# Patient Record
Sex: MALE | Race: WHITE | Employment: UNEMPLOYED | URBAN - METROPOLITAN AREA
[De-identification: names, ages, dates, MRNs, and addresses within clinical notes are randomized per-mention and may not be internally consistent; named-entity substitution may affect disease eponyms.]

---

## 2022-01-28 ENCOUNTER — APPOINTMENT (OUTPATIENT)
Dept: CT IMAGING | Age: 77
DRG: 177 | End: 2022-01-28
Payer: MEDICARE

## 2022-01-28 ENCOUNTER — APPOINTMENT (OUTPATIENT)
Dept: NUCLEAR MEDICINE | Age: 77
DRG: 177 | End: 2022-01-28
Payer: MEDICARE

## 2022-01-28 ENCOUNTER — APPOINTMENT (OUTPATIENT)
Dept: GENERAL RADIOLOGY | Age: 77
DRG: 177 | End: 2022-01-28
Payer: MEDICARE

## 2022-01-28 ENCOUNTER — HOSPITAL ENCOUNTER (INPATIENT)
Age: 77
LOS: 21 days | Discharge: NURSING, REHAB OR LONG-TERM CARE | DRG: 177 | End: 2022-02-18
Attending: EMERGENCY MEDICINE | Admitting: FAMILY MEDICINE
Payer: MEDICARE

## 2022-01-28 DIAGNOSIS — R09.02 HYPOXIA: ICD-10-CM

## 2022-01-28 DIAGNOSIS — R41.82 ALTERED MENTAL STATUS, UNSPECIFIED ALTERED MENTAL STATUS TYPE: ICD-10-CM

## 2022-01-28 DIAGNOSIS — R79.89 ELEVATED SERUM CREATININE: ICD-10-CM

## 2022-01-28 DIAGNOSIS — E87.0 HYPERNATREMIA: ICD-10-CM

## 2022-01-28 DIAGNOSIS — R74.8 ELEVATED LIPASE: ICD-10-CM

## 2022-01-28 DIAGNOSIS — U07.1 COVID: Primary | ICD-10-CM

## 2022-01-28 PROBLEM — N17.9 ACUTE KIDNEY INJURY (HCC): Status: ACTIVE | Noted: 2022-01-28

## 2022-01-28 LAB
ALBUMIN SERPL-MCNC: 3.3 GM/DL (ref 3.4–5)
ALP BLD-CCNC: 72 IU/L (ref 40–129)
ALT SERPL-CCNC: 20 U/L (ref 10–40)
ANION GAP SERPL CALCULATED.3IONS-SCNC: 18 MMOL/L (ref 4–16)
AST SERPL-CCNC: 32 IU/L (ref 15–37)
BASE EXCESS MIXED: 1 (ref 0–1.2)
BASOPHILS ABSOLUTE: 0 K/CU MM
BASOPHILS RELATIVE PERCENT: 0 % (ref 0–1)
BILIRUB SERPL-MCNC: 0.7 MG/DL (ref 0–1)
BUN BLDV-MCNC: 97 MG/DL (ref 6–23)
CALCIUM SERPL-MCNC: 9.2 MG/DL (ref 8.3–10.6)
CHLORIDE BLD-SCNC: 112 MMOL/L (ref 99–110)
CO2: 21 MMOL/L (ref 21–32)
COMMENT: ABNORMAL
CREAT SERPL-MCNC: 3.1 MG/DL (ref 0.9–1.3)
D DIMER: 515 NG/ML(DDU)
DIFFERENTIAL TYPE: ABNORMAL
EKG ATRIAL RATE: 97 BPM
EKG DIAGNOSIS: NORMAL
EKG P AXIS: 20 DEGREES
EKG P-R INTERVAL: 130 MS
EKG Q-T INTERVAL: 372 MS
EKG QRS DURATION: 84 MS
EKG QTC CALCULATION (BAZETT): 472 MS
EKG R AXIS: -10 DEGREES
EKG T AXIS: 23 DEGREES
EKG VENTRICULAR RATE: 97 BPM
EOSINOPHILS ABSOLUTE: 0 K/CU MM
EOSINOPHILS RELATIVE PERCENT: 0 % (ref 0–3)
FERRITIN: 2498 NG/ML (ref 30–400)
GFR AFRICAN AMERICAN: 24 ML/MIN/1.73M2
GFR NON-AFRICAN AMERICAN: 20 ML/MIN/1.73M2
GLUCOSE BLD-MCNC: 199 MG/DL (ref 70–99)
GLUCOSE BLD-MCNC: 213 MG/DL (ref 70–99)
HCO3 VENOUS: 26 MMOL/L (ref 19–25)
HCT VFR BLD CALC: 44.1 % (ref 42–52)
HEMOGLOBIN: 14.2 GM/DL (ref 13.5–18)
HIGH SENSITIVE C-REACTIVE PROTEIN: 104.6 MG/L
IMMATURE NEUTROPHIL %: 0.9 % (ref 0–0.43)
LACTATE: 1.8 MMOL/L (ref 0.4–2)
LIPASE: 72 IU/L (ref 13–60)
LYMPHOCYTES ABSOLUTE: 0.6 K/CU MM
LYMPHOCYTES RELATIVE PERCENT: 11.7 % (ref 24–44)
MCH RBC QN AUTO: 30.1 PG (ref 27–31)
MCHC RBC AUTO-ENTMCNC: 32.2 % (ref 32–36)
MCV RBC AUTO: 93.4 FL (ref 78–100)
MONOCYTES ABSOLUTE: 0.2 K/CU MM
MONOCYTES RELATIVE PERCENT: 4.4 % (ref 0–4)
NUCLEATED RBC %: 0 %
O2 SAT, VEN: 51 % (ref 50–70)
PCO2, VEN: 42 MMHG (ref 38–52)
PDW BLD-RTO: 14.6 % (ref 11.7–14.9)
PH VENOUS: 7.4 (ref 7.32–7.42)
PLATELET # BLD: 157 K/CU MM (ref 140–440)
PMV BLD AUTO: 10.1 FL (ref 7.5–11.1)
PO2, VEN: 29 MMHG (ref 28–48)
POTASSIUM SERPL-SCNC: 3.5 MMOL/L (ref 3.5–5.1)
PRO-BNP: 614.7 PG/ML
PROCALCITONIN: 0.41
RBC # BLD: 4.72 M/CU MM (ref 4.6–6.2)
SARS-COV-2, NAAT: DETECTED
SEGMENTED NEUTROPHILS ABSOLUTE COUNT: 4.5 K/CU MM
SEGMENTED NEUTROPHILS RELATIVE PERCENT: 83 % (ref 36–66)
SODIUM BLD-SCNC: 151 MMOL/L (ref 135–145)
SOURCE: ABNORMAL
TOTAL CK: 157 IU/L (ref 38–174)
TOTAL IMMATURE NEUTOROPHIL: 0.05 K/CU MM
TOTAL NUCLEATED RBC: 0 K/CU MM
TOTAL PROTEIN: 6.9 GM/DL (ref 6.4–8.2)
TROPONIN T: <0.01 NG/ML
WBC # BLD: 5.4 K/CU MM (ref 4–10.5)

## 2022-01-28 PROCEDURE — 78580 LUNG PERFUSION IMAGING: CPT

## 2022-01-28 PROCEDURE — 87635 SARS-COV-2 COVID-19 AMP PRB: CPT

## 2022-01-28 PROCEDURE — 85379 FIBRIN DEGRADATION QUANT: CPT

## 2022-01-28 PROCEDURE — 85025 COMPLETE CBC W/AUTO DIFF WBC: CPT

## 2022-01-28 PROCEDURE — 3430000000 HC RX DIAGNOSTIC RADIOPHARMACEUTICAL: Performed by: FAMILY MEDICINE

## 2022-01-28 PROCEDURE — 71045 X-RAY EXAM CHEST 1 VIEW: CPT

## 2022-01-28 PROCEDURE — 83605 ASSAY OF LACTIC ACID: CPT

## 2022-01-28 PROCEDURE — A9540 TC99M MAA: HCPCS | Performed by: FAMILY MEDICINE

## 2022-01-28 PROCEDURE — 96374 THER/PROPH/DIAG INJ IV PUSH: CPT

## 2022-01-28 PROCEDURE — 82805 BLOOD GASES W/O2 SATURATION: CPT

## 2022-01-28 PROCEDURE — 82550 ASSAY OF CK (CPK): CPT

## 2022-01-28 PROCEDURE — 99283 EMERGENCY DEPT VISIT LOW MDM: CPT

## 2022-01-28 PROCEDURE — 82962 GLUCOSE BLOOD TEST: CPT

## 2022-01-28 PROCEDURE — 6360000002 HC RX W HCPCS: Performed by: PHYSICIAN ASSISTANT

## 2022-01-28 PROCEDURE — 82140 ASSAY OF AMMONIA: CPT

## 2022-01-28 PROCEDURE — 87040 BLOOD CULTURE FOR BACTERIA: CPT

## 2022-01-28 PROCEDURE — 70450 CT HEAD/BRAIN W/O DYE: CPT

## 2022-01-28 PROCEDURE — 96360 HYDRATION IV INFUSION INIT: CPT

## 2022-01-28 PROCEDURE — 86141 C-REACTIVE PROTEIN HS: CPT

## 2022-01-28 PROCEDURE — 93005 ELECTROCARDIOGRAM TRACING: CPT | Performed by: PHYSICIAN ASSISTANT

## 2022-01-28 PROCEDURE — 2580000003 HC RX 258: Performed by: PHYSICIAN ASSISTANT

## 2022-01-28 PROCEDURE — 80053 COMPREHEN METABOLIC PANEL: CPT

## 2022-01-28 PROCEDURE — 84145 PROCALCITONIN (PCT): CPT

## 2022-01-28 PROCEDURE — 93010 ELECTROCARDIOGRAM REPORT: CPT | Performed by: INTERNAL MEDICINE

## 2022-01-28 PROCEDURE — 1200000000 HC SEMI PRIVATE

## 2022-01-28 PROCEDURE — 83690 ASSAY OF LIPASE: CPT

## 2022-01-28 PROCEDURE — 83880 ASSAY OF NATRIURETIC PEPTIDE: CPT

## 2022-01-28 PROCEDURE — 82728 ASSAY OF FERRITIN: CPT

## 2022-01-28 PROCEDURE — 84484 ASSAY OF TROPONIN QUANT: CPT

## 2022-01-28 PROCEDURE — 2580000003 HC RX 258: Performed by: INTERNAL MEDICINE

## 2022-01-28 RX ORDER — CARBAMAZEPINE 100 MG/1
100 TABLET, EXTENDED RELEASE ORAL 2 TIMES DAILY
COMMUNITY
Start: 2022-01-12

## 2022-01-28 RX ORDER — POLYETHYLENE GLYCOL 3350 17 G/17G
17 POWDER, FOR SOLUTION ORAL DAILY PRN
Status: DISCONTINUED | OUTPATIENT
Start: 2022-01-28 | End: 2022-02-19 | Stop reason: HOSPADM

## 2022-01-28 RX ORDER — LEVOFLOXACIN 5 MG/ML
250 INJECTION, SOLUTION INTRAVENOUS
Status: DISCONTINUED | OUTPATIENT
Start: 2022-01-30 | End: 2022-01-28

## 2022-01-28 RX ORDER — ONDANSETRON 2 MG/ML
4 INJECTION INTRAMUSCULAR; INTRAVENOUS EVERY 6 HOURS PRN
Status: DISCONTINUED | OUTPATIENT
Start: 2022-01-28 | End: 2022-02-19 | Stop reason: HOSPADM

## 2022-01-28 RX ORDER — FAMOTIDINE 20 MG/1
10 TABLET, FILM COATED ORAL
Status: DISCONTINUED | OUTPATIENT
Start: 2022-01-29 | End: 2022-02-19 | Stop reason: HOSPADM

## 2022-01-28 RX ORDER — ONDANSETRON 4 MG/1
4 TABLET, ORALLY DISINTEGRATING ORAL EVERY 8 HOURS PRN
Status: DISCONTINUED | OUTPATIENT
Start: 2022-01-28 | End: 2022-02-19 | Stop reason: HOSPADM

## 2022-01-28 RX ORDER — LEVOFLOXACIN 5 MG/ML
500 INJECTION, SOLUTION INTRAVENOUS ONCE
Status: DISCONTINUED | OUTPATIENT
Start: 2022-01-28 | End: 2022-01-28

## 2022-01-28 RX ORDER — ACETAMINOPHEN 650 MG/1
650 SUPPOSITORY RECTAL EVERY 6 HOURS PRN
Status: DISCONTINUED | OUTPATIENT
Start: 2022-01-28 | End: 2022-02-19 | Stop reason: HOSPADM

## 2022-01-28 RX ORDER — SODIUM CHLORIDE 0.9 % (FLUSH) 0.9 %
5-40 SYRINGE (ML) INJECTION EVERY 12 HOURS SCHEDULED
Status: DISCONTINUED | OUTPATIENT
Start: 2022-01-28 | End: 2022-02-19 | Stop reason: HOSPADM

## 2022-01-28 RX ORDER — DEXAMETHASONE SODIUM PHOSPHATE 10 MG/ML
6 INJECTION, SOLUTION INTRAMUSCULAR; INTRAVENOUS ONCE
Status: COMPLETED | OUTPATIENT
Start: 2022-01-28 | End: 2022-01-28

## 2022-01-28 RX ORDER — SODIUM CHLORIDE 450 MG/100ML
INJECTION, SOLUTION INTRAVENOUS ONCE
Status: COMPLETED | OUTPATIENT
Start: 2022-01-28 | End: 2022-01-28

## 2022-01-28 RX ORDER — VITAMIN B COMPLEX
2000 TABLET ORAL DAILY
Status: DISCONTINUED | OUTPATIENT
Start: 2022-01-28 | End: 2022-02-19 | Stop reason: HOSPADM

## 2022-01-28 RX ORDER — ACETAMINOPHEN 325 MG/1
650 TABLET ORAL EVERY 6 HOURS PRN
Status: DISCONTINUED | OUTPATIENT
Start: 2022-01-28 | End: 2022-02-19 | Stop reason: HOSPADM

## 2022-01-28 RX ORDER — DEXAMETHASONE SODIUM PHOSPHATE 10 MG/ML
6 INJECTION, SOLUTION INTRAMUSCULAR; INTRAVENOUS EVERY 24 HOURS
Status: DISCONTINUED | OUTPATIENT
Start: 2022-01-29 | End: 2022-02-01

## 2022-01-28 RX ORDER — SODIUM CHLORIDE 450 MG/100ML
INJECTION, SOLUTION INTRAVENOUS CONTINUOUS
Status: DISCONTINUED | OUTPATIENT
Start: 2022-01-28 | End: 2022-01-30

## 2022-01-28 RX ORDER — HEPARIN SODIUM 5000 [USP'U]/ML
5000 INJECTION, SOLUTION INTRAVENOUS; SUBCUTANEOUS EVERY 8 HOURS SCHEDULED
Status: DISCONTINUED | OUTPATIENT
Start: 2022-01-28 | End: 2022-02-01

## 2022-01-28 RX ORDER — LEVOFLOXACIN 5 MG/ML
750 INJECTION, SOLUTION INTRAVENOUS
Status: DISCONTINUED | OUTPATIENT
Start: 2022-01-28 | End: 2022-02-01

## 2022-01-28 RX ORDER — ASPIRIN 81 MG/1
81 TABLET, CHEWABLE ORAL DAILY
Status: DISCONTINUED | OUTPATIENT
Start: 2022-01-29 | End: 2022-02-19 | Stop reason: HOSPADM

## 2022-01-28 RX ORDER — SODIUM CHLORIDE 0.9 % (FLUSH) 0.9 %
5-40 SYRINGE (ML) INJECTION PRN
Status: DISCONTINUED | OUTPATIENT
Start: 2022-01-28 | End: 2022-02-19 | Stop reason: HOSPADM

## 2022-01-28 RX ADMIN — DEXAMETHASONE SODIUM PHOSPHATE 6 MG: 10 INJECTION, SOLUTION INTRAMUSCULAR; INTRAVENOUS at 16:34

## 2022-01-28 RX ADMIN — SODIUM CHLORIDE: 4.5 INJECTION, SOLUTION INTRAVENOUS at 16:35

## 2022-01-28 RX ADMIN — Medication 3.1 MILLICURIE: at 21:20

## 2022-01-28 RX ADMIN — SODIUM CHLORIDE: 4.5 INJECTION, SOLUTION INTRAVENOUS at 20:00

## 2022-01-28 ASSESSMENT — PAIN SCALES - GENERAL: PAINLEVEL_OUTOF10: 0

## 2022-01-28 NOTE — ED NOTES
Medication History  [facility]    Patient Name: Roxane Velez III 1945     Medication history has been completed by: Xiomy Pruitt CPhT    Source(s) of information: insurance claims     Primary Care Physician: Cynthia Díaz MD     Pharmacy: [facility]    Allergies as of 01/28/2022    (No Known Allergies)        Prior to Admission medications    Medication Sig Start Date End Date Taking? Authorizing Provider   carBAMazepine (TEGRETOL XR) 100 MG extended release tablet Take 100 mg by mouth 2 times daily 1/12/22   Historical Provider, MD   glipiZIDE (GLUCOTROL) 2.5 MG ER tablet Take 2.5 mg by mouth daily    Historical Provider, MD   atorvastatin (LIPITOR) 80 MG tablet Take 80 mg by mouth daily    Historical Provider, MD   lisinopril (PRINIVIL;ZESTRIL) 10 MG tablet Take 10 mg by mouth daily    Historical Provider, MD   gabapentin (NEURONTIN) 400 MG capsule Take 800 mg by mouth nightly    Historical Provider, MD   metFORMIN (GLUCOPHAGE) 500 MG tablet Take 1,000 mg by mouth daily (with breakfast). Historical Provider, MD   metFORMIN (GLUCOPHAGE) 500 MG tablet Take 500 mg by mouth Daily with supper. Historical Provider, MD   oxyCODONE (OXYCONTIN) 20 MG extended release tablet Take 20 mg by mouth every 4 hours as needed. Historical Provider, MD     Medications added or changed (ex. new medication, dosage change, interval change, formulation change):  Carbamazepine ER (added)    Medications removed from list (include reason, ex. noncompliance, medication cost, therapy complete etc.):   Atorvastatin flagged for review, no history found of patient taking this medication    Comments:  Unable to assess patient. Reached out to wife via phone no answer. Medication list per insurance claims.     To my knowledge the above medication history is accurate as of 1/28/2022 3:15 PM.   Xiomy Pruitt CPhT   1/28/2022 3:15 PM

## 2022-01-28 NOTE — ED PROVIDER NOTES
EKG  Normal sinus rhythm with rate of 97 bpm.  Nonspecific ST and T wave changes. No ST elevation. No previous to compare.      Demian Chase MD  01/28/22 1839

## 2022-01-28 NOTE — ED PROVIDER NOTES
EMERGENCY DEPARTMENT ENCOUNTER        PCP: Ciro Orozco MD    CHIEF COMPLAINT    Chief Complaint   Patient presents with    Fatigue     Patient staffed with supervising physician Dr. Díaz Elias is a 64 y.o. male who presents by EMS for confusion. EMS was called by [facility] for well check. Patient and wife had been sick for the past week. Additional history unable to be obtained due to the fact that the patient has decreased level of consciousness. REVIEW OF SYSTEMS    Unable to obtain due to the fact that the patient has decreased level of consciousness  See HPI and nursing notes for additional information. PAST MEDICAL & SURGICAL HISTORY    Past Medical History:   Diagnosis Date    DM (diabetes mellitus) ([facility] [address]     Dysphagia     Dysphonia     Hyperlipidemia     Hypertension     Kidney failure     Osteoarthritis      Past Surgical History:   Procedure Laterality Date    CERVICAL SPINE SURGERY      3 years ago    ENDOSCOPY, COLON, DIAGNOSTIC  2/19/2016    mild inflammation at the squamocolumnar junction- biopsied, no hiatal hernia, diffuse gastric erythema- biopsies to rule out chronic gastritis or intestinal metaplasia       CURRENT MEDICATIONS    Current Outpatient Rx   Medication Sig Dispense Refill    carBAMazepine (TEGRETOL XR) 100 MG extended release tablet Take 100 mg by mouth 2 times daily      glipiZIDE (GLUCOTROL) 2.5 MG ER tablet Take 2.5 mg by mouth daily      atorvastatin (LIPITOR) 80 MG tablet Take 80 mg by mouth daily      lisinopril (PRINIVIL;ZESTRIL) 10 MG tablet Take 10 mg by mouth daily      gabapentin (NEURONTIN) 400 MG capsule Take 800 mg by mouth nightly      metFORMIN (GLUCOPHAGE) 500 MG tablet Take 1,000 mg by mouth daily (with breakfast).  metFORMIN (GLUCOPHAGE) 500 MG tablet Take 500 mg by mouth Daily with supper.  oxyCODONE (OXYCONTIN) 20 MG extended release tablet Take 20 mg by mouth every 4 hours as needed.           ALLERGIES No Known Allergies    SOCIAL & FAMILY HISTORY    Social History     Socioeconomic History    Marital status:      Spouse name: Not on file    Number of children: Not on file    Years of education: Not on file    Highest education level: Not on file   Occupational History    Not on file   Tobacco Use    Smoking status: Former Smoker     Years: 50.00     Quit date: 2010     Years since quittin.0    Smokeless tobacco: Not on file   Substance and Sexual Activity    Alcohol use: No    Drug use: No    Sexual activity: Not on file   Other Topics Concern    Not on file   Social History Narrative    Not on file     Social Determinants of Health     Financial Resource Strain:     Difficulty of Paying Living Expenses: Not on file   Food Insecurity:     Worried About [address] in the Last Year: Not on file    [address] in the Last Year: Not on file   Transportation Needs:     Lack of Transportation (Medical): Not on file    Lack of Transportation (Non-Medical):  Not on file   Physical Activity:     Days of Exercise per Week: Not on file    Minutes of Exercise per Session: Not on file   Stress:     Feeling of Stress : Not on file   Social Connections:     Frequency of Communication with Friends and Family: Not on file    Frequency of Social Gatherings with Friends and Family: Not on file    Attends Temple Services: Not on file    Active Member of [address] [place] [address] or Organizations: Not on file    Attends Club or Organization Meetings: Not on file    Marital Status: Not on file   Intimate Partner Violence:     Fear of Current or Ex-Partner: Not on file    Emotionally Abused: Not on file    Physically Abused: Not on file    Sexually Abused: Not on file   Housing Stability:     Unable to Pay for Housing in the Last Year: Not on file    Number of [place] in the Last Year: Not on file    Unstable Housing in the Last Year: Not on file     Family History   Problem Relation Age of Onset    Cancer Sister 61        colon cancer    Cancer Father 68        stomach cancer spread to liver       PHYSICAL EXAM    VITAL SIGNS: BP (!) 143/89   Pulse 101   Temp 98.1 °F (36.7 °C) (Oral)   Resp 17   Ht 6' (1.829 m)   Wt 200 lb (90.7 kg)   SpO2 95%   BMI 27.12 kg/m²    Constitutional: Ill-appearing elderly female  Eyes: Pupils equally round and reactive to light, sclera nonicteric  HENT:  Atraumatic, dry mucus membranes  Neck/Lymphatics: supple, no JVD, no swollen nodes  Respiratory: Decreased lung sounds bilaterally  Cardiovascular: Normal rate and rhythm  GI:  Soft, nontender, normal bowel sounds  Musculoskeletal:  No edema, no acute deformities  Integument:  Warm and dry skin, no petechiae   Neurologic:    Appears drowsy, confused. Normal gross motor coordination & motor strength bilateral upper and lower extremities  Sensation intact. EKG      EKG Interpretation  Please see ED physician's note for EKG interpretation      RADIOLOGY/PROCEDURES    CT HEAD WO CONTRAST   Final Result   1. No acute intracranial abnormality. 2. Diffuse parenchymal volume loss with mild-to-moderate chronic white matter   microvascular ischemic changes. 3. Chronic lacunar infarct in the right thalamus. XR CHEST PORTABLE   Final Result   Ill-defined left-greater-than-right bibasilar airspace disease most   consistent with pneumonia. ED COURSE & MEDICAL DECISION MAKING      Patient presents as above. Patient unable to provide history. Patient had received 500 cc IV fluids by EMS. Patient is hypoxic at 88% on room air, does not normally wear oxygen and placed on 2 L and improved in the mid 90s. I did discuss patient case with wife who states that he has had cough and diarrhea for the past week and that he has seemed more confused. Wife states they are not vaccinated for Covid. Rapid Covid is positive. Venous blood gas is unremarkable. IV Decadron is ordered.  D-dimer, procalcitonin, CRP and ferritin added as patient will require admission for Covid hypoxia. Lipase slightly elevated 72. . CMP shows sodium 151, BUN of 97, creatinine of 3.1, slight anion gap of 18, normal bicarb. CBC within normal limits. Lactic acid is 1.8. Troponin negative. Chest x-ray shows ill-defined left greater than right bibasilar airspace disease most consistent with pneumonia. CT head shows no acute intracranial abnormality. I suspect patient's pneumonia is viral in nature with normal lactic acid and white count. Procalcitonin is 0.409. Consult to nephrologist Dr. Kishan Ann who recommends half-normal saline at 100 mL an hour for hypernatremia and acute kidney injury. Consult hospitalist who accepts admission. CRITICAL CARE NOTE:  There was a high probability of clinically significant life-threatening deterioration of the patient's condition requiring my urgent intervention due to confusion, hypoxia, Covid, hypernatremia. IV Decadron, oxygen, IV fluids, nephrology consult was performed to address this. Total critical care time is at least  35 minutes. This includes vital sign monitoring, pulse oximetry monitoring, telemetry monitoring, clinical response to the IV medications, reviewing the nursing notes, consultation time, dictation/documentation time, and interpretation of the lab work. This time excludes time spent performing procedures and separately billable procedures and family discussion time. Clinical  IMPRESSION    1. COVID    2. Hypoxia    3. Altered mental status, unspecified altered mental status type    4. Hypernatremia    5. Elevated serum creatinine    6. Elevated lipase          PLAN:   Admission to the hospital      Comment: Please note this report has been produced using speech recognition software and may contain errors related to that system including errors in grammar, punctuation, and spelling, as well as words and phrases that may be inappropriate.  If there are any questions or concerns please feel free to contact the dictating provider for clarification.       Wilver Jorge PA-C  01/28/22 6115

## 2022-01-28 NOTE — ED PROVIDER NOTES
I independently examined and evaluated Eric Finders III. In brief, presents with confusion. Per report patient and his wife have been ill for the last week. Unable to obtain further history as pt is confused and answers to questions are incongruent. Vitals:    01/28/22 1536   BP: (!) 165/91   Pulse: 101   Resp: 16   Temp: 97.8 °F (36.6 °C)   SpO2: 94%     Focused exam revealed elderly appearing male laying in the bed. Opens eyes and interacts with me but does not answer questions appropriately. Heart slightly tachycardic but regular rhythm. Lungs with coarse breath sounds bilaterally. Patient is requiring supplemental oxygen to maintain oxygen saturations in the 90s. Patient continues to be confused. When I tell him that he is positive for Covid he tells me that is not possible. EKG:  Nothing acute. Please see Dr. Miroslava Vallecillo note for detailed interpretation. ED course/MDM:  I personally saw the patient and performed a substantive portion of the visit including all aspects of the medical decision making. Patient noted be hypoxic upon arrival to the emergency room, states given supplemental oxygen. Rapid Covid test is positive. He was also given a small fluid bolus by EMS. Labs show hypernatremia as well as elevated creatinine concerning for an MARCO. Physician assistant spoke with nephrology who recommended starting half-normal saline at 100 mL an hour for the above abnormalities. Patient was given IV Decadron for hypoxia in the setting of COVID-19. Will admit patient to the hospital for further evaluation and treatment. I suspect his altered mental status is secondary to his known COVID-19. I did don appropriate PPE (including face mask, protective eye ware/safety glasses, gloves), as recommended by the health facility/national standard best practice, during my bedside interactions with the patient.       All diagnostic, treatment, and disposition decisions were made by myself in conjunction with the advanced practice provider. For all further details of the patient's emergency department visit, please see the advanced practice provider's documentation. Comment: Please note this report has been produced using speech recognition software and may contain errors related to that system including errors in grammar, punctuation, and spelling, as well as words and phrases that may be inappropriate. If there are any questions or concerns please feel free to contact the dictating provider for clarification.         Isaiah Simmons MD  02/03/22 4599

## 2022-01-28 NOTE — H&P
History and Physical      Name:  Bebo Goel III /Age/Sex: 1945  (64 y.o. male)   MRN & CSN:  [id number] & [id number] Admission Date/Time: 2022  1:32 PM   Location:  [place] [address] PCP: Vilma Cleveland MD       Hospital Day: 1    Assessment and Plan:     Acute kidney injury (Cr 3.1, BUN 97)  COVID 19 PNA   Hypernatremia Na 151  D dimer elevated in setting of MARCO  T2 DM  DVT prophylaxis      Bebo Goel III is a 64 y.o.  male  who presents with Acute kidney injury ([facility] [address]    CXR  Consistent with PNA. Edema can not be ruled out. Patient is Tachycardic, afebrile without leukocytosis on room air. COVID +, procal .409, .6, Ferritin 2498, D dimer 515, Lactate 1.8, .7    Patient is not a candidate for Baricitinib or Decadron on room air. Given elevated d dimer, tachycardia and hypercoagulable state in setting of COVID rule out VTE  Monitor respiratory status, Volume status & urinary output. Start empiric antibiotics while inpatient. Procal .409 likely pulmonary vs urinary source. Levaquin for adequate coverage. Nephrology consulted, recommending 1/2NS at 100ml/hr  [address] lisinopril, metformin in setting of MARCO  On tegretal for neuropathy. Resume home medications when appropriate. UA pending. VQ & B/L LE Doppler pending to rule out VTE. Diet No diet orders on file   DVT Prophylaxis [] Lovenox, [x]  Heparin, [] SCDs, [] Ambulation   GI Prophylaxis [] PPI,  [x] H2 Blocker,  [] Carafate,  [] Diet/Tube Feeds   Code Status Full Code   Disposition Patient requires continued admission due to Acute renal failure   MDM [] Low, [] Moderate,[x]  High  Patient's risk as above due to Acute renal failure     History of Present Illness:     Chief Complaint: Acute kidney injury ([facility] [address]  Gerry Arizmendi is a 64 y.o.  male  Unvaccinated with hx of T2DM, HTN, Dyslipidemia who presents by EMS after [facility] called for well check.   Patient was confused and generally weak when they arrived. Patient reports both him and his wife have had progressive symptoms for 2-3 weeks. Mainly feeling weak with poor appetite, cough & diarrhea. Patient is resting comfortably in bed in no acute distress at this time on room air. ED Course: COVID +, MARCO, Na 151. Nephrology recommending 1/2NS 100ml/hr. Ten point ROS reviewed negative, unless as noted above    Objective:   No intake or output data in the 24 hours ending 01/28/22 1940   Vitals:   Vitals:    01/28/22 1608   BP: (!) 143/89   Pulse: 101   Resp: 17   Temp: 98.1 °F (36.7 °C)   SpO2: 95%     Physical Exam:   GEN Awake male, sitting upright in bed in no apparent distress. Appears given age. EYES Pupils are equally round. No scleral erythema, discharge, or conjunctivitis. HENT Mucous membranes are moist. Oral pharynx without exudates, no evidence of thrush. NECK Supple, no apparent thyromegaly or masses. RESP  Bibasilar cracknels. no wheezes or rhonchi. Symmetric chest movement while on room air. CARDIO/VASC S1/S2 auscultated. Regular rate without appreciable murmurs, rubs, or gallops. No JVD or carotid bruits. Peripheral pulses equal bilaterally and palpable. No peripheral edema. GI Abdomen is soft without significant tenderness, masses, or guarding. Bowel sounds are normoactive. Rectal exam deferred.  No costovertebral angle tenderness. Normal appearing external genitalia. Newton catheter is not present. HEME/LYMPH No palpable cervical lymphadenopathy and no hepatosplenomegaly. No petechiae or ecchymoses. MSK No gross joint deformities. SKIN Normal coloration, warm, dry. NEURO Cranial nerves appear grossly intact, normal speech, no lateralizing weakness. PSYCH Awake, alert, oriented x 2. Affect appropriate.     Past Medical History:      Past Medical History:   Diagnosis Date    DM (diabetes mellitus) ([address]     Dysphagia     Dysphonia     Hyperlipidemia     Hypertension     Kidney failure     Osteoarthritis      PSHX:  has a past surgical history that includes Cervical spine surgery and Endoscopy, colon, diagnostic (2016). Allergies: No Known Allergies    FAM HX: family history includes Cancer (age of onset: 61) in his sister; Cancer (age of onset: 68) in his father. Soc HX:   Social History     Socioeconomic History    Marital status:      Spouse name: Not on file    Number of children: Not on file    Years of education: Not on file    Highest education level: Not on file   Occupational History    Not on file   Tobacco Use    Smoking status: Former Smoker     Years: 50.00     Quit date: 2010     Years since quittin.0    Smokeless tobacco: Not on file   Substance and Sexual Activity    Alcohol use: No    Drug use: No    Sexual activity: Not on file   Other Topics Concern    Not on file   Social History Narrative    Not on file     Social Determinants of Health     Financial Resource Strain:     Difficulty of Paying Living Expenses: Not on file   Food Insecurity:     Worried About [address] in the Last Year: Not on file    [address] in the Last Year: Not on file   Transportation Needs:     Lack of Transportation (Medical): Not on file    Lack of Transportation (Non-Medical):  Not on file   Physical Activity:     Days of Exercise per Week: Not on file    Minutes of Exercise per Session: Not on file   Stress:     Feeling of Stress : Not on file   Social Connections:     Frequency of Communication with Friends and Family: Not on file    Frequency of Social Gatherings with Friends and Family: Not on file    Attends Faith Services: Not on file    Active Member of Clubs or Organizations: Not on file    Attends Club or Organization Meetings: Not on file    Marital Status: Not on file   Intimate Partner Violence:     Fear of Current or Ex-Partner: Not on file    Emotionally Abused: Not on file    Physically Abused: Not on file   [place] Sexually Abused: Not on file   Housing Stability:     Unable to Pay for Housing in the Last Year: Not on file    Number of [place] in the Last Year: Not on file    Unstable Housing in the Last Year: Not on file       Medications:   Medications:    Infusions:   PRN Meds:       Electronically signed by Jerrod Aguayo DO on 1/28/2022 at 7:40 PM

## 2022-01-28 NOTE — ED TRIAGE NOTES
Pt arrived by EMS with complaints of generalized weakness. Per EMS, 's office was sent out for a wellness check. Pt was complaining of weakness all over and squad was called to bring pt in for evaluation.

## 2022-01-29 ENCOUNTER — APPOINTMENT (OUTPATIENT)
Dept: GENERAL RADIOLOGY | Age: 77
DRG: 177 | End: 2022-01-29
Payer: MEDICARE

## 2022-01-29 ENCOUNTER — APPOINTMENT (OUTPATIENT)
Dept: ULTRASOUND IMAGING | Age: 77
DRG: 177 | End: 2022-01-29
Payer: MEDICARE

## 2022-01-29 LAB
ALBUMIN SERPL-MCNC: 3.2 GM/DL (ref 3.4–5)
ANION GAP SERPL CALCULATED.3IONS-SCNC: 18 MMOL/L (ref 4–16)
BASOPHILS ABSOLUTE: 0 K/CU MM
BASOPHILS RELATIVE PERCENT: 0 % (ref 0–1)
BUN BLDV-MCNC: 96 MG/DL (ref 6–23)
CALCIUM SERPL-MCNC: 8.7 MG/DL (ref 8.3–10.6)
CHLORIDE BLD-SCNC: 114 MMOL/L (ref 99–110)
CO2: 20 MMOL/L (ref 21–32)
CREAT SERPL-MCNC: 2.5 MG/DL (ref 0.9–1.3)
D DIMER: 465 NG/ML(DDU)
DIFFERENTIAL TYPE: ABNORMAL
EOSINOPHILS ABSOLUTE: 0 K/CU MM
EOSINOPHILS RELATIVE PERCENT: 0 % (ref 0–3)
ESTIMATED AVERAGE GLUCOSE: 126 MG/DL
GFR AFRICAN AMERICAN: 30 ML/MIN/1.73M2
GFR NON-AFRICAN AMERICAN: 25 ML/MIN/1.73M2
GLUCOSE BLD-MCNC: 160 MG/DL (ref 70–99)
GLUCOSE BLD-MCNC: 185 MG/DL (ref 70–99)
GLUCOSE BLD-MCNC: 189 MG/DL (ref 70–99)
GLUCOSE BLD-MCNC: 190 MG/DL (ref 70–99)
GLUCOSE BLD-MCNC: 200 MG/DL (ref 70–99)
HBA1C MFR BLD: 6 % (ref 4.2–6.3)
HCT VFR BLD CALC: 39.5 % (ref 42–52)
HEMOGLOBIN: 13 GM/DL (ref 13.5–18)
HIGH SENSITIVE C-REACTIVE PROTEIN: 81.2 MG/L
IMMATURE NEUTROPHIL %: 1 % (ref 0–0.43)
LACTATE DEHYDROGENASE: 512 IU/L (ref 120–246)
LIPASE: 55 IU/L (ref 13–60)
LYMPHOCYTES ABSOLUTE: 0.5 K/CU MM
LYMPHOCYTES RELATIVE PERCENT: 10.3 % (ref 24–44)
MCH RBC QN AUTO: 31 PG (ref 27–31)
MCHC RBC AUTO-ENTMCNC: 32.9 % (ref 32–36)
MCV RBC AUTO: 94 FL (ref 78–100)
MONOCYTES ABSOLUTE: 0.2 K/CU MM
MONOCYTES RELATIVE PERCENT: 4.7 % (ref 0–4)
NUCLEATED RBC %: 0 %
PDW BLD-RTO: 14.3 % (ref 11.7–14.9)
PHOSPHORUS: 2.6 MG/DL (ref 2.5–4.9)
PLATELET # BLD: 156 K/CU MM (ref 140–440)
PMV BLD AUTO: 10.3 FL (ref 7.5–11.1)
POTASSIUM SERPL-SCNC: 3.6 MMOL/L (ref 3.5–5.1)
PROCALCITONIN: 0.32
PROCALCITONIN: 0.32
RBC # BLD: 4.2 M/CU MM (ref 4.6–6.2)
SEGMENTED NEUTROPHILS ABSOLUTE COUNT: 4.3 K/CU MM
SEGMENTED NEUTROPHILS RELATIVE PERCENT: 84 % (ref 36–66)
SODIUM BLD-SCNC: 152 MMOL/L (ref 135–145)
TOTAL IMMATURE NEUTOROPHIL: 0.05 K/CU MM
TOTAL NUCLEATED RBC: 0 K/CU MM
VITAMIN D 25-HYDROXY: 13.95 NG/ML
WBC # BLD: 5.2 K/CU MM (ref 4–10.5)

## 2022-01-29 PROCEDURE — 6360000002 HC RX W HCPCS: Performed by: FAMILY MEDICINE

## 2022-01-29 PROCEDURE — 2700000000 HC OXYGEN THERAPY PER DAY

## 2022-01-29 PROCEDURE — 6370000000 HC RX 637 (ALT 250 FOR IP): Performed by: INTERNAL MEDICINE

## 2022-01-29 PROCEDURE — 86141 C-REACTIVE PROTEIN HS: CPT

## 2022-01-29 PROCEDURE — 6360000002 HC RX W HCPCS

## 2022-01-29 PROCEDURE — 83036 HEMOGLOBIN GLYCOSYLATED A1C: CPT

## 2022-01-29 PROCEDURE — 85379 FIBRIN DEGRADATION QUANT: CPT

## 2022-01-29 PROCEDURE — 85025 COMPLETE CBC W/AUTO DIFF WBC: CPT

## 2022-01-29 PROCEDURE — 80053 COMPREHEN METABOLIC PANEL: CPT

## 2022-01-29 PROCEDURE — 2580000003 HC RX 258: Performed by: FAMILY MEDICINE

## 2022-01-29 PROCEDURE — 2580000003 HC RX 258: Performed by: INTERNAL MEDICINE

## 2022-01-29 PROCEDURE — 71045 X-RAY EXAM CHEST 1 VIEW: CPT

## 2022-01-29 PROCEDURE — 82962 GLUCOSE BLOOD TEST: CPT

## 2022-01-29 PROCEDURE — 6360000002 HC RX W HCPCS: Performed by: INTERNAL MEDICINE

## 2022-01-29 PROCEDURE — 84145 PROCALCITONIN (PCT): CPT

## 2022-01-29 PROCEDURE — 82306 VITAMIN D 25 HYDROXY: CPT

## 2022-01-29 PROCEDURE — 83615 LACTATE (LD) (LDH) ENZYME: CPT

## 2022-01-29 PROCEDURE — 1200000000 HC SEMI PRIVATE

## 2022-01-29 PROCEDURE — 83690 ASSAY OF LIPASE: CPT

## 2022-01-29 PROCEDURE — 36415 COLL VENOUS BLD VENIPUNCTURE: CPT

## 2022-01-29 PROCEDURE — 93970 EXTREMITY STUDY: CPT

## 2022-01-29 PROCEDURE — 6370000000 HC RX 637 (ALT 250 FOR IP): Performed by: FAMILY MEDICINE

## 2022-01-29 PROCEDURE — 94761 N-INVAS EAR/PLS OXIMETRY MLT: CPT

## 2022-01-29 PROCEDURE — 80069 RENAL FUNCTION PANEL: CPT

## 2022-01-29 RX ORDER — INSULIN GLARGINE 100 [IU]/ML
10 INJECTION, SOLUTION SUBCUTANEOUS NIGHTLY
Status: DISCONTINUED | OUTPATIENT
Start: 2022-01-29 | End: 2022-01-31

## 2022-01-29 RX ORDER — INSULIN GLARGINE 100 [IU]/ML
15 INJECTION, SOLUTION SUBCUTANEOUS NIGHTLY
Status: DISCONTINUED | OUTPATIENT
Start: 2022-01-29 | End: 2022-01-29

## 2022-01-29 RX ADMIN — SODIUM CHLORIDE: 4.5 INJECTION, SOLUTION INTRAVENOUS at 20:39

## 2022-01-29 RX ADMIN — ASPIRIN 81 MG CHEWABLE TABLET 81 MG: 81 TABLET CHEWABLE at 12:30

## 2022-01-29 RX ADMIN — DEXAMETHASONE SODIUM PHOSPHATE 6 MG: 10 INJECTION INTRAMUSCULAR; INTRAVENOUS at 13:59

## 2022-01-29 RX ADMIN — HEPARIN SODIUM 5000 UNITS: 5000 INJECTION, SOLUTION INTRAVENOUS; SUBCUTANEOUS at 21:05

## 2022-01-29 RX ADMIN — BARICITINIB 1 MG: 2 TABLET, FILM COATED ORAL at 13:59

## 2022-01-29 RX ADMIN — SODIUM CHLORIDE, PRESERVATIVE FREE 10 ML: 5 INJECTION INTRAVENOUS at 20:43

## 2022-01-29 RX ADMIN — Medication 2000 UNITS: at 12:30

## 2022-01-29 RX ADMIN — LEVOFLOXACIN 750 MG: 5 INJECTION, SOLUTION INTRAVENOUS at 01:50

## 2022-01-29 RX ADMIN — INSULIN GLARGINE 10 UNITS: 100 INJECTION, SOLUTION SUBCUTANEOUS at 20:59

## 2022-01-29 RX ADMIN — HEPARIN SODIUM 5000 UNITS: 5000 INJECTION, SOLUTION INTRAVENOUS; SUBCUTANEOUS at 06:25

## 2022-01-29 RX ADMIN — SODIUM CHLORIDE, PRESERVATIVE FREE 10 ML: 5 INJECTION INTRAVENOUS at 12:39

## 2022-01-29 RX ADMIN — FAMOTIDINE 10 MG: 20 TABLET, FILM COATED ORAL at 12:30

## 2022-01-29 RX ADMIN — HEPARIN SODIUM 5000 UNITS: 5000 INJECTION, SOLUTION INTRAVENOUS; SUBCUTANEOUS at 13:58

## 2022-01-29 RX ADMIN — HEPARIN SODIUM 5000 UNITS: 5000 INJECTION, SOLUTION INTRAVENOUS; SUBCUTANEOUS at 01:48

## 2022-01-29 RX ADMIN — CEFTRIAXONE SODIUM 1000 MG: 1 INJECTION, POWDER, FOR SOLUTION INTRAMUSCULAR; INTRAVENOUS at 12:36

## 2022-01-29 ASSESSMENT — PAIN SCALES - GENERAL
PAINLEVEL_OUTOF10: 0

## 2022-01-29 NOTE — CONSULTS
Subjective:   CHIEF COMPLAINT / HPI:  64year old male admitted with increasing sob. He has off and on wheeze associated with sob. He has mild cough. No fever or chest pain or hemoptysis. he tested positive for covid      Past Medical History:  Past Medical History:   Diagnosis Date    DM [facility]diabetes mellitus) ([facility] [address]     Dysphagia     Dysphonia     Hyperlipidemia     Hypertension     Kidney failure     Osteoarthritis        Past Surgical History:        Procedure Laterality Date    CERVICAL SPINE SURGERY      3 years ago    ENDOSCOPY, COLON, DIAGNOSTIC  2/19/2016    mild inflammation at the squamocolumnar junction- biopsied, no hiatal hernia, diffuse gastric erythema- biopsies to rule out chronic gastritis or intestinal metaplasia       Current Medications:    Current Facility-Administered Medications: cefTRIAXone (ROCEPHIN) 1000 mg IVPB in 50 mL D5W minibag, 1,000 mg, IntraVENous, Daily  baricitinib (OLUMIANT) tablet 1 mg, 1 mg, Oral, Daily  sodium chloride flush 0.9 % injection 5-40 mL, 5-40 mL, IntraVENous, 2 times per day  sodium chloride flush 0.9 % injection 5-40 mL, 5-40 mL, IntraVENous, PRN  ondansetron (ZOFRAN-ODT) disintegrating tablet 4 mg, 4 mg, Oral, Q8H PRN **OR** ondansetron (ZOFRAN) injection 4 mg, 4 mg, IntraVENous, Q6H PRN  polyethylene glycol (GLYCOLAX) packet 17 g, 17 g, Oral, Daily PRN  acetaminophen (TYLENOL) tablet 650 mg, 650 mg, Oral, Q6H PRN **OR** acetaminophen (TYLENOL) suppository 650 mg, 650 mg, Rectal, Q6H PRN  dexamethasone (PF) (DECADRON) injection 6 mg, 6 mg, IntraVENous, Q24H  Vitamin D (CHOLECALCIFEROL) tablet 2,000 Units, 2,000 Units, Oral, Daily  0.45 % sodium chloride infusion, , IntraVENous, Continuous  heparin (porcine) injection 5,000 Units, 5,000 Units, SubCUTAneous, 3 times per day  insulin lispro (HUMALOG) injection vial 0-6 Units, 0-6 Units, SubCUTAneous, TID WC  insulin lispro (HUMALOG) injection vial 0-3 Units, 0-3 Units, SubCUTAneous, Nightly  aspirin chewable tablet 81 mg, 81 mg, Oral, Daily  famotidine (PEPCID) tablet 10 mg, 10 mg, Oral, Q48H  levoFLOXacin (LEVAQUIN) 750 MG/150ML infusion 750 mg, 750 mg, IntraVENous, Q48H    No Known Allergies    Social History:    Social History     Socioeconomic History    Marital status:      Spouse name: Not on file    Number of children: Not on file    Years of education: Not on file    Highest education level: Not on file   Occupational History    Not on file   Tobacco Use    Smoking status: Former Smoker     Years: 50.00     Quit date: 2010     Years since quittin.0    Smokeless tobacco: Not on file   Substance and Sexual Activity    Alcohol use: No    Drug use: No    Sexual activity: Not on file   Other Topics Concern    Not on file   Social History Narrative    Not on file     Social Determinants of Health     Financial Resource Strain:     Difficulty of Paying Living Expenses: Not on file   Food Insecurity:     Worried About [address] in the Last Year: Not on file    [address] in the Last Year: Not on file   Transportation Needs:     Lack of Transportation (Medical): Not on file    Lack of Transportation (Non-Medical):  Not on file   Physical Activity:     Days of Exercise per Week: Not on file    Minutes of Exercise per Session: Not on file   Stress:     Feeling of Stress : Not on file   Social Connections:     Frequency of Communication with Friends and Family: Not on file    Frequency of Social Gatherings with Friends and Family: Not on file    Attends Yarsanism Services: Not on file    Active Member of Clubs or Organizations: Not on file    Attends Club or Organization Meetings: Not on file    Marital Status: Not on file   Intimate Partner Violence:     Fear of Current or Ex-Partner: Not on file    Emotionally Abused: Not on file    Physically Abused: Not on file    Sexually Abused: Not on file   Housing Stability:     Unable to Pay for Housing in the Last Year: Not on file    Number of Places Lived in the Last Year: Not on file    Unstable Housing in the Last Year: Not on file       Family History:   Family History   Problem Relation Age of Onset    Cancer Sister 61        colon cancer    Cancer Father 68        stomach cancer spread to liver       Immunization:    There is no immunization history on file for this patient. REVIEW OF SYSTEMS:    CONSTITUTIONAL:  negative for fevers, chills, diaphoresis, activity change, appetite change, fatigue, night sweats and unexpected weight change. EYES:  negative for blurred vision, eye discharge, visual disturbance and icterus  HEENT:  negative for hearing loss, tinnitus, ear drainage, sinus pressure, nasal congestion, epistaxis and snoring  RESPIRATORY:  See HPI  CARDIOVASCULAR:  negative for chest pain, palpitations, exertional chest pressure/discomfort, edema, syncope  GASTROINTESTINAL:  negative for nausea, vomiting, diarrhea, constipation, blood in stool and abdominal pain  GENITOURINARY:  negative for frequency, dysuria and hematuria  HEMATOLOGIC/LYMPHATIC:  negative for easy bruising, bleeding and lymphadenopathy  ALLERGIC/IMMUNOLOGIC:  negative for recurrent infections, angioedema, anaphylaxis and drug reactions  ENDOCRINE:  negative for weight changes and diabetic symptoms including polyuria, polydipsia and polyphagia    MUSCULOSKELETAL:  negative for  pain, joint swelling, decreased range of motion and muscle weakness  NEUROLOGICAL:  negative for headaches, slurred speech, unilateral weakness  PSYCHIATRIC/BEHAVIORAL: negative for hallucinations, behavioral problems, confusion and agitation.      Objective:   PHYSICAL EXAM:      VITALS:    Vitals:    01/28/22 2001 01/28/22 2325 01/29/22 0615 01/29/22 1258   BP: (!) 146/91 133/80 113/73    Pulse: 88 96     Resp:  18 18    Temp:  98 °F (36.7 °C)     TempSrc:  Oral Oral    SpO2: 95% 91%  94%   Weight:       Height:             CONSTITUTIONAL:  awake, alert, cooperative, no apparent distress, and appears stated age  NECK:  Supple, symmetrical, trachea midline, no adenopathy, thyroid symmetric, not enlarged and no tenderness  CHEST: Chest expansion equal and symmetrical, no intercostal retraction. LUNGS:  no increased work of breathing, has expiratory wheezes both lungs, no crackles. CARDIOVASCULAR: S1 and S2, no edema and no JVD  ABDOMEN:  normal bowel sounds, non-distended and no masses palpated, and no tenderness to palpation. No hepatospleenomegaly  LYMPHADENOPATHY:  no axillary or supraclavicular adenopathy. No cervical adnenopathy  PSYCHIATRIC: Oriented to person place and time. No obvious depression or anxiety. MUSCULOSKELETAL: No obvious misalignment or effusion of the joints. No clubbing, cyanosis of the digits. RIGHT AND LEFT LOWER EXTREMITIES: No edema, no inflammation, no tenderness. SKIN:  normal skin color, texture, turgor and no redness, warmth, or swelling. No palpable nodules    DATA:       EXAMINATION:   ONE XRAY VIEW OF THE CHEST       1/29/2022 6:54 am       COMPARISON:   1/28/2022       HISTORY:   ORDERING SYSTEM PROVIDED HISTORY: SOB   TECHNOLOGIST PROVIDED HISTORY:   Reason for exam:->SOB   Reason for Exam: SOB       FINDINGS:   Fusion plate in the lower cervical spine is partially visualized and   unremarkable.       The cardiac silhouette is normal.  There is moderate thoracic aortic   calcification.  There are ground-glass opacities in the lung bases, greater   on the left.  There is no significant interval change.  No pneumothorax is   identified.           Impression   Mild bibasilar airspace disease, greater on the left, most likely pneumonia.                               Assessment:     1. Ac hypoxemic resp failure  2. covid pneumonia  3. Bact pneumonia  4. Ac bronchospasm          Plan:     1. Adequate o2 adm  2. Vap/bipap if needed  3. Bd rx as needed  4. Cont decadron  5. On baricitinab  6. On levaquin  7.  Thanks will follow

## 2022-01-29 NOTE — PROGRESS NOTES
Pharmacy Consult for Baricitinib Initiation per Dr. Prosper Lopez per [facility] THE [facility] P&T Committee  **All criteria need to be met to receive   Baricitinib for COVID-19 patients**   Age    >5years old     Yes   Laboratory Results    Confirmed positive COVID-19     PLUS    ANY elevation in biomarkers   (CRP, D-dimer, LDH, ferritin)       Yes    CRP: 104.6 (1/28/22)  CRP: 81.2 (1/29/22)   Concomitant Therapy    Receiving systemic steroids for treatment of COVID 19     Yes   Oxygen Status        Requiring supplemental oxygen   (>1 L NC, HFNC, Heated Vapotherm, biPAP, mechanical ventilation)        Yes  2L/min NC   Special Considerations    Pregnancy  Severe Hepatic Impairment  Chronic/Recurrent Infections   Hemoglobin <8         None noted     Contraindications    1. Invasive active mycobacterial or fungal infection  2. Lymphocyte count <200  3. Neutrophil count, ANC <500   4. Significant immunosuppression    -    None     LIVER FUNCTION LAB EVALUATION  Recent Labs     01/28/22  1426   AST 32   ALT 20   ALKPHOS 72   BILITOT 0.7     HEMOGLOBIN AND HEMATOCRIT  Recent Labs     01/28/22  1426 01/28/22  1426 01/29/22  0539   HGB 14.2   < > 13.0*   HCT 44.1  --  39.5*    < > = values in this interval not displayed. [address]  Recent Labs     01/28/22  1426 01/29/22  0539   SEGSABS 4.5 4.3     Lymphocytes: 500    RENAL LAB EVALUATION  Estimated Creatinine Clearance: 27 mL/min (A) (based on SCr of 2.5 mg/dL (H)).   Recent Labs     01/28/22  1426 01/29/22  0539   BUN 97* 96*   CREATININE 3.1* 2.5*   eGFR = 25        Dosing Recommendations:    Baricitinib 1 mg PO daily x 14 days (or until hospital discharge)  Renal dose required for eGFR = 25    Thank you for the consult,  Willie Salinas, [address] [facility], PharmD

## 2022-01-29 NOTE — CONSULTS
Nephrology Service Consultation    Patient:  Lakesha Flower III  MRN: [id number]  Consulting physician:  Divine Rodriguez MD  Reason for Consult: Acute renal failure    History Obtained From:  patient, electronic medical record  PCP: Darcy Thompson MD    HISTORY OF PRESENT ILLNESS:   The patient is a 64 y.o. male who presents with presents after called in for well check found to be weak and fatigued not doing well at home. Patient apparently has been weak as well as his wife for the last 2 weeks not feeling well with weakness and diarrhea. States has not been vaccinated for Covid has history of type 2 diabetes hypertension above setting. Presents and found to have COVID-19 positive again and acute renal failure with hypernatremia. Admitted for evaluation work-up and renal asked evaluate above setting. Work-up including VQ scan and lower extremity Doppler shows no evidence of PE or blood clot. And CT of the head was negative for any acute pathology. Recent creatinine prior to this admission was about 1.6 it appears.   Was on lisinopril as well as Metformin prior to this admission which could play a factor for dehydration    Past Medical History:        Diagnosis Date    DM (diabetes mellitus) ([facility] [address]     Dysphagia     Dysphonia     Hyperlipidemia     Hypertension     Kidney failure     Osteoarthritis        Past Surgical History:        Procedure Laterality Date    CERVICAL SPINE SURGERY      3 years ago    ENDOSCOPY, COLON, DIAGNOSTIC  2/19/2016    mild inflammation at the squamocolumnar junction- biopsied, no hiatal hernia, diffuse gastric erythema- biopsies to rule out chronic gastritis or intestinal metaplasia       Medications:   Scheduled Meds:   sodium chloride flush  5-40 mL IntraVENous 2 times per day    dexamethasone  6 mg IntraVENous Q24H    Vitamin D  2,000 Units Oral Daily    heparin (porcine)  5,000 Units SubCUTAneous 3 times per day    insulin lispro  0-6 Units hours.    Invalid input(s): LDLCALCU  ABGs: No results found for: PHART, PO2ART, OXO1BGC  INR: No results for input(s): INR in the last 72 hours.   Renal Labs  Albumin:    Lab Results   Component Value Date    LABALBU 3.2 01/29/2022    LABALBU 100 05/30/2012     Calcium:    Lab Results   Component Value Date    CALCIUM 8.7 01/29/2022     Phosphorus:    Lab Results   Component Value Date    PHOS 2.6 01/29/2022     U/A:    Lab Results   Component Value Date    NITRU NEGATIVE 03/14/2017    COLORU YELLOW 03/14/2017    WBCUA 1 03/14/2017    RBCUA 1 03/14/2017    MUCUS RARE 03/14/2017    BACTERIA NEGATIVE 03/14/2017    CLARITYU CLEAR 03/14/2017    SPECGRAV 1.014 03/14/2017    UROBILINOGEN NORMAL 03/14/2017    BILIRUBINUR NEGATIVE 03/14/2017    BLOODU SMALL 03/14/2017    KETUA NEGATIVE 03/14/2017     ABG:  No results found for: PHART, FDN1VEN, PO2ART, ZTT7FSN, BEART, THGBART, OFM4QBN, Z1NLWBHS  HgBA1c:  No results found for: LABA1C  Microalbumen/Creatinine ratio:  No components found for: RUCREAT  TSH:  No results found for: TSH  IRON:  No results found for: IRON  Iron Saturation:  No components found for: PERCENTFE  TIBC:  No results found for: TIBC  FERRITIN:    Lab Results   Component Value Date    FERRITIN 2,498 01/28/2022     RPR:  No results found for: RPR  JEREMIE:  No results found for: ANATITER, JEREMIE  24 Hour Urine for Creatinine Clearance:  No components found for: CREAT4, UHRS10, UTV10  -----------------------------------------------------------------      Assessment and Recommendations     Patient Active Problem List   Diagnosis Code    [facility] N17.9     Impression plan  #1 COVID-19 pneumonia  #2 hypernatremia  #3 acute renal failure from ATN on CKD 3 creatinine 1.6  #4 type 2 diabetes  #5 hypertension  #6 weakness with some mild confusion    Plan  #1 treat above setting for COVID-19 replete vitamin D as well  #2 with elevated sodium in the setting of dehydration monitor is hydrate patient  #3 creatinine is already improved with hydration maintain gentle IV fluids and monitor closely work-up for other etiologies  #4 monitor glucose with sliding scale insulin  #5 blood pressure low stable monitor hold blood pressure meds  #6 hold Metformin and lisinopril at this time  #7 monitor affect and above setting  Electronically signed by Ar Hearn MD on 1/29/2022 at 8:22 AM

## 2022-01-29 NOTE — CONSULTS
Endocrinology   Consult Note      Dear Doctor Mariann Bonilla     Thank You for the Consult     Pt. Was Admitted for altered mental state with generalized weakness positive for Covid pneumonia:    Reason for Consult: Care of blood glucose      History Obtained From:  Patient/ EMR       HISTORY OF PRESENT ILLNESS:                The patient is a 64 y.o. male with significant past medical history of diabetes mellitus, renal insufficiency hypertension, hyperlipidemia, osteoarthritis significantly to swallow was brought in by EMS complaining of altered mental state generalized weakness and some diarrhea. Patient was positive for Covid infection and x-ray is consistent with possible pneumonia I was  consulted for better control of blood glucose. ROS:   Pt's ROS done in detail. Abnormal ROS are noted in Medical and Surgical History Section below: Other Medical History:        Diagnosis Date    DM (diabetes mellitus) ([address]     Dysphagia     Dysphonia     Hyperlipidemia     Hypertension     Kidney failure     Osteoarthritis      Surgical History:        Procedure Laterality Date    CERVICAL SPINE SURGERY      3 years ago    ENDOSCOPY, COLON, DIAGNOSTIC  2/19/2016    mild inflammation at the squamocolumnar junction- biopsied, no hiatal hernia, diffuse gastric erythema- biopsies to rule out chronic gastritis or intestinal metaplasia       Allergies:  Patient has no known allergies.     Family History:       Problem Relation Age of Onset    Cancer Sister 61        colon cancer    Cancer Father 68        stomach cancer spread to liver     REVIEW OF SYSTEMS:  Review of System Done as noted above     PHYSICAL EXAM:      Vitals:    /73   Pulse 96   Temp 98 °F (36.7 °C) (Oral)   Resp 18   Ht 6' (1.829 m)   Wt 200 lb (90.7 kg)   SpO2 94%   BMI 27.12 kg/m²     CONSTITUTIONAL:  awake, alert, cooperative, appears stated age   EYES:  vision intact Fundoscopic Exam not performed   ENT:Normal  NECK:  Supple, No JVD.   Thyroid Exam:Normal   LUNGS:  Has Vesicular Breath Sounds, rales and wheezing at bases  CARDIOVASCULAR:  Normal apical impulse, regular rate and rhythm, normal S1 and S2, no S3 or S4, and has no  murmur   ABDOMEN:  No scars, normal bowel sounds, soft, non-distended, non-tender, no masses palpated, no hepatolienomegaly  Musculoskeletal: Normal  Extremities: Normal, peripheral pulses normal, , has no edema   NEUROLOGIC:  Awake, alert, oriented to name, place and time. Cranial nerves II-XII are grossly intact. Motor is  intact. Sensory is intact. ,  and gait is normal.    DATA:    CBC:   Recent Labs     01/28/22  1426 01/29/22  0539   WBC 5.4 5.2   HGB 14.2 13.0*    156    CMP:  Recent Labs     01/28/22  1426 01/29/22  0539   * 152*   K 3.5 3.6   * 114*   CO2 21 20*   BUN 97* 96*   CREATININE 3.1* 2.5*   CALCIUM 9.2 8.7   PROT 6.9  --    LABALBU 3.3* 3.2*   BILITOT 0.7  --    ALKPHOS 72  --    AST 32  --    ALT 20  --      Lipids: No results found for: CHOL, HDL, TRIG  Glucose:   Recent Labs     01/29/22  0809 01/29/22  1245 01/29/22  1730   POCGLU 160* 185* 190*     Hemoglobin A1C:   Lab Results   Component Value Date    LABA1C 6.0 01/29/2022     Free T4: No results found for: T4FREE  Free T3: No results found for: FT3  TSH High Sensitivity: No results found for: TSHHS    XR CHEST PORTABLE   Final Result   Mild bibasilar airspace disease, greater on the left, most likely pneumonia. VL DUP LOWER EXTREMITY VENOUS BILATERAL   Final Result   Left anterior tibial vein is not visualized and patient trying to get out of   bed during the exam.      No definite deep venous thrombosis is demonstrated. NM LUNG SCAN PERFUSION ONLY   Final Result   Low Probability for Pulmonary Embolus. CT HEAD WO CONTRAST   Final Result   1. No acute intracranial abnormality. 2. Diffuse parenchymal volume loss with mild-to-moderate chronic white matter   microvascular ischemic changes.    3. Chronic lacunar infarct in the right thalamus. XR CHEST PORTABLE   Final Result   Ill-defined left-greater-than-right bibasilar airspace disease most   consistent with pneumonia. Scheduled Medicines   Medications:    cefTRIAXone (ROCEPHIN) IV  1,000 mg IntraVENous Daily    baricitinib  1 mg Oral Daily    insulin lispro  0-3 Units SubCUTAneous 2 times per day    insulin glargine  10 Units SubCUTAneous Nightly    sodium chloride flush  5-40 mL IntraVENous 2 times per day    dexamethasone  6 mg IntraVENous Q24H    Vitamin D  2,000 Units Oral Daily    heparin (porcine)  5,000 Units SubCUTAneous 3 times per day    insulin lispro  0-6 Units SubCUTAneous TID WC    aspirin  81 mg Oral Daily    famotidine  10 mg Oral Q48H    levofloxacin  750 mg IntraVENous Q48H      Infusions:    sodium chloride 75 mL/hr at 01/29/22 1234         IMPRESSION    Patient Active Problem List   Diagnosis    Acute kidney injury ([facility] [address]        Diabetes mellitus type II mild       Bilateral pneumonia possibly Covid related    RECOMMENDATIONS:      1. Reviewed POC blood glucose . Labs and X ray results   2. Reviewed Home and Current Medicines   3. Will Start On Correction bolus Humalog/ Lantus Insulin regime   4. Monitor Blood glucose frequently   5. Modify  the dose of Insulin  as needed        Will follow with you  Again thank you for sharing pt's care with me.      Truly yours,       Bertha Anna MD

## 2022-01-30 LAB
ALBUMIN SERPL-MCNC: 3.9 GM/DL (ref 3.4–5)
ALBUMIN SERPL-MCNC: 3.9 GM/DL (ref 3.4–5)
ALP BLD-CCNC: 84 IU/L (ref 40–129)
ALT SERPL-CCNC: 48 U/L (ref 10–40)
ANION GAP SERPL CALCULATED.3IONS-SCNC: 20 MMOL/L (ref 4–16)
APTT: 37.3 SECONDS (ref 25.1–37.1)
AST SERPL-CCNC: 74 IU/L (ref 15–37)
BANDED NEUTROPHILS ABSOLUTE COUNT: 0.23 K/CU MM
BANDED NEUTROPHILS RELATIVE PERCENT: 4 % (ref 5–11)
BILIRUB SERPL-MCNC: 0.7 MG/DL (ref 0–1)
BILIRUBIN DIRECT: 0.2 MG/DL (ref 0–0.3)
BILIRUBIN, INDIRECT: 0.5 MG/DL (ref 0–0.7)
BUN BLDV-MCNC: 79 MG/DL (ref 6–23)
CALCIUM SERPL-MCNC: 9.3 MG/DL (ref 8.3–10.6)
CHLORIDE BLD-SCNC: 115 MMOL/L (ref 99–110)
CO2: 20 MMOL/L (ref 21–32)
CREAT SERPL-MCNC: 2.3 MG/DL (ref 0.9–1.3)
DIFFERENTIAL TYPE: ABNORMAL
FIBRINOGEN LEVEL: 632 MG/DL (ref 196.9–442.1)
GFR AFRICAN AMERICAN: 33 ML/MIN/1.73M2
GFR NON-AFRICAN AMERICAN: 28 ML/MIN/1.73M2
GLUCOSE BLD-MCNC: 132 MG/DL (ref 70–99)
GLUCOSE BLD-MCNC: 153 MG/DL (ref 70–99)
GLUCOSE BLD-MCNC: 170 MG/DL (ref 70–99)
GLUCOSE BLD-MCNC: 187 MG/DL (ref 70–99)
GLUCOSE BLD-MCNC: 204 MG/DL (ref 70–99)
GLUCOSE BLD-MCNC: 213 MG/DL (ref 70–99)
HCT VFR BLD CALC: 46.2 % (ref 42–52)
HEMOGLOBIN: 14.6 GM/DL (ref 13.5–18)
INR BLD: 3.38 INDEX
LACTATE: 3.4 MMOL/L (ref 0.4–2)
LYMPHOCYTES ABSOLUTE: 1.3 K/CU MM
LYMPHOCYTES RELATIVE PERCENT: 23 % (ref 24–44)
MCH RBC QN AUTO: 30 PG (ref 27–31)
MCHC RBC AUTO-ENTMCNC: 31.6 % (ref 32–36)
MCV RBC AUTO: 95.1 FL (ref 78–100)
METAMYELOCYTES ABSOLUTE COUNT: 0.06 K/CU MM
METAMYELOCYTES PERCENT: 1 %
MONOCYTES ABSOLUTE: 0.3 K/CU MM
MONOCYTES RELATIVE PERCENT: 5 % (ref 0–4)
PDW BLD-RTO: 14.4 % (ref 11.7–14.9)
PHOSPHORUS: 3.1 MG/DL (ref 2.5–4.9)
PLATELET # BLD: 168 K/CU MM (ref 140–440)
PLT MORPHOLOGY: ADEQUATE
PMV BLD AUTO: 10.2 FL (ref 7.5–11.1)
POTASSIUM SERPL-SCNC: 3.6 MMOL/L (ref 3.5–5.1)
PROTHROMBIN TIME: 44.1 SECONDS (ref 11.7–14.5)
RBC # BLD: 4.86 M/CU MM (ref 4.6–6.2)
SEGMENTED NEUTROPHILS ABSOLUTE COUNT: 3.9 K/CU MM
SEGMENTED NEUTROPHILS RELATIVE PERCENT: 67 % (ref 36–66)
SODIUM BLD-SCNC: 155 MMOL/L (ref 135–145)
TOTAL PROTEIN: 7.3 GM/DL (ref 6.4–8.2)
TROPONIN T: <0.01 NG/ML
WBC # BLD: 5.8 K/CU MM (ref 4–10.5)
WBC # BLD: ABNORMAL 10*3/UL

## 2022-01-30 PROCEDURE — 36415 COLL VENOUS BLD VENIPUNCTURE: CPT

## 2022-01-30 PROCEDURE — 6370000000 HC RX 637 (ALT 250 FOR IP): Performed by: PHYSICIAN ASSISTANT

## 2022-01-30 PROCEDURE — 80048 BASIC METABOLIC PNL TOTAL CA: CPT

## 2022-01-30 PROCEDURE — 6360000002 HC RX W HCPCS: Performed by: FAMILY MEDICINE

## 2022-01-30 PROCEDURE — 80053 COMPREHEN METABOLIC PANEL: CPT

## 2022-01-30 PROCEDURE — 94761 N-INVAS EAR/PLS OXIMETRY MLT: CPT

## 2022-01-30 PROCEDURE — 6370000000 HC RX 637 (ALT 250 FOR IP): Performed by: INTERNAL MEDICINE

## 2022-01-30 PROCEDURE — 85730 THROMBOPLASTIN TIME PARTIAL: CPT

## 2022-01-30 PROCEDURE — 82248 BILIRUBIN DIRECT: CPT

## 2022-01-30 PROCEDURE — 82962 GLUCOSE BLOOD TEST: CPT

## 2022-01-30 PROCEDURE — 2700000000 HC OXYGEN THERAPY PER DAY

## 2022-01-30 PROCEDURE — 6360000002 HC RX W HCPCS

## 2022-01-30 PROCEDURE — 84100 ASSAY OF PHOSPHORUS: CPT

## 2022-01-30 PROCEDURE — 6360000002 HC RX W HCPCS: Performed by: NURSE PRACTITIONER

## 2022-01-30 PROCEDURE — 1200000000 HC SEMI PRIVATE

## 2022-01-30 PROCEDURE — 83605 ASSAY OF LACTIC ACID: CPT

## 2022-01-30 PROCEDURE — 2580000003 HC RX 258: Performed by: INTERNAL MEDICINE

## 2022-01-30 PROCEDURE — 76937 US GUIDE VASCULAR ACCESS: CPT

## 2022-01-30 PROCEDURE — 6370000000 HC RX 637 (ALT 250 FOR IP): Performed by: NURSE PRACTITIONER

## 2022-01-30 PROCEDURE — 86141 C-REACTIVE PROTEIN HS: CPT

## 2022-01-30 PROCEDURE — 85027 COMPLETE CBC AUTOMATED: CPT

## 2022-01-30 PROCEDURE — 85007 BL SMEAR W/DIFF WBC COUNT: CPT

## 2022-01-30 PROCEDURE — 85610 PROTHROMBIN TIME: CPT

## 2022-01-30 PROCEDURE — 85384 FIBRINOGEN ACTIVITY: CPT

## 2022-01-30 PROCEDURE — XW0DXM6 INTRODUCTION OF BARICITINIB INTO MOUTH AND PHARYNX, EXTERNAL APPROACH, NEW TECHNOLOGY GROUP 6: ICD-10-PCS | Performed by: FAMILY MEDICINE

## 2022-01-30 PROCEDURE — 84484 ASSAY OF TROPONIN QUANT: CPT

## 2022-01-30 PROCEDURE — 6360000002 HC RX W HCPCS: Performed by: INTERNAL MEDICINE

## 2022-01-30 PROCEDURE — 2580000003 HC RX 258: Performed by: NURSE PRACTITIONER

## 2022-01-30 PROCEDURE — 6370000000 HC RX 637 (ALT 250 FOR IP): Performed by: FAMILY MEDICINE

## 2022-01-30 RX ORDER — DEXTROSE MONOHYDRATE 50 MG/ML
INJECTION, SOLUTION INTRAVENOUS CONTINUOUS
Status: DISCONTINUED | OUTPATIENT
Start: 2022-01-30 | End: 2022-02-04

## 2022-01-30 RX ORDER — LANOLIN ALCOHOL/MO/W.PET/CERES
3 CREAM (GRAM) TOPICAL ONCE
Status: COMPLETED | OUTPATIENT
Start: 2022-01-30 | End: 2022-01-30

## 2022-01-30 RX ORDER — DEXTROSE MONOHYDRATE 50 MG/ML
INJECTION, SOLUTION INTRAVENOUS CONTINUOUS
Status: DISCONTINUED | OUTPATIENT
Start: 2022-01-30 | End: 2022-01-30

## 2022-01-30 RX ADMIN — HEPARIN SODIUM 5000 UNITS: 5000 INJECTION, SOLUTION INTRAVENOUS; SUBCUTANEOUS at 06:59

## 2022-01-30 RX ADMIN — DEXAMETHASONE SODIUM PHOSPHATE 6 MG: 10 INJECTION INTRAMUSCULAR; INTRAVENOUS at 13:59

## 2022-01-30 RX ADMIN — METOPROLOL TARTRATE 25 MG: 25 TABLET, FILM COATED ORAL at 21:35

## 2022-01-30 RX ADMIN — Medication 3 MG: at 04:05

## 2022-01-30 RX ADMIN — LEVOFLOXACIN 750 MG: 5 INJECTION, SOLUTION INTRAVENOUS at 21:43

## 2022-01-30 RX ADMIN — HEPARIN SODIUM 5000 UNITS: 5000 INJECTION, SOLUTION INTRAVENOUS; SUBCUTANEOUS at 13:59

## 2022-01-30 RX ADMIN — BARICITINIB 1 MG: 2 TABLET, FILM COATED ORAL at 10:49

## 2022-01-30 RX ADMIN — INSULIN GLARGINE 10 UNITS: 100 INJECTION, SOLUTION SUBCUTANEOUS at 21:44

## 2022-01-30 RX ADMIN — CEFTRIAXONE SODIUM 1000 MG: 1 INJECTION, POWDER, FOR SOLUTION INTRAMUSCULAR; INTRAVENOUS at 10:49

## 2022-01-30 RX ADMIN — HYDRALAZINE HYDROCHLORIDE 10 MG: 20 INJECTION, SOLUTION INTRAMUSCULAR; INTRAVENOUS at 05:18

## 2022-01-30 RX ADMIN — SODIUM CHLORIDE: 4.5 INJECTION, SOLUTION INTRAVENOUS at 14:00

## 2022-01-30 RX ADMIN — ASPIRIN 81 MG CHEWABLE TABLET 81 MG: 81 TABLET CHEWABLE at 10:49

## 2022-01-30 RX ADMIN — HEPARIN SODIUM 5000 UNITS: 5000 INJECTION, SOLUTION INTRAVENOUS; SUBCUTANEOUS at 21:35

## 2022-01-30 RX ADMIN — DEXTROSE MONOHYDRATE: 50 INJECTION, SOLUTION INTRAVENOUS at 21:39

## 2022-01-30 ASSESSMENT — PAIN SCALES - GENERAL: PAINLEVEL_OUTOF10: 0

## 2022-01-30 NOTE — PROGRESS NOTES
Daily Progress Note     Pt. Awake, alert and feeling ok  HR stable, ST at 100 this am, BP stable  No CP, SOB stable    Covid PNA    On 6 L NC today    On ABx    Tx. Per pulm and primary    Tachycardia    HTN and tachycardia last night- given hydralazine and BP is better    HR is slightly elevated today    Will add lopressor today    Echo to be done tomorrow    On IVF for hypernatremia  Will follow    PAST MEDICAL HISTORY:  Diabetes, dysphagia, dysphonia, hyperlipidemia,  hypertension, kidney failure, and osteoarthritis.     PAST SURGICAL HISTORY:  Cervical spine surgery, endoscopy, and  colonoscopy in the past.     ALLERGIES:  No known drug allergies.     FAMILY HISTORY:  Reviewed and noncontributory.     SOCIAL HISTORY:  Former smoker, quit in 2010. He does not drink any  alcohol.     HOME MEDICATIONS:  Tegretol, glipizide, Lipitor 80 mg every night at  bedtime, lisinopril 10 mg daily, Neurontin, metformin, and OxyContin.   Objective:   /61   Pulse 100   Temp 97.8 °F (36.6 °C) (Oral)   Resp 20   Ht 6' (1.829 m)   Wt 157 lb 3 oz (71.3 kg)   SpO2 94%   BMI 21.32 kg/m²   No intake or output data in the 24 hours ending 01/30/22 1033    Medications:   Scheduled Meds:   insulin lispro  0-12 Units SubCUTAneous TID WC    insulin lispro  0-12 Units SubCUTAneous 2 times per day    metoprolol tartrate  25 mg Oral BID    cefTRIAXone (ROCEPHIN) IV  1,000 mg IntraVENous Daily    baricitinib  1 mg Oral Daily    insulin glargine  10 Units SubCUTAneous Nightly    sodium chloride flush  5-40 mL IntraVENous 2 times per day    dexamethasone  6 mg IntraVENous Q24H    Vitamin D  2,000 Units Oral Daily    heparin (porcine)  5,000 Units SubCUTAneous 3 times per day    aspirin  81 mg Oral Daily    famotidine  10 mg Oral Q48H    levofloxacin  750 mg IntraVENous Q48H      Infusions:   sodium chloride 75 mL/hr at 01/29/22 2039      PRN Meds:  sodium chloride flush, ondansetron **OR** ondansetron, polyethylene glycol, acetaminophen **OR** acetaminophen       Physical Exam:  Vitals:    01/30/22 0845   BP:    Pulse:    Resp:    Temp: 97.8 °F (36.6 °C)   SpO2:         General: AAO, NAD  Chest: Nontender  Cardiac: First and Second Heart Sounds are Normal, No Murmurs or Gallops noted  Lungs:Clear to auscultation and percussion. Abdomen: Soft, NT, ND, +BS  Extremities: No clubbing, no edema  Vascular:  Equal 2+ peripheral pulses. Lab Data:  CBC:   Recent Labs     01/28/22  1426 01/29/22  0539   WBC 5.4 5.2   HGB 14.2 13.0*   HCT 44.1 39.5*   MCV 93.4 94.0    156     BMP:   Recent Labs     01/28/22  1426 01/29/22  0539   * 152*   K 3.5 3.6   * 114*   CO2 21 20*   PHOS  --  2.6   BUN 97* 96*   CREATININE 3.1* 2.5*     LIVER PROFILE:   Recent Labs     01/28/22  1426 01/29/22  0539   AST 32  --    ALT 20  --    LIPASE 72* 55   BILITOT 0.7  --    ALKPHOS 72  --      PT/INR: No results for input(s): PROTIME, INR in the last 72 hours. APTT: No results for input(s): APTT in the last 72 hours. BNP:  No results for input(s): BNP in the last 72 hours.       Assessment:  Patient Active Problem List    Diagnosis Date Noted    Acute kidney injury ([facility] [address] 01/28/2022       Electronically signed by Padmini Davis PA-C on 1/30/2022 at 10:33 AM

## 2022-01-30 NOTE — PLAN OF CARE
Problem: Airway Clearance - Ineffective  Goal: Achieve or maintain patent airway  1/29/2022 2337 by Sarahy Hernandez RN  Outcome: Ongoing  1/29/2022 2030 by Sarahy Hernandez RN  Outcome: Ongoing     Problem: Gas Exchange - Impaired  Goal: Absence of hypoxia  1/29/2022 2337 by Sarahy Hernandez RN  Outcome: Ongoing  1/29/2022 2030 by Sarahy Hernandez RN  Outcome: Ongoing  Goal: Promote optimal lung function  1/29/2022 2337 by Sarahy Hernandez RN  Outcome: Ongoing  1/29/2022 2030 by Sarahy Hernandez RN  Outcome: Ongoing     Problem: Breathing Pattern - Ineffective  Goal: Ability to achieve and maintain a regular respiratory rate  1/29/2022 2337 by Sarahy Hernandez RN  Outcome: Ongoing  1/29/2022 2030 by Sarahy Hernandez RN  Outcome: Ongoing     Problem:  Body Temperature -  Risk of, Imbalanced  Goal: Ability to maintain a body temperature within defined limits  1/29/2022 2337 by Sarahy Hernandez RN  Outcome: Ongoing  1/29/2022 2030 by Sarahy Hernandez RN  Outcome: Ongoing  Goal: Will regain or maintain usual level of consciousness  1/29/2022 2337 by Sarahy Hernandez RN  Outcome: Ongoing  1/29/2022 2030 by Sarahy Hernandez RN  Outcome: Ongoing  Goal: Complications related to the disease process, condition or treatment will be avoided or minimized  1/29/2022 2337 by Sarahy Hernandez RN  Outcome: Ongoing  1/29/2022 2030 by Sarahy Hernandez RN  Outcome: Ongoing     Problem: Isolation Precautions - Risk of Spread of Infection  Goal: Prevent transmission of infection  1/29/2022 2337 by Sarahy Hernandez RN  Outcome: Ongoing  1/29/2022 2030 by Sarahy Hernandez RN  Outcome: Ongoing     Problem: Nutrition Deficits  Goal: Optimize nutritional status  1/29/2022 2337 by Sarahy Hernandez RN  Outcome: Ongoing  1/29/2022 2030 by Sarahy Hernandez RN  Outcome: Ongoing     Problem: Risk for Fluid Volume Deficit  Goal: Maintain normal heart rhythm  1/29/2022 2337 by Sarahy Hernandez RN  Outcome: Ongoing  1/29/2022 2030 by Sarahy Hernandez RN  Outcome: Ongoing  Goal: Maintain absence of muscle cramping  1/29/2022 2337 by Epifanio Hanson RN  Outcome: Ongoing  1/29/2022 2030 by Epifanio Hanson RN  Outcome: Ongoing  Goal: Maintain normal serum potassium, sodium, calcium, phosphorus, and pH  1/29/2022 2337 by Epifanio Hanson RN  Outcome: Ongoing  1/29/2022 2030 by Epifanio Hanson RN  Outcome: Ongoing     Problem: Loneliness or Risk for Loneliness  Goal: Demonstrate positive use of time alone when socialization is not possible  1/29/2022 2337 by Epifanio Hanson RN  Outcome: Ongoing  1/29/2022 2030 by Epifanio Hanson RN  Outcome: Ongoing     Problem: Fatigue  Goal: Verbalize increase energy and improved vitality  1/29/2022 2337 by Epifanio Hanson RN  Outcome: Ongoing  1/29/2022 2030 by Epifanio Hanson RN  Outcome: Ongoing     Problem: Patient Education: Go to Patient Education Activity  Goal: Patient/Family Education  1/29/2022 2337 by Epifanio Hanson RN  Outcome: Ongoing  1/29/2022 2030 by Epifanio Hanson RN  Outcome: Ongoing

## 2022-01-30 NOTE — CONSULTS
[address] [place] [address], [address] [facility]                                  CONSULTATION    PATIENT NAME: Diane Gore                    :        1945  MED REC NO:   [id number]                          ROOM:       3017  ACCOUNT NO:   [de-identified]                           ADMIT DATE: 2022  PROVIDER:     HUMAIRA Mora    CONSULT DATE:  2022    CHIEF COMPLAINT:  Shortness of breath. HISTORY OF PRESENT ILLNESS:  This is a 58-year-old male who is  unvaccinated with multiple risk factors for coronary artery disease, who  presented via squad to the hospital yesterday after  was called  for a well check. Apparently, he was very confused and very weak. Him  and his wife had progressive symptoms for the last two to three weeks  with poor appetite, cough, diarrhea. He tested positive for COVID. He  has multiple risk factors for heart failure and coronary disease  including diabetes, hyperlipidemia, and hypertension. At this time, on  his blood work his sodium is high. He appears to be a little bit  dehydrated. We are giving him IV fluid. He is starting to feel little  bit better per patient. PAST MEDICAL HISTORY:  Diabetes, dysphagia, dysphonia, hyperlipidemia,  hypertension, kidney failure, and osteoarthritis. PAST SURGICAL HISTORY:  Cervical spine surgery, endoscopy, and  colonoscopy in the past.    ALLERGIES:  No known drug allergies. FAMILY HISTORY:  Reviewed and noncontributory. SOCIAL HISTORY:  Former smoker, quit in . He does not drink any  alcohol. HOME MEDICATIONS:  Tegretol, glipizide, Lipitor 80 mg every night at  bedtime, lisinopril 10 mg daily, Neurontin, metformin, and OxyContin. REVIEW OF SYSTEMS:  A 10-point review of systems is reviewed and is  negative unless noted in the HPI. PHYSICAL EXAMINATION:  GENERAL:  Awake, alert male lying in bed, in no acute distress.   HEAD: Atraumatic, normocephalic. EYES:  No scleral erythema, discharge, or conjunctivitis noted. NECK:  Trachea is midline. No apparent masses. CARDIAC:  Regular rate and rhythm. No murmurs, rubs or gallops  auscultated. LUNGS:  Clear to auscultation bilaterally. Good rise and fall of chest.  ABDOMEN:  Soft, nontender. MUSCULOSKELETAL:  No obvious joint deformities. SKIN:  No erythema or ecchymosis noted. NEUROLOGIC:  Alert and oriented x4. PSYCH:  Normal mood and affect. IMAGING STUDIES:  CT head showed no acute intracranial abnormality. VQ  scan in the ER was low probability for PE. Ultrasound of the lower  extremities did not show any DVTs. Chest x-ray was done today that  showed mild bibasilar airspace disease, greater on left, most likely  pneumonia. EKG was done that showed normal sinus rhythm with  nonspecific ST abnormalities, not significantly changed from the prior. LABORATORY DATA:  Sodium is high at 152, creatinine is high at 2.5;  otherwise, electrolytes overall within normal limits. Albumin is low at  3.2. Lipase is stable at 55. CRP is elevated at 81.2. CBC is within  normal limits. BNP was elevated at 614. Troponin was negative. IMPRESSION:  A 77-year-old male with shortness of breath, fatigue, and  weakness, likely related to his COVID-19 infection. He appears  dehydrated at this time. We will give him IV fluids as his sodium is  low and his creatinine is high. We will check an echocardiogram on him. I do not know that he has ever had one before. He does have many risk  factors for heart failure and for coronary artery disease. We will  follow along with you at this time. Further treatment dictated by  hospital course. HUMAIRA Brandt    D: 01/29/2022 13:53:53       T: 01/29/2022 13:56:47     [email or web address]  Job#: [id number]     Doc#: [id number]    CC:   Herlinda Monroe MD

## 2022-01-30 NOTE — PROGRESS NOTES
Nephrology Progress Note  1/30/2022 12:28 PM  Subjective: Interval History: Carlos Ashford III is a 64 y.o. male with weakness but arousable tired in bed        Data:   Scheduled Meds:   insulin lispro  0-12 Units SubCUTAneous TID WC    insulin lispro  0-12 Units SubCUTAneous 2 times per day    metoprolol tartrate  25 mg Oral BID    cefTRIAXone (ROCEPHIN) IV  1,000 mg IntraVENous Daily    baricitinib  1 mg Oral Daily    insulin glargine  10 Units SubCUTAneous Nightly    sodium chloride flush  5-40 mL IntraVENous 2 times per day    dexamethasone  6 mg IntraVENous Q24H    Vitamin D  2,000 Units Oral Daily    heparin (porcine)  5,000 Units SubCUTAneous 3 times per day    aspirin  81 mg Oral Daily    famotidine  10 mg Oral Q48H    levofloxacin  750 mg IntraVENous Q48H     Continuous Infusions:   sodium chloride 75 mL/hr at 01/29/22 2039         CBC   Recent Labs     01/28/22  1426 01/29/22  0539   WBC 5.4 5.2   HGB 14.2 13.0*   HCT 44.1 39.5*    156      BMP   Recent Labs     01/28/22  1426 01/29/22  0539   * 152*   K 3.5 3.6   * 114*   CO2 21 20*   PHOS  --  2.6   BUN 97* 96*   CREATININE 3.1* 2.5*     Hepatic:   Recent Labs     01/28/22  1426   AST 32   ALT 20   BILITOT 0.7   ALKPHOS 72     Troponin: No results for input(s): TROPONINI in the last 72 hours. BNP: No results for input(s): BNP in the last 72 hours. Lipids: No results for input(s): CHOL, HDL in the last 72 hours. Invalid input(s): LDLCALCU  ABGs: No results found for: PHART, PO2ART, JTN1ANP  INR: No results for input(s): INR in the last 72 hours.   Renal Labs  Albumin:    Lab Results   Component Value Date    LABALBU 3.2 01/29/2022    LABALBU 100 05/30/2012     Calcium:    Lab Results   Component Value Date    CALCIUM 8.7 01/29/2022     Phosphorus:    Lab Results   Component Value Date    PHOS 2.6 01/29/2022     U/A:    Lab Results   Component Value Date    NITRU NEGATIVE 03/14/2017    COLORU YELLOW 03/14/2017 WBCUA 1 03/14/2017    RBCUA 1 03/14/2017    MUCUS RARE 03/14/2017    BACTERIA NEGATIVE 03/14/2017    CLARITYU CLEAR 03/14/2017    SPECGRAV 1.014 03/14/2017    UROBILINOGEN NORMAL 03/14/2017    BILIRUBINUR NEGATIVE 03/14/2017    BLOODU SMALL 03/14/2017    KETUA NEGATIVE 03/14/2017     ABG:  No results found for: PHART, MBJ4JCA, PO2ART, ZPC7IHF, BEART, THGBART, DDO6PHI, J5GPDNZJ  HgBA1c:    Lab Results   Component Value Date    LABA1C 6.0 01/29/2022     Microalbumen/Creatinine ratio:  No components found for: RUCREAT  TSH:  No results found for: TSH  IRON:  No results found for: IRON  Iron Saturation:  No components found for: PERCENTFE  TIBC:  No results found for: TIBC  FERRITIN:    Lab Results   Component Value Date    FERRITIN 2,498 01/28/2022     RPR:  No results found for: RPR  JEREMIE:  No results found for: ANATITER, JEREMIE  24 Hour Urine for Creatinine Clearance:  No components found for: CREAT4, UHRS10, UTV10      Objective:   I/O: No intake/output data recorded. No intake/output data recorded. No intake/output data recorded. Vitals: BP (!) 142/86   Pulse 104   Temp 97.8 °F (36.6 °C) (Oral)   Resp 17   Ht 6' (1.829 m)   Wt 157 lb 3 oz (71.3 kg)   SpO2 90%   BMI 21.32 kg/m²  {  General appearance: awake weak  HEENT: Head: Normal, normocephalic, atraumatic.   Neck: supple, symmetrical, trachea midline  Lungs: diminished breath sounds bilaterally  Heart: S1, S2 normal  Abdomen: abnormal findings:  soft nt  Extremities: edema trace  Neurologic: Mental status: alertness: Awake        Assessment and Plan:      IMP:  #1 COVID-19 pneumonia  #2 hypernatremia  #3 acute renal failure from ATN on CKD 3 creatinine 1.6  #4 type 2 diabetes  #5 hypertension  #6 weakness with some mild confusion    Plan     #1 treat above setting for Covid  #2 sodium slightly increased follow-up repeat labs  #3 creatinine holding at 2.5 slightly better will monitor for now  #4 monitor glucose  #5 blood pressure overall stable  #6 monitor affect still not improved yet  We will follow           Eloy Mitchell MD, MD

## 2022-01-30 NOTE — PROGRESS NOTES
pulmonary      SUBJECTIVE: no change in clinical status. He still feel sob     OBJECTIVE    VITALS:  BP (!) 181/104 Comment: NP [address] paged. Pulse 95   Temp 97.6 °F (36.4 °C) (Oral)   Resp 20   Ht 6' (1.829 m)   Wt 200 lb (90.7 kg)   SpO2 96%   BMI 27.12 kg/m²   HEAD AND FACE EXAM:  No throat injection, no active exudate,no thrush  NECK EXAM;No JVD, no masses, symmetrical  CHEST EXAM; Expansion equal and symmetrical, no masses  LUNG EXAM; Good breath sounds bilaterally. There are expiratory wheezes both lungs, there are crackles at both lung bases  CARDIOVASCULAR EXAM: Positive S1 and S2, no S3 or S4, no clicks ,no murmurs  RIGHT AND LEFT LOWER EXTRIMITY EXAM: No edema, no swelling, no inflamation  CNS EXAM: Alert and oriented X3          LABS   Lab Results   Component Value Date    WBC 5.2 01/29/2022    HGB 13.0 (L) 01/29/2022    HCT 39.5 (L) 01/29/2022    MCV 94.0 01/29/2022     01/29/2022     Lab Results   Component Value Date    CREATININE 2.5 (H) 01/29/2022    BUN 96 (H) 01/29/2022     (H) 01/29/2022    K 3.6 01/29/2022     (H) 01/29/2022    CO2 20 (L) 01/29/2022     Lab Results   Component Value Date    INR 1.14 03/14/2017    PROTIME 13.0 (H) 03/14/2017          Lab Results   Component Value Date    PHOS 2.6 01/29/2022      No results for input(s): PH, PO2ART, DVS0YFM, HCO3, BEART, O2SAT in the last 72 hours. Wt Readings from Last 3 Encounters:   01/28/22 200 lb (90.7 kg)   03/14/17 200 lb (90.7 kg)   02/19/16 200 lb 6.4 oz (90.9 kg)               ASSESMENT  Ac resp failure  covid pneumonia  Bact superadded pneumonia        PLAN  1. antibx  2.  Decadron  3. baricitinab  4. o2 adm    1/30/2022  Trace Dawson MD, M.D.

## 2022-01-30 NOTE — PROGRESS NOTES
V2.0  [facility] Hospitalist Progress Note      Name:  Nathalie Hutchison III /Age/Sex: 1945  (64 y.o. male)   MRN & CSN:  [id number] & [id number] Encounter Date/Time: 2022 7:42 PM EST    Location:  [address] [place] [address] PCP: Shai Gruber MD       Hospital Day: 2    Assessment and Plan:   Liza De La Torre is a 64 y.o. male         1. Acute kidney injury (Cr 3.1, BUN 97)  2. COVID 19 PNA   3. Hypernatremia Na 151 -appreciate nephrology consult  4. Metabolic encephalopathy  5. D dimer elevated in setting of MARCO  6. T2 DM  7. DVT prophylaxis        Liza De La Torre is a 64 y.o.  male  who presents with Acute kidney injury ([address]     CXR  Consistent with PNA. Edema can not be ruled out. Patient was tachycardic, afebrile without leukocytosis upon admittions    Admission labs:  COVID +, procal .409, .6, Ferritin 2498, D dimer 515, Lactate 1.8, .7    Baricitinib from  until     Oxygen: Requiring 7 L on     Dexamethasone from  until      Elevated D-dimer: VQ scan on  is low probability    DVT prophylaxis: Heparin 5000 units subcu 3 times a day. On  creatinine clearance is 27. Volume status: Monitor      Procalcitonin: 0.409 upon admission    Antibacterial antibiotics. Levaquin given the day of admission. On day 1 changed to Rocephin. On Tegretol for neuropathy      UA: Is pending    Lower extremity ultrasound: No definite DVT, suboptimal study         Subjective:     Chief Complaint: Fatigue       Liza De La Torre is a 64 y.o. male      When I walked in he stated that \"I am ready to rock 'n' roll\". However Jovan's \"it became evident that he is having difficulty with his speech. Vaccine: Has not had 1      Review of Systems:      No vomiting or bleeding noted.     Objective:   No intake or output data in the 24 hours ending 22     Vitals:   Vitals:    22 1258   BP:    Pulse:    Resp:    Temp: MM    Monocytes Absolute 0.2 K/CU MM    Eosinophils Absolute 0.0 K/CU MM    Basophils Absolute 0.0 K/CU MM    Nucleated RBC % 0.0 %    Total Nucleated RBC 0.0 K/CU MM    Total Immature Neutrophil 0.05 K/CU MM    Immature Neutrophil % 1.0 (H) 0 - 0.43 %   C-Reactive Protein    Collection Time: 01/29/22  5:39 AM   Result Value Ref Range    CRP, High Sensitivity 81.2 mg/L   Procalcitonin    Collection Time: 01/29/22  5:39 AM   Result Value Ref Range    Procalcitonin 0.317    D-Dimer, Quantitative    Collection Time: 01/29/22  5:39 AM   Result Value Ref Range    D-Dimer, Quant 465 (H) <230 NG/mL(DDU)   Vitamin D 25 Hydroxy    Collection Time: 01/29/22  5:39 AM   Result Value Ref Range    Vit D, 25-Hydroxy 13.95 (L) >20 NG/ML   Renal Function Panel    Collection Time: 01/29/22  5:39 AM   Result Value Ref Range    Sodium 152 (H) 135 - 145 MMOL/L    Potassium 3.6 3.5 - 5.1 MMOL/L    Chloride 114 (H) 99 - 110 mMol/L    CO2 20 (L) 21 - 32 MMOL/L    Anion Gap 18 (H) 4 - 16    BUN 96 (H) 6 - 23 MG/DL    CREATININE 2.5 (H) 0.9 - 1.3 MG/DL    Glucose 189 (H) 70 - 99 MG/DL    Calcium 8.7 8.3 - 10.6 MG/DL    GFR Non- 25 (L) >60 mL/min/1.73m2    GFR  30 (L) >60 mL/min/1.73m2    Albumin 3.2 (L) 3.4 - 5.0 GM/DL    Phosphorus 2.6 2.5 - 4.9 MG/DL   Hemoglobin A1c    Collection Time: 01/29/22  5:39 AM   Result Value Ref Range    Hemoglobin A1C 6.0 4.2 - 6.3 %    eAG 126 mg/dL   Lipase    Collection Time: 01/29/22  5:39 AM   Result Value Ref Range    Lipase 55 13 - 60 IU/L   Procalcitonin    Collection Time: 01/29/22  5:39 AM   Result Value Ref Range    Procalcitonin 0.317    Lactate Dehydrogenase    Collection Time: 01/29/22  5:39 AM   Result Value Ref Range     (H) 120 - 246 IU/L   POCT Glucose    Collection Time: 01/29/22  8:09 AM   Result Value Ref Range    POC Glucose 160 (H) 70 - 99 MG/DL   POCT Glucose    Collection Time: 01/29/22 12:45 PM   Result Value Ref Range    POC Glucose 185 (H) 70 - 99 MG/DL   POCT Glucose    Collection Time: 01/29/22  5:30 PM   Result Value Ref Range    POC Glucose 190 (H) 70 - 99 MG/DL        Imaging/Diagnostics Last 24 Hours   CT HEAD WO CONTRAST    Result Date: 1/28/2022  EXAMINATION: CT OF THE HEAD WITHOUT CONTRAST  1/28/2022 3:12 pm TECHNIQUE: CT of the head was performed without the administration of intravenous contrast. Dose modulation, iterative reconstruction, and/or weight based adjustment of the mA/kV was utilized to reduce the radiation dose to as low as reasonably achievable. COMPARISON: 03/14/2017 HISTORY: ORDERING SYSTEM PROVIDED HISTORY: confusion TECHNOLOGIST PROVIDED HISTORY: Has a \"code stroke\" or \"stroke alert\" been called? ->No Reason for exam:->confusion Decision Support Exception - unselect if not a suspected or confirmed emergency medical condition->Emergency Medical Condition (MA) Reason for Exam: confusion Additional signs and symptoms: unknown Relevant Medical/Surgical History: poor historian FINDINGS: BRAIN/VENTRICLES: There is no acute intracranial hemorrhage, mass effect or midline shift. No abnormal extra-axial fluid collection. The gray-white differentiation is maintained without evidence of an acute infarct. There is no evidence of hydrocephalus. Diffuse parenchymal volume loss with mild-to-moderate chronic white matter microvascular ischemic changes. Chronic lacunar infarct in the right thalamus. ORBITS: The visualized portion of the orbits demonstrate no acute abnormality. SINUSES: The visualized paranasal sinuses and mastoid air cells demonstrate no acute abnormality. SOFT TISSUES/SKULL:  No acute abnormality of the visualized skull or soft tissues. 1. No acute intracranial abnormality. 2. Diffuse parenchymal volume loss with mild-to-moderate chronic white matter microvascular ischemic changes. 3. Chronic lacunar infarct in the right thalamus.      NM LUNG SCAN PERFUSION ONLY    Result Date: 1/28/2022  EXAMINATION: NUCLEAR MEDICINE PERFUSION SCAN. 1/28/2022 TECHNIQUE: Ventilation not performed as part of COVID-19 safety precautions. 3.1 millicuries of Tc 09C MAA was administered intravenously prior to planar imaging of the lungs in multiple projections. COMPARISON: Chest radiograph 01/28/2022. HISTORY: ORDERING SYSTEM PROVIDED HISTORY: R/O PE, elevated D dimer TECHNOLOGIST PROVIDED HISTORY: Reason for exam:->R/O PE, elevated D dimer Reason for Exam: elev d-dimer FINDINGS: PERFUSION: Distribution of radiotracer is homogeneous. No segmental defects identified. CHEST RADIOGRAPH: There are localized opacities in the left base. .     Low Probability for Pulmonary Embolus. XR CHEST PORTABLE    Result Date: 1/29/2022  EXAMINATION: ONE XRAY VIEW OF THE CHEST 1/29/2022 6:54 am COMPARISON: 1/28/2022 HISTORY: ORDERING SYSTEM PROVIDED HISTORY: SOB TECHNOLOGIST PROVIDED HISTORY: Reason for exam:->SOB Reason for Exam: SOB FINDINGS: Fusion plate in the lower cervical spine is partially visualized and unremarkable. The cardiac silhouette is normal.  There is moderate thoracic aortic calcification. There are ground-glass opacities in the lung bases, greater on the left. There is no significant interval change. No pneumothorax is identified. Mild bibasilar airspace disease, greater on the left, most likely pneumonia. XR CHEST PORTABLE    Result Date: 1/28/2022  EXAMINATION: ONE XRAY VIEW OF THE CHEST 1/28/2022 2:39 pm COMPARISON: 03/14/2017 HISTORY: ORDERING SYSTEM PROVIDED HISTORY: fatigue TECHNOLOGIST PROVIDED HISTORY: Reason for exam:->fatigue Reason for Exam: fatigue Additional signs and symptoms: NA Relevant Medical/Surgical History: DIABETES, HYPERTENSION FINDINGS: There is ill-defined left-greater-than-right bibasilar airspace disease. There is no pneumothorax or pleural effusion. Heart size is within normal limits. There is a skin fold superimposed on the lateral right lung. Bilateral AC joint arthropathy. Cervical fusion hardware. No acute bone finding. Ill-defined left-greater-than-right bibasilar airspace disease most consistent with pneumonia. VL DUP LOWER EXTREMITY VENOUS BILATERAL    Result Date: 1/29/2022  EXAMINATION: DUPLEX VENOUS ULTRASOUND OF THE BILATERAL LOWER EXTREMITIES1/29/2022 2:26 am TECHNIQUE: Duplex ultrasound using B-mode/gray scaled imaging, Doppler spectral analysis and color flow Doppler was obtained of the deep venous structures of the lower bilateral extremities. COMPARISON: None. HISTORY: ORDERING SYSTEM PROVIDED HISTORY: Rule out DVT, elevated D dimer. TECHNOLOGIST PROVIDED HISTORY: Reason for exam:->Rule out DVT, elevated D dimer. FINDINGS: The left anterior tibial vein is not seen and patient is trying to get out of bed during the exam.  Arterial calcifications in the femoral arteries are also causing some shadowing across the adjacent veins. However no definite deep venous thrombosis is seen within the veins. Left anterior tibial vein is not visualized and patient trying to get out of bed during the exam. No definite deep venous thrombosis is demonstrated.        Electronically signed by Sofia Michelle MD on 1/29/2022 at 7:42 PM

## 2022-01-30 NOTE — PROGRESS NOTES
Progress Note( Dr. Gabby Padgett)  1/30/2022  Subjective:   Admit Date: 1/28/2022  PCP: Darcy Thompson MD    Admitted For : altered mental state with generalized weakness positive for Covid pneumonia    Consulted For: Better contr ol of blood glucose    Interval History: Feels somewhat better  Has hypernatremia    Denies any chest pains,   Denies SOB . Denies nausea or vomiting. No new bowel or bladder symptoms. No intake or output data in the 24 hours ending 01/30/22 1538    DATA    CBC: Recent Labs     01/28/22  1426 01/29/22  0539 01/30/22  1416   WBC 5.4 5.2 5.8   HGB 14.2 13.0* 14.6    156 168    CMP:  Recent Labs     01/28/22  1426 01/29/22  0539 01/30/22  1416   * 152* 155*   K 3.5 3.6 3.6   * 114* 115*   CO2 21 20* 20*   BUN 97* 96* 79*   CREATININE 3.1* 2.5* 2.3*   CALCIUM 9.2 8.7 9.3   PROT 6.9  --  7.3   LABALBU 3.3* 3.2* 3.9  3.9   BILITOT 0.7  --  0.7   ALKPHOS 72  --  84   AST 32  --  74*   ALT 20  --  48*     Lipids: No results found for: CHOL, HDL, TRIG  Glucose:  Recent Labs     01/30/22  0335 01/30/22  0842 01/30/22  1354   POCGLU 153* 132* 170*     [id number]:  Lab Results   Component Value Date    LABA1C 6.0 01/29/2022     High Sensitivity TSH: No results found for: TSHHS  Free T3: No results found for: FT3  Free T4:No results found for: T4FREE    XR CHEST PORTABLE   Final Result   Mild bibasilar airspace disease, greater on the left, most likely pneumonia. VL DUP LOWER EXTREMITY VENOUS BILATERAL   Final Result   Left anterior tibial vein is not visualized and patient trying to get out of   bed during the exam.      No definite deep venous thrombosis is demonstrated. NM LUNG SCAN PERFUSION ONLY   Final Result   Low Probability for Pulmonary Embolus. CT HEAD WO CONTRAST   Final Result   1. No acute intracranial abnormality. 2. Diffuse parenchymal volume loss with mild-to-moderate chronic white matter   microvascular ischemic changes.    3. Chronic lacunar infarct in the right thalamus. XR CHEST PORTABLE   Final Result   Ill-defined left-greater-than-right bibasilar airspace disease most   consistent with pneumonia. Scheduled Medicines   Medications:    insulin lispro  0-12 Units SubCUTAneous TID WC    insulin lispro  0-12 Units SubCUTAneous 2 times per day    metoprolol tartrate  25 mg Oral BID    cefTRIAXone (ROCEPHIN) IV  1,000 mg IntraVENous Daily    baricitinib  1 mg Oral Daily    insulin glargine  10 Units SubCUTAneous Nightly    sodium chloride flush  5-40 mL IntraVENous 2 times per day    dexamethasone  6 mg IntraVENous Q24H    Vitamin D  2,000 Units Oral Daily    heparin (porcine)  5,000 Units SubCUTAneous 3 times per day    aspirin  81 mg Oral Daily    famotidine  10 mg Oral Q48H    levofloxacin  750 mg IntraVENous Q48H      Infusions:    sodium chloride 75 mL/hr at 01/30/22 1400         Objective:   Vitals: /87   Pulse 98   Temp 97.8 °F (36.6 °C) (Oral)   Resp 27   Ht 6' (1.829 m)   Wt 157 lb 3 oz (71.3 kg)   SpO2 90%   BMI 21.32 kg/m²   General appearance: alert and cooperative with exam  Neck: no JVD or bruit  Thyroid : Normal lobes   Lungs: Has Vesicular Breath sounds   Heart:  regular rate and rhythm  Abdomen: soft, non-tender; bowel sounds normal; no masses,  no organomegaly  Musculoskeletal: Normal  Extremities: extremities normal, , no edema  Neurologic:  Awake, alert, oriented to name, place and time. Cranial nerves II-XII are grossly intact. Motor is  intact. Sensory is intact. ,  and gait is normal.    Assessment:     Patient Active Problem List:     Acute kidney injury ([facility] [address]     Diabetes mellitus type II mild       Bilateral pneumonia possibly Covid related            Plan:     1. Reviewed POC blood glucose . Labs and X ray results   2. Reviewed Current Medicines   3. On  Correction bolus Humalog/ Basal Lantus Insulin regime   4. Monitor Blood glucose frequently   5.  Modified  the dose of Insulin/ other medicines as needed   6. Will follow     .      Nilton Patino MD, MD

## 2022-01-30 NOTE — PROGRESS NOTES
1:  Called report to [facility] bedside nurse. Pt will be transfer to unit for further management. Marilee/RN    0724: Pt transfered to [facility]. JAYSHREE giraldo.

## 2022-01-30 NOTE — PROGRESS NOTES
Pt Milena Connell pulled up his IV by self. No bleeding noted at site. Placed IV consult for a new line. NP Delfina Jones aware.

## 2022-01-30 NOTE — PROGRESS NOTES
V2.0  [facility] [place] [facility] [place]      Name:  Ji Loaiza III /Age/Sex: 1945  (64 y.o. male)   MRN & CSN:  [id number] & [id number] Encounter Date/Time: 2022 7:42 PM EST    Location:  [id number]-D PCP: Adele Castorena MD       Hospital Day: 3    Assessment and Plan:   Ji Loaiza III is a 64 y.o. male         1. Acute kidney injury (Cr 3.1, BUN 97)  2. COVID 19 PNA   3. Hypernatremia Na 151 -appreciate nephrology consult  4. Metabolic encephalopathy  5. D dimer elevated in setting of MAROC  6. T2 DM  7. DVT prophylaxis        Padmini Logan is a 64 y.o.  male  who presents with Acute kidney injury ([address]     CXR  Consistent with PNA. Edema can not be ruled out. Patient was tachycardic, afebrile without leukocytosis upon admission    Admission labs:  COVID +, procal .409, .6, Ferritin 2498, D dimer 515, Lactate 1.8, .7    Baricitinib from  until     Oxygen: Requiring 7 L on  and on     Dexamethasone from  until      Elevated D-dimer: VQ scan on  is low probability    DVT prophylaxis: Heparin 5000 units subcu 3 times a day. On  creatinine clearance is 27. Volume status: Monitor      Procalcitonin: 0.409 upon admission    Antibacterial antibiotics. Levaquin given the day of admission. On day 1 changed to Rocephin.  [facility] day 2-continue Rocephin    Fluids:  On hospital day 2 he is on normal saline at 75    Electrolytes: Sodium level was 151 on arrival, 152 on hospital day 2, and is 155 on hospital day 3, nephrology managing    Metabolic encephalopathy  Less alert on hospital day 2    Renal failure-creatinine is down to 2.3 on hospital day 2, improved      On Tegretol for neuropathy      UA: Is pending    Lower extremity ultrasound: No definite DVT, suboptimal study         Subjective:     Chief Complaint: Fatigue       Padmini Logan is a 64 y.o. male           His nurse stated that he tried to get out of bed earlier today    When I asked him how old he was, he replied \"89\"    I updated his wife who is upstairs            January 29: When I walked in he stated that \"I am ready to rock 'n' roll\". However Jovan's \"it became evident that he is having difficulty with his speech. Vaccine: Has not had 1      Review of Systems:      No vomiting or bleeding noted. Objective:   No intake or output data in the 24 hours ending 01/30/22 1648     Vitals:   Vitals:    01/30/22 1528   BP:    Pulse:    Resp: 27   Temp:    SpO2: 90%       Physical Exam:     General: He wanted to go home. He was not redirectable. I explained to him that he was on 7 L oxygen.   Eyes: EOMI  ENT: neck supple  Respiratory: Clear to auscultation  Musculoskeletal: No edema  Psych: Anxious    Medications:   Medications:    insulin lispro  0-12 Units SubCUTAneous TID WC    insulin lispro  0-12 Units SubCUTAneous 2 times per day    metoprolol tartrate  25 mg Oral BID    cefTRIAXone (ROCEPHIN) IV  1,000 mg IntraVENous Daily    baricitinib  1 mg Oral Daily    insulin glargine  10 Units SubCUTAneous Nightly    sodium chloride flush  5-40 mL IntraVENous 2 times per day    dexamethasone  6 mg IntraVENous Q24H    Vitamin D  2,000 Units Oral Daily    heparin (porcine)  5,000 Units SubCUTAneous 3 times per day    aspirin  81 mg Oral Daily    famotidine  10 mg Oral Q48H    levofloxacin  750 mg IntraVENous Q48H      Infusions:    sodium chloride 75 mL/hr at 01/30/22 1400     PRN Meds: sodium chloride flush, 5-40 mL, PRN  ondansetron, 4 mg, Q8H PRN   Or  ondansetron, 4 mg, Q6H PRN  polyethylene glycol, 17 g, Daily PRN  acetaminophen, 650 mg, Q6H PRN   Or  acetaminophen, 650 mg, Q6H PRN        Labs      Recent Results (from the past 24 hour(s))   POCT Glucose    Collection Time: 01/29/22  5:30 PM   Result Value Ref Range    POC Glucose 190 (H) 70 - 99 MG/DL   POCT Glucose    Collection Time: 01/29/22  8:42 PM   Result Value Ref Range    POC Glucose 200 (H) 70 - 99 MG/DL   POCT Glucose    Collection Time: 01/30/22  3:35 AM   Result Value Ref Range    POC Glucose 153 (H) 70 - 99 MG/DL   POCT Glucose    Collection Time: 01/30/22  8:42 AM   Result Value Ref Range    POC Glucose 132 (H) 70 - 99 MG/DL   POCT Glucose    Collection Time: 01/30/22  1:54 PM   Result Value Ref Range    POC Glucose 170 (H) 70 - 99 MG/DL   Renal Function Panel    Collection Time: 01/30/22  2:16 PM   Result Value Ref Range    Sodium 155 (H) 135 - 145 MMOL/L    Potassium 3.6 3.5 - 5.1 MMOL/L    Chloride 115 (H) 99 - 110 mMol/L    CO2 20 (L) 21 - 32 MMOL/L    Anion Gap 20 (H) 4 - 16    BUN 79 (H) 6 - 23 MG/DL    CREATININE 2.3 (H) 0.9 - 1.3 MG/DL    Glucose 187 (H) 70 - 99 MG/DL    Calcium 9.3 8.3 - 10.6 MG/DL    GFR Non-African American 28 (L) >60 mL/min/1.73m2    GFR  33 (L) >60 mL/min/1.73m2    Albumin 3.9 3.4 - 5.0 GM/DL    Phosphorus 3.1 2.5 - 4.9 MG/DL   CBC Auto Differential    Collection Time: 01/30/22  2:16 PM   Result Value Ref Range    WBC 5.8 4.0 - 10.5 K/CU MM    RBC 4.86 4.6 - 6.2 M/CU MM    Hemoglobin 14.6 13.5 - 18.0 GM/DL    Hematocrit 46.2 42 - 52 %    MCV 95.1 78 - 100 FL    MCH 30.0 27 - 31 PG    MCHC 31.6 (L) 32.0 - 36.0 %    RDW 14.4 11.7 - 14.9 %    Platelets 682 682 - 114 K/CU MM    MPV 10.2 7.5 - 11.1 FL   Protime-INR    Collection Time: 01/30/22  2:16 PM   Result Value Ref Range    Protime 44.1 (H) 11.7 - 14.5 SECONDS    INR 3.38 INDEX   APTT    Collection Time: 01/30/22  2:16 PM   Result Value Ref Range    aPTT 37.3 (H) 25.1 - 37.1 SECONDS   Fibrinogen    Collection Time: 01/30/22  2:16 PM   Result Value Ref Range    Fibrinogen 632 (H) 196.9 - 442.1 MG/DL   Troponin    Collection Time: 01/30/22  2:16 PM   Result Value Ref Range    Troponin T <0.010 <0.01 NG/ML   Lactic Acid, Plasma    Collection Time: 01/30/22  2:16 PM   Result Value Ref Range    Lactate 3.4 (HH) 0.4 - 2.0 mMOL/L   Hepatic function panel Collection Time: 01/30/22  2:16 PM   Result Value Ref Range    Albumin 3.9 3.4 - 5.0 GM/DL    Total Bilirubin 0.7 0.0 - 1.0 MG/DL    Bilirubin, Direct 0.2 0.0 - 0.3 MG/DL    Bilirubin, Indirect 0.5 0 - 0.7 MG/DL    Alkaline Phosphatase 84 40 - 129 IU/L    AST 74 (H) 15 - 37 IU/L    ALT 48 (H) 10 - 40 U/L    Total Protein 7.3 6.4 - 8.2 GM/DL

## 2022-01-30 NOTE — CONSULTS
Consult completed. Nexiva 20g 1.75 inch catheter inserted via ultrasound in patient's KANU. Brisk blood return noted and catheter flushes with ease. Patient tolerated well. Consult IV/PICC team if patient's needs change.

## 2022-01-30 NOTE — PROGRESS NOTES
Paged NP Darrel Degroot of BP reading of 181/104, MAP-125. Marilee/STEVEN. 6921: New orders for hydralazine IVPB.

## 2022-01-31 LAB
BACTERIA: ABNORMAL /HPF
BILIRUBIN URINE: NEGATIVE MG/DL
BLOOD, URINE: ABNORMAL
CHLORIDE URINE RANDOM: 30 MMOL/L (ref 43–210)
CLARITY: CLEAR
COLOR: YELLOW
CREATININE URINE: 82.4 MG/DL (ref 39–259)
GLUCOSE BLD-MCNC: 112 MG/DL (ref 70–99)
GLUCOSE BLD-MCNC: 128 MG/DL (ref 70–99)
GLUCOSE BLD-MCNC: 165 MG/DL (ref 70–99)
GLUCOSE BLD-MCNC: 216 MG/DL (ref 70–99)
GLUCOSE BLD-MCNC: 237 MG/DL (ref 70–99)
GLUCOSE, URINE: 100 MG/DL
HIGH SENSITIVE C-REACTIVE PROTEIN: 53.3 MG/L
HYALINE CASTS: 0 /LPF
KETONES, URINE: NEGATIVE MG/DL
LEGIONELLA URINARY AG: NEGATIVE
LEUKOCYTE ESTERASE, URINE: NEGATIVE
LV EF: 60 %
LVEF MODALITY: NORMAL
MUCUS: ABNORMAL HPF
NITRITE URINE, QUANTITATIVE: NEGATIVE
PH, URINE: 5.5 (ref 5–8)
POTASSIUM, UR: 43.7 MMOL/L (ref 22–119)
PROT/CREAT RATIO, UR: 0.9
PROTEIN UA: 30 MG/DL
RBC URINE: 22 /HPF (ref 0–3)
SODIUM URINE: 46 MMOL/L (ref 35–167)
SPECIFIC GRAVITY UA: 1.02 (ref 1–1.03)
STREP PNEUMONIAE ANTIGEN: NORMAL
URIC ACID CRYSTALS: ABNORMAL /HPF
URINE TOTAL PROTEIN: 74.3 MG/DL
UROBILINOGEN, URINE: NORMAL MG/DL (ref 0.2–1)
WBC UA: 3 /HPF (ref 0–2)

## 2022-01-31 PROCEDURE — 2580000003 HC RX 258: Performed by: INTERNAL MEDICINE

## 2022-01-31 PROCEDURE — 84300 ASSAY OF URINE SODIUM: CPT

## 2022-01-31 PROCEDURE — 84443 ASSAY THYROID STIM HORMONE: CPT

## 2022-01-31 PROCEDURE — 83735 ASSAY OF MAGNESIUM: CPT

## 2022-01-31 PROCEDURE — 82962 GLUCOSE BLOOD TEST: CPT

## 2022-01-31 PROCEDURE — 6370000000 HC RX 637 (ALT 250 FOR IP): Performed by: INTERNAL MEDICINE

## 2022-01-31 PROCEDURE — 84133 ASSAY OF URINE POTASSIUM: CPT

## 2022-01-31 PROCEDURE — 6360000002 HC RX W HCPCS: Performed by: INTERNAL MEDICINE

## 2022-01-31 PROCEDURE — 2580000003 HC RX 258: Performed by: FAMILY MEDICINE

## 2022-01-31 PROCEDURE — 82570 ASSAY OF URINE CREATININE: CPT

## 2022-01-31 PROCEDURE — 87899 AGENT NOS ASSAY W/OPTIC: CPT

## 2022-01-31 PROCEDURE — 6360000002 HC RX W HCPCS: Performed by: FAMILY MEDICINE

## 2022-01-31 PROCEDURE — 80076 HEPATIC FUNCTION PANEL: CPT

## 2022-01-31 PROCEDURE — 36415 COLL VENOUS BLD VENIPUNCTURE: CPT

## 2022-01-31 PROCEDURE — 6370000000 HC RX 637 (ALT 250 FOR IP): Performed by: NURSE PRACTITIONER

## 2022-01-31 PROCEDURE — 82436 ASSAY OF URINE CHLORIDE: CPT

## 2022-01-31 PROCEDURE — 2700000000 HC OXYGEN THERAPY PER DAY

## 2022-01-31 PROCEDURE — 87449 NOS EACH ORGANISM AG IA: CPT

## 2022-01-31 PROCEDURE — 85610 PROTHROMBIN TIME: CPT

## 2022-01-31 PROCEDURE — 82310 ASSAY OF CALCIUM: CPT

## 2022-01-31 PROCEDURE — 6370000000 HC RX 637 (ALT 250 FOR IP): Performed by: FAMILY MEDICINE

## 2022-01-31 PROCEDURE — 93306 TTE W/DOPPLER COMPLETE: CPT

## 2022-01-31 PROCEDURE — 80048 BASIC METABOLIC PNL TOTAL CA: CPT

## 2022-01-31 PROCEDURE — 87086 URINE CULTURE/COLONY COUNT: CPT

## 2022-01-31 PROCEDURE — 6360000004 HC RX CONTRAST MEDICATION

## 2022-01-31 PROCEDURE — 84156 ASSAY OF PROTEIN URINE: CPT

## 2022-01-31 PROCEDURE — 1200000000 HC SEMI PRIVATE

## 2022-01-31 PROCEDURE — 94761 N-INVAS EAR/PLS OXIMETRY MLT: CPT

## 2022-01-31 PROCEDURE — 81001 URINALYSIS AUTO W/SCOPE: CPT

## 2022-01-31 RX ORDER — METOPROLOL TARTRATE 50 MG/1
50 TABLET, FILM COATED ORAL 2 TIMES DAILY
Status: DISCONTINUED | OUTPATIENT
Start: 2022-01-31 | End: 2022-02-08 | Stop reason: RX

## 2022-01-31 RX ORDER — INSULIN GLARGINE 100 [IU]/ML
15 INJECTION, SOLUTION SUBCUTANEOUS NIGHTLY
Status: DISCONTINUED | OUTPATIENT
Start: 2022-01-31 | End: 2022-02-05

## 2022-01-31 RX ORDER — HYDROXYZINE PAMOATE 25 MG/1
50 CAPSULE ORAL ONCE
Status: COMPLETED | OUTPATIENT
Start: 2022-01-31 | End: 2022-01-31

## 2022-01-31 RX ADMIN — INSULIN GLARGINE 15 UNITS: 100 INJECTION, SOLUTION SUBCUTANEOUS at 22:11

## 2022-01-31 RX ADMIN — FAMOTIDINE 10 MG: 20 TABLET, FILM COATED ORAL at 08:38

## 2022-01-31 RX ADMIN — DEXTROSE MONOHYDRATE: 50 INJECTION, SOLUTION INTRAVENOUS at 20:06

## 2022-01-31 RX ADMIN — HEPARIN SODIUM 5000 UNITS: 5000 INJECTION, SOLUTION INTRAVENOUS; SUBCUTANEOUS at 22:07

## 2022-01-31 RX ADMIN — HEPARIN SODIUM 5000 UNITS: 5000 INJECTION, SOLUTION INTRAVENOUS; SUBCUTANEOUS at 16:35

## 2022-01-31 RX ADMIN — METOPROLOL TARTRATE 50 MG: 50 TABLET, FILM COATED ORAL at 10:21

## 2022-01-31 RX ADMIN — HEPARIN SODIUM 5000 UNITS: 5000 INJECTION, SOLUTION INTRAVENOUS; SUBCUTANEOUS at 05:36

## 2022-01-31 RX ADMIN — CEFTRIAXONE SODIUM 1000 MG: 1 INJECTION, POWDER, FOR SOLUTION INTRAMUSCULAR; INTRAVENOUS at 09:04

## 2022-01-31 RX ADMIN — DEXAMETHASONE SODIUM PHOSPHATE 6 MG: 10 INJECTION INTRAMUSCULAR; INTRAVENOUS at 16:36

## 2022-01-31 RX ADMIN — PERFLUTREN 2.2 MG: 6.52 INJECTION, SUSPENSION INTRAVENOUS at 10:05

## 2022-01-31 RX ADMIN — SODIUM CHLORIDE, PRESERVATIVE FREE 10 ML: 5 INJECTION INTRAVENOUS at 09:30

## 2022-01-31 RX ADMIN — BARICITINIB 1 MG: 2 TABLET, FILM COATED ORAL at 08:38

## 2022-01-31 RX ADMIN — Medication 2000 UNITS: at 08:39

## 2022-01-31 RX ADMIN — METOPROLOL TARTRATE 50 MG: 50 TABLET, FILM COATED ORAL at 22:07

## 2022-01-31 RX ADMIN — HYDROXYZINE PAMOATE 50 MG: 25 CAPSULE ORAL at 23:25

## 2022-01-31 RX ADMIN — ASPIRIN 81 MG CHEWABLE TABLET 81 MG: 81 TABLET CHEWABLE at 08:38

## 2022-01-31 ASSESSMENT — PAIN SCALES - GENERAL
PAINLEVEL_OUTOF10: 0
PAINLEVEL_OUTOF10: 0

## 2022-01-31 NOTE — PROGRESS NOTES
Nephrology Progress Note  1/31/2022 4:27 PM  Subjective: Interval History: [place] Hemphill is a 64 y.o. male doing okay but weak overall and now his wife is ill with Covid as well    Data:   Scheduled Meds:   insulin glargine  15 Units SubCUTAneous Nightly    metoprolol tartrate  50 mg Oral BID    insulin lispro  0-12 Units SubCUTAneous TID WC    insulin lispro  0-12 Units SubCUTAneous 2 times per day    cefTRIAXone (ROCEPHIN) IV  1,000 mg IntraVENous Daily    baricitinib  1 mg Oral Daily    sodium chloride flush  5-40 mL IntraVENous 2 times per day    dexamethasone  6 mg IntraVENous Q24H    Vitamin D  2,000 Units Oral Daily    heparin (porcine)  5,000 Units SubCUTAneous 3 times per day    aspirin  81 mg Oral Daily    famotidine  10 mg Oral Q48H    levofloxacin  750 mg IntraVENous Q48H     Continuous Infusions:   dextrose 75 mL/hr at 01/31/22 0858         CBC   Recent Labs     01/29/22  0539 01/30/22  1416   WBC 5.2 5.8   HGB 13.0* 14.6   HCT 39.5* 46.2    168      BMP   Recent Labs     01/29/22  0539 01/30/22  1416   * 155*   K 3.6 3.6   * 115*   CO2 20* 20*   PHOS 2.6 3.1   BUN 96* 79*   CREATININE 2.5* 2.3*     Hepatic:   Recent Labs     01/30/22  1416   AST 74*   ALT 48*   BILITOT 0.7   ALKPHOS 84     Troponin: No results for input(s): TROPONINI in the last 72 hours. BNP: No results for input(s): BNP in the last 72 hours. Lipids: No results for input(s): CHOL, HDL in the last 72 hours.     Invalid input(s): LDLCALCU  ABGs: No results found for: PHART, PO2ART, FWB8XCE  INR:   Recent Labs     01/30/22  1416   INR 3.38     Renal Labs  Albumin:    Lab Results   Component Value Date    LABALBU 3.9 01/30/2022    LABALBU 3.9 01/30/2022    LABALBU 100 05/30/2012     Calcium:    Lab Results   Component Value Date    CALCIUM 9.3 01/30/2022     Phosphorus:    Lab Results   Component Value Date    PHOS 3.1 01/30/2022     U/A:    Lab Results   Component Value Date    NITRU NEGATIVE 01/31/2022    COLORU YELLOW 01/31/2022    WBCUA 3 01/31/2022    RBCUA 22 01/31/2022    MUCUS RARE 01/31/2022    BACTERIA RARE 01/31/2022    CLARITYU CLEAR 01/31/2022    SPECGRAV 1.025 01/31/2022    UROBILINOGEN NORMAL 01/31/2022    BILIRUBINUR NEGATIVE 01/31/2022    BLOODU LARGE 01/31/2022    KETUA NEGATIVE 01/31/2022     ABG:  No results found for: PHART, EHK0EHK, PO2ART, NGZ4HTF, BEART, THGBART, IEA0LXH, L8TYXDRP  HgBA1c:    Lab Results   Component Value Date    LABA1C 6.0 01/29/2022     Microalbumen/Creatinine ratio:  No components found for: RUCREAT  TSH:  No results found for: TSH  IRON:  No results found for: IRON  Iron Saturation:  No components found for: PERCENTFE  TIBC:  No results found for: TIBC  FERRITIN:    Lab Results   Component Value Date    FERRITIN 2,498 01/28/2022     RPR:  No results found for: RPR  JEREMIE:  No results found for: ANATITER, JEREMIE  24 Hour Urine for Creatinine Clearance:  No components found for: CREAT4, UHRS10, UTV10      Objective:   I/O: No intake/output data recorded. No intake/output data recorded. I/O this shift:  In: 200 [P.O.:200]  Out: 250 [Urine:250]  Vitals: /80   Pulse 95   Temp 98 °F (36.7 °C) (Axillary)   Resp 20   Ht 6' (1.829 m)   Wt 157 lb 3 oz (71.3 kg)   SpO2 93%   BMI 21.32 kg/m²  {  General appearance: awake weak  HEENT: Head: Normal, normocephalic, atraumatic.   Neck: supple, symmetrical, trachea midline  Lungs: diminished breath sounds bilaterally  Heart: S1, S2 normal  Abdomen: abnormal findings:  soft nt  Extremities: edema trace  Neurologic: Mental status: alertness: Awake        Assessment and Plan:      IMP:  #1 COVID-19 pneumonia  #2 hypernatremia  #3 acute renal failure from ATN on CKD 3 creatinine 1.6  #4 type 2 diabetes  #5 hypertension  #6 weakness with some mild confusion    Plan     #1 treat above setting for Covid  #2 start D5W await repeat labs  #3 monitor repeat renal function  #4 monitor glucose No. 5 blood pressure holding stable  #6 monitor for affect and above setting           Zak Hawkins MD, MD

## 2022-01-31 NOTE — PROGRESS NOTES
pulmonary      SUBJECTIVE: breathing is stable     OBJECTIVE    VITALS:  BP (!) 149/90   Pulse 86   Temp 97.5 °F (36.4 °C) (Oral)   Resp 22   Ht 6' (1.829 m)   Wt 157 lb 3 oz (71.3 kg)   SpO2 94%   BMI 21.32 kg/m²   HEAD AND FACE EXAM:  No throat injection, no active exudate,no thrush  NECK EXAM;No JVD, no masses, symmetrical  CHEST EXAM; Expansion equal and symmetrical, no masses  LUNG EXAM; Good breath sounds bilaterally. There are expiratory wheezes both lungs, there are crackles at both lung bases  CARDIOVASCULAR EXAM: Positive S1 and S2, no S3 or S4, no clicks ,no murmurs  RIGHT AND LEFT LOWER EXTRIMITY EXAM: No edema, no swelling, no inflamation            LABS   Lab Results   Component Value Date    WBC 5.8 01/30/2022    HGB 14.6 01/30/2022    HCT 46.2 01/30/2022    MCV 95.1 01/30/2022     01/30/2022     Lab Results   Component Value Date    CREATININE 2.3 (H) 01/30/2022    BUN 79 (H) 01/30/2022     (H) 01/30/2022    K 3.6 01/30/2022     (H) 01/30/2022    CO2 20 (L) 01/30/2022     Lab Results   Component Value Date    INR 3.38 01/30/2022    PROTIME 44.1 (H) 01/30/2022          Lab Results   Component Value Date    PHOS 3.1 01/30/2022    PHOS 2.6 01/29/2022      No results for input(s): PH, PO2ART, FNT6VMG, HCO3, BEART, O2SAT in the last 72 hours. Wt Readings from Last 3 Encounters:   01/30/22 157 lb 3 oz (71.3 kg)   03/14/17 200 lb (90.7 kg)   02/19/16 200 lb 6.4 oz (90.9 kg)               ASSESMENT  Ac resp failure  covid pneumonia  Bact pneumonia        PLAN  1. On decadron plud baricitinab  2. o2 adm  3.  On levaquin    1/31/2022  Marcus Delacruz MD, M.D.

## 2022-01-31 NOTE — PROGRESS NOTES
Check TSH  Check echo   Increase lopressor to 50mg bid for now  ST and junctional tachycardia  covid  No PE noted on V/Q  Na elevated  Acute on chronic renal injury    Echo -- Summary-1/22   Technically difficult examination due to uncooperative patient. Left ventricular systolic function is hyperdynamic. Ejection fraction is visually estimated >60%. No significant valvular disease noted. No evidence of any pericardial effusion. Daily Progress Note    Pt. Awake, alert and feeling ok  HR stable, NSR at 90 this am, BP stable  No CP, SOB stable     Covid PNA    On 6 L NC today    On ABx    Tx. Per pulm and primary     Tachycardia    Idioventricular rhythm noted last night    Will increase dose of lopressor today    Echo to be done today     On IVF for hypernatremia  Will follow     PAST MEDICAL HISTORY:  Diabetes, dysphagia, dysphonia, hyperlipidemia,  hypertension, kidney failure, and osteoarthritis.     PAST SURGICAL HISTORY:  Cervical spine surgery, endoscopy, and  colonoscopy in the past.     ALLERGIES:  No known drug allergies.     FAMILY HISTORY:  Reviewed and noncontributory.     SOCIAL HISTORY:  Former smoker, quit in 2010. Randee Alarcon does not drink any  alcohol.     HOME MEDICATIONS:  Tegretol, glipizide, Lipitor 80 mg every night at  bedtime, lisinopril 10 mg daily, Neurontin, metformin, and OxyContin.       Objective:   BP (!) 145/95   Pulse 95   Temp 98.3 °F (36.8 °C) (Axillary)   Resp 19   Ht 6' (1.829 m)   Wt 157 lb 3 oz (71.3 kg)   SpO2 93%   BMI 21.32 kg/m²   No intake or output data in the 24 hours ending 01/31/22 0842    Medications:   Scheduled Meds:   insulin glargine  15 Units SubCUTAneous Nightly    metoprolol tartrate  50 mg Oral BID    insulin lispro  0-12 Units SubCUTAneous TID     insulin lispro  0-12 Units SubCUTAneous 2 times per day    cefTRIAXone (ROCEPHIN) IV  1,000 mg IntraVENous Daily    baricitinib  1 mg Oral Daily    sodium chloride flush  5-40 mL IntraVENous 2 times per day    dexamethasone  6 mg IntraVENous Q24H    Vitamin D  2,000 Units Oral Daily    heparin (porcine)  5,000 Units SubCUTAneous 3 times per day    aspirin  81 mg Oral Daily    famotidine  10 mg Oral Q48H    levofloxacin  750 mg IntraVENous Q48H      Infusions:   dextrose 75 mL/hr at 01/30/22 2139      PRN Meds:  sodium chloride flush, ondansetron **OR** ondansetron, polyethylene glycol, acetaminophen **OR** acetaminophen       Physical Exam:  Vitals:    01/31/22 0830   BP: (!) 145/95   Pulse: 95   Resp: 19   Temp: 98.3 °F (36.8 °C)   SpO2: 93%        General: AAO, NAD  Chest: Nontender  Cardiac: First and Second Heart Sounds are Normal, No Murmurs or Gallops noted  Lungs:Clear to auscultation and percussion. Abdomen: Soft, NT, ND, +BS  Extremities: No clubbing, no edema  Vascular:  Equal 2+ peripheral pulses. Lab Data:  CBC:   Recent Labs     01/28/22  1426 01/29/22  0539 01/30/22  1416   WBC 5.4 5.2 5.8   HGB 14.2 13.0* 14.6   HCT 44.1 39.5* 46.2   MCV 93.4 94.0 95.1    156 168     BMP:   Recent Labs     01/28/22  1426 01/29/22  0539 01/30/22  1416   * 152* 155*   K 3.5 3.6 3.6   * 114* 115*   CO2 21 20* 20*   PHOS  --  2.6 3.1   BUN 97* 96* 79*   CREATININE 3.1* 2.5* 2.3*     LIVER PROFILE:   Recent Labs     01/28/22  1426 01/29/22  0539 01/30/22  1416   AST 32  --  74*   ALT 20  --  48*   LIPASE 72* 55  --    BILIDIR  --   --  0.2   BILITOT 0.7  --  0.7   ALKPHOS 72  --  84     PT/INR:   Recent Labs     01/30/22  1416   PROTIME 44.1*   INR 3.38     APTT:   Recent Labs     01/30/22  1416   APTT 37.3*     BNP:  No results for input(s): BNP in the last 72 hours.       Assessment:  Patient Active Problem List    Diagnosis Date Noted    Acute kidney injury ([facility] [address] 01/28/2022       Electronically signed by Eulalia Mcdaniels PA-C on 1/31/2022 at 8:42 AM    Electronically signed by Maddie King MD on 1/31/22 at 4:00 PM EST

## 2022-01-31 NOTE — PROGRESS NOTES
V2.0  [facility] Hospitalist Progress Note      Name:  Mariela Aguilar /Age/Sex: 1945  (64 y.o. male)   MRN & CSN:  [id number] & [id number] Encounter Date/Time: 2022 7:42 PM EST    Location:  [address] [place] [address] PCP: Junnie Hashimoto, MD       Hospital Day: 4    Assessment and Plan:   Mariela Aguilar is a 64 y.o. male     Hospital day 3-sodium was 80 yesterday, pending today  -On D5W at 75 mL/h  -Need to clarify with patient how much oxycodone he takes every day. Per PDMP report he fills #180 of oxycodone 20 mg every month. For now ordered oxycodone 5 mg 3 times a day, but may need to increase the dose if he takes more than that to prevent withdrawal which could further complicate his hospital course and prolong his length of stay. 1. Acute kidney injury (Cr 3.1, BUN 97)  2. COVID 19 PNA   3. Acute respiratory failure with hypoxia due to above-on 6 L  4. Hypernatremia MAX Na 155 -appreciate nephrology consult  5. Metabolic encephalopathy  6. D dimer elevated in setting of MARCO  7. T2 DM  8. DVT prophylaxis        Jolie Montoya is a 64 y.o.  male  who presents with Acute kidney injury [facility])     Admission labs:  procal .409,  D dimer 515    Baricitinib from  until     Oxygen: Requiring 6 L, has been stable for several days    Dexamethasone from  until      Elevated D-dimer: VQ scan on  is low probability    DVT prophylaxis: Heparin 5000 units subcu 3 times a day. Will change to Lovenox now that creatinine clearance has improved    Volume status: Monitor      Procalcitonin: 0.409 upon admission    Antibacterial antibiotics. --has had the equivalent of 5 days of Levaquin 750 mg daily, will discontinue it given his encephalopathy  -On a course of Rocephin from  until February for  -Please note that antibacterial antibiotics are empiric. The elevated procalcitonin could be due to renal failure.     Fluids: D5w AT 75    Electrolytes: Sodium level was 151 on arrival, 152 on hospital day 2, and is 155 on hospital day 3, nephrology managing    Metabolic encephalopathy  Continued to worsen for several days after admission  Started turning around on January 31 with correction of sodium    Renal failure-creatinine 3.1 on admission, improved to 2.3 on January 30    Chronic pain syndrome  -January 31-restart Neurontin in a.m.-doses apparently 400 mg 3 times daily. Cannot rule out withdrawal from it    Chronic oxycodone use  -Per PDMP report he fills a high amount of oxycodone every month-#180 of oxycodone 20 mg immediate release every month-cannot rule out withdrawal from it given his encephalopathy  -January 31-we will order oxycodone 5 mg 3 times a day for the morning, and need to clarify with patient how much oxycodone he actually takes every day    On Tegretol for neuropathy  -January 31-we will restart it for the morning      UA: With trace white cells and 22 red cells-we will repeat UA as outpatient    Lower extremity ultrasound: No definite DVT, suboptimal study         Subjective:     Chief Complaint: Fatigue       Twyla Gonzalez is a 64 y.o. male       January 31    He correctly stated his age of 64 today. This is a great improvement over yesterday    He is still disoriented. When I asked him why he was, his initial response was in the basement. I then reminded him that he was in the hospital, and he seemed to understand that. I asked him the year, and he said it was 65. I think he was trying to say 2021.  -Talking quite a bit more today  -He is on 6 L oxygen today      January 30    His nurse stated that he tried to get out of bed earlier today    When I asked him how old he was, he replied \"89\"    I updated his wife who is upstairs            January 29: When I walked in he stated that \"I am ready to rock 'n' roll\". However Jovan's \"it became evident that he is having difficulty with his speech.       Vaccine: Has not had 1      Review of Systems:        No vomiting or bleeding noted     Objective: Intake/Output Summary (Last 24 hours) at 1/31/2022 1444  Last data filed at 1/31/2022 0920  Gross per 24 hour   Intake 200 ml   Output 250 ml   Net -50 ml        Vitals:   Vitals:    01/31/22 1400   BP: 131/80   Pulse:    Resp: 20   Temp: 98 °F (36.7 °C)   SpO2:        Physical Exam:     Physical Exam  Constitutional:       Appearance: He is ill-appearing. HENT:      Nose: No rhinorrhea. Eyes:      General:         Right eye: No discharge. Left eye: No discharge. Pulmonary:      Effort: Pulmonary effort is normal.   Musculoskeletal:         General: No deformity.    Psychiatric:      Comments: He has some confusion       Medications:   Medications:    insulin glargine  15 Units SubCUTAneous Nightly    metoprolol tartrate  50 mg Oral BID    insulin lispro  0-12 Units SubCUTAneous TID WC    insulin lispro  0-12 Units SubCUTAneous 2 times per day    cefTRIAXone (ROCEPHIN) IV  1,000 mg IntraVENous Daily    baricitinib  1 mg Oral Daily    sodium chloride flush  5-40 mL IntraVENous 2 times per day    dexamethasone  6 mg IntraVENous Q24H    Vitamin D  2,000 Units Oral Daily    heparin (porcine)  5,000 Units SubCUTAneous 3 times per day    aspirin  81 mg Oral Daily    famotidine  10 mg Oral Q48H    levofloxacin  750 mg IntraVENous Q48H      Infusions:    dextrose 75 mL/hr at 01/31/22 0858     PRN Meds: sodium chloride flush, 5-40 mL, PRN  ondansetron, 4 mg, Q8H PRN   Or  ondansetron, 4 mg, Q6H PRN  polyethylene glycol, 17 g, Daily PRN  acetaminophen, 650 mg, Q6H PRN   Or  acetaminophen, 650 mg, Q6H PRN        Labs      Recent Results (from the past 24 hour(s))   POCT Glucose    Collection Time: 01/30/22  5:05 PM   Result Value Ref Range    POC Glucose 204 (H) 70 - 99 MG/DL   POCT Glucose    Collection Time: 01/30/22  9:30 PM   Result Value Ref Range    POC Glucose 213 (H) 70 - 99 MG/DL   POCT Glucose    Collection Time: 01/31/22  2:56 AM   Result Value Ref Range    POC Glucose 165 (H) 70 - 99 MG/DL   POCT Glucose    Collection Time: 01/31/22  7:44 AM   Result Value Ref Range    POC Glucose 128 (H) 70 - 99 MG/DL   Urinalysis    Collection Time: 01/31/22  9:15 AM   Result Value Ref Range    Color, UA YELLOW YELLOW    Clarity, UA CLEAR CLEAR    Glucose, Urine 100 (A) NEGATIVE MG/DL    Bilirubin Urine NEGATIVE NEGATIVE MG/DL    Ketones, Urine NEGATIVE NEGATIVE MG/DL    Specific Gravity, UA 1.025 1.001 - 1.035    Blood, Urine LARGE (A) NEGATIVE    pH, Urine 5.5 5.0 - 8.0    Protein, UA 30 (A) NEGATIVE MG/DL    Urobilinogen, Urine NORMAL 0.2 - 1.0 MG/DL    Nitrite Urine, Quantitative NEGATIVE NEGATIVE    Leukocyte Esterase, Urine NEGATIVE NEGATIVE    RBC, UA 22 (H) 0 - 3 /HPF    WBC, UA 3 (H) 0 - 2 /HPF    Bacteria, UA RARE (A) NEGATIVE /HPF    Mucus, UA RARE (A) NEGATIVE HPF    Hyaline Casts, UA 0 /LPF    Uric Acid Felisha, UA MODERATE /HPF   Electrolytes urine random    Collection Time: 01/31/22  9:15 AM   Result Value Ref Range    Sodium, Ur 46 35 - 167 MMOL/L    Potassium, Ur 43.7 22 - 119 MMOL/L    Chloride 30 (L) 43 - 210 MMOL/L   Protein / creatinine ratio, urine    Collection Time: 01/31/22  9:15 AM   Result Value Ref Range    Urine Total Protein 74.3 (H) <12 MG/DL    Creatinine, Ur 82.4 39 - 259 MG/DL    Prot/Creat Ratio, Ur 0.9 (H) <0.2   Legionella antigen, urine    Collection Time: 01/31/22  9:15 AM    Specimen: Urine   Result Value Ref Range    Legionella Urinary Ag NEGATIVE NEGATIVE   Strep Pneumoniae Antigen    Collection Time: 01/31/22  9:15 AM    Specimen: CSF   Result Value Ref Range    Strep pneumo Ag URINE NEGATIVE    POCT Glucose    Collection Time: 01/31/22 12:15 PM   Result Value Ref Range    POC Glucose 237 (H) 70 - 99 MG/DL

## 2022-01-31 NOTE — PROGRESS NOTES
Notified by tele that pt frequently will flip into an accelerated junctional rhythm but not sustained. Pt is asymptomatic. Notified hospitalist. Labs ordered to check potassium, phos and mag. Phlebotomist having a difficult time getting blood.

## 2022-01-31 NOTE — CARE COORDINATION
Chart reviewed. Patient remains on the isolation floor on 6 liters of oxygen. Cm attempted to call the patient with no answer. The patient has insurance and a PCP. Cm attempted to call wife with no answer or ability to leave a VM. Cm will follow.

## 2022-01-31 NOTE — PROGRESS NOTES
Hospitalist    Hypernatremia worse today -went up to 155  Mental status worse today    Change to half-normal saline to D5W at 75

## 2022-02-01 ENCOUNTER — APPOINTMENT (OUTPATIENT)
Dept: GENERAL RADIOLOGY | Age: 77
DRG: 177 | End: 2022-02-01
Payer: MEDICARE

## 2022-02-01 LAB
ALBUMIN SERPL-MCNC: 2.8 GM/DL (ref 3.4–5)
ALBUMIN SERPL-MCNC: 2.8 GM/DL (ref 3.4–5)
ALP BLD-CCNC: 76 IU/L (ref 40–129)
ALT SERPL-CCNC: 26 U/L (ref 10–40)
ANION GAP SERPL CALCULATED.3IONS-SCNC: 18 MMOL/L (ref 4–16)
ANION GAP SERPL CALCULATED.3IONS-SCNC: 19 MMOL/L (ref 4–16)
APTT: 59.6 SECONDS (ref 25.1–37.1)
AST SERPL-CCNC: 28 IU/L (ref 15–37)
BASOPHILS ABSOLUTE: 0 K/CU MM
BASOPHILS RELATIVE PERCENT: 0.1 % (ref 0–1)
BILIRUB SERPL-MCNC: 0.5 MG/DL (ref 0–1)
BILIRUBIN DIRECT: 0.2 MG/DL (ref 0–0.3)
BILIRUBIN, INDIRECT: 0.3 MG/DL (ref 0–0.7)
BUN BLDV-MCNC: 71 MG/DL (ref 6–23)
BUN BLDV-MCNC: 73 MG/DL (ref 6–23)
CALCIUM SERPL-MCNC: 8.5 MG/DL (ref 8.3–10.6)
CALCIUM SERPL-MCNC: 8.5 MG/DL (ref 8.3–10.6)
CHLORIDE BLD-SCNC: 115 MMOL/L (ref 99–110)
CHLORIDE BLD-SCNC: 116 MMOL/L (ref 99–110)
CO2: 15 MMOL/L (ref 21–32)
CO2: 17 MMOL/L (ref 21–32)
CREAT SERPL-MCNC: 2 MG/DL (ref 0.9–1.3)
CREAT SERPL-MCNC: 2.1 MG/DL (ref 0.9–1.3)
CULTURE: NORMAL
DIFFERENTIAL TYPE: ABNORMAL
EOSINOPHILS ABSOLUTE: 0 K/CU MM
EOSINOPHILS RELATIVE PERCENT: 0 % (ref 0–3)
GFR AFRICAN AMERICAN: 37 ML/MIN/1.73M2
GFR AFRICAN AMERICAN: 39 ML/MIN/1.73M2
GFR NON-AFRICAN AMERICAN: 31 ML/MIN/1.73M2
GFR NON-AFRICAN AMERICAN: 32 ML/MIN/1.73M2
GLUCOSE BLD-MCNC: 130 MG/DL (ref 70–99)
GLUCOSE BLD-MCNC: 139 MG/DL (ref 70–99)
GLUCOSE BLD-MCNC: 206 MG/DL (ref 70–99)
GLUCOSE BLD-MCNC: 235 MG/DL (ref 70–99)
GLUCOSE BLD-MCNC: 241 MG/DL (ref 70–99)
GLUCOSE BLD-MCNC: 88 MG/DL (ref 70–99)
GLUCOSE BLD-MCNC: 96 MG/DL (ref 70–99)
HCT VFR BLD CALC: 41.5 % (ref 42–52)
HEMOGLOBIN: 13.7 GM/DL (ref 13.5–18)
HIGH SENSITIVE C-REACTIVE PROTEIN: 46.3 MG/L
IMMATURE NEUTROPHIL %: 1.5 % (ref 0–0.43)
INR BLD: 5.1 INDEX
LYMPHOCYTES ABSOLUTE: 0.8 K/CU MM
LYMPHOCYTES RELATIVE PERCENT: 7.4 % (ref 24–44)
Lab: NORMAL
MAGNESIUM: 2.3 MG/DL (ref 1.8–2.4)
MCH RBC QN AUTO: 30.1 PG (ref 27–31)
MCHC RBC AUTO-ENTMCNC: 33 % (ref 32–36)
MCV RBC AUTO: 91.2 FL (ref 78–100)
MONOCYTES ABSOLUTE: 0.2 K/CU MM
MONOCYTES RELATIVE PERCENT: 2 % (ref 0–4)
NUCLEATED RBC %: 0 %
PDW BLD-RTO: 14 % (ref 11.7–14.9)
PHOSPHORUS: 3.1 MG/DL (ref 2.5–4.9)
PHOSPHORUS: 3.2 MG/DL (ref 2.5–4.9)
PLATELET # BLD: 147 K/CU MM (ref 140–440)
PMV BLD AUTO: 10.6 FL (ref 7.5–11.1)
POTASSIUM SERPL-SCNC: 3.5 MMOL/L (ref 3.5–5.1)
POTASSIUM SERPL-SCNC: 3.5 MMOL/L (ref 3.5–5.1)
PROCALCITONIN: 0.18
PROCALCITONIN: 0.19
PROTHROMBIN TIME: 66.9 SECONDS (ref 11.7–14.5)
RBC # BLD: 4.55 M/CU MM (ref 4.6–6.2)
SEGMENTED NEUTROPHILS ABSOLUTE COUNT: 9 K/CU MM
SEGMENTED NEUTROPHILS RELATIVE PERCENT: 89 % (ref 36–66)
SODIUM BLD-SCNC: 149 MMOL/L (ref 135–145)
SODIUM BLD-SCNC: 151 MMOL/L (ref 135–145)
SPECIMEN: NORMAL
T4 FREE: 1.28 NG/DL (ref 0.9–1.8)
TOTAL IMMATURE NEUTOROPHIL: 0.15 K/CU MM
TOTAL NUCLEATED RBC: 0 K/CU MM
TOTAL PROTEIN: 5.6 GM/DL (ref 6.4–8.2)
TSH HIGH SENSITIVITY: 0.34 UIU/ML (ref 0.27–4.2)
WBC # BLD: 10.1 K/CU MM (ref 4–10.5)

## 2022-02-01 PROCEDURE — 83735 ASSAY OF MAGNESIUM: CPT

## 2022-02-01 PROCEDURE — 84145 PROCALCITONIN (PCT): CPT

## 2022-02-01 PROCEDURE — 97167 OT EVAL HIGH COMPLEX 60 MIN: CPT

## 2022-02-01 PROCEDURE — 71045 X-RAY EXAM CHEST 1 VIEW: CPT

## 2022-02-01 PROCEDURE — 6370000000 HC RX 637 (ALT 250 FOR IP): Performed by: INTERNAL MEDICINE

## 2022-02-01 PROCEDURE — 6360000002 HC RX W HCPCS: Performed by: HOSPITALIST

## 2022-02-01 PROCEDURE — 2580000003 HC RX 258: Performed by: FAMILY MEDICINE

## 2022-02-01 PROCEDURE — 6370000000 HC RX 637 (ALT 250 FOR IP): Performed by: HOSPITALIST

## 2022-02-01 PROCEDURE — 36415 COLL VENOUS BLD VENIPUNCTURE: CPT

## 2022-02-01 PROCEDURE — 82248 BILIRUBIN DIRECT: CPT

## 2022-02-01 PROCEDURE — 6360000002 HC RX W HCPCS: Performed by: INTERNAL MEDICINE

## 2022-02-01 PROCEDURE — 97530 THERAPEUTIC ACTIVITIES: CPT

## 2022-02-01 PROCEDURE — 97162 PT EVAL MOD COMPLEX 30 MIN: CPT

## 2022-02-01 PROCEDURE — 6360000002 HC RX W HCPCS: Performed by: FAMILY MEDICINE

## 2022-02-01 PROCEDURE — 6370000000 HC RX 637 (ALT 250 FOR IP): Performed by: FAMILY MEDICINE

## 2022-02-01 PROCEDURE — 86141 C-REACTIVE PROTEIN HS: CPT

## 2022-02-01 PROCEDURE — 84439 ASSAY OF FREE THYROXINE: CPT

## 2022-02-01 PROCEDURE — 2700000000 HC OXYGEN THERAPY PER DAY

## 2022-02-01 PROCEDURE — 85610 PROTHROMBIN TIME: CPT

## 2022-02-01 PROCEDURE — 2580000003 HC RX 258: Performed by: INTERNAL MEDICINE

## 2022-02-01 PROCEDURE — 84100 ASSAY OF PHOSPHORUS: CPT

## 2022-02-01 PROCEDURE — 6360000002 HC RX W HCPCS: Performed by: NURSE PRACTITIONER

## 2022-02-01 PROCEDURE — 84443 ASSAY THYROID STIM HORMONE: CPT

## 2022-02-01 PROCEDURE — 2580000003 HC RX 258

## 2022-02-01 PROCEDURE — 85730 THROMBOPLASTIN TIME PARTIAL: CPT

## 2022-02-01 PROCEDURE — 94761 N-INVAS EAR/PLS OXIMETRY MLT: CPT

## 2022-02-01 PROCEDURE — 80053 COMPREHEN METABOLIC PANEL: CPT

## 2022-02-01 PROCEDURE — 82962 GLUCOSE BLOOD TEST: CPT

## 2022-02-01 PROCEDURE — 1200000000 HC SEMI PRIVATE

## 2022-02-01 PROCEDURE — 85025 COMPLETE CBC W/AUTO DIFF WBC: CPT

## 2022-02-01 RX ORDER — GABAPENTIN 300 MG/1
300 CAPSULE ORAL 3 TIMES DAILY
Status: DISCONTINUED | OUTPATIENT
Start: 2022-02-01 | End: 2022-02-19 | Stop reason: HOSPADM

## 2022-02-01 RX ORDER — PHYTONADIONE 5 MG/1
5 TABLET ORAL ONCE
Status: COMPLETED | OUTPATIENT
Start: 2022-02-01 | End: 2022-02-01

## 2022-02-01 RX ORDER — CARBAMAZEPINE 100 MG/1
100 TABLET, EXTENDED RELEASE ORAL 2 TIMES DAILY
Status: DISCONTINUED | OUTPATIENT
Start: 2022-02-01 | End: 2022-02-19 | Stop reason: HOSPADM

## 2022-02-01 RX ORDER — DEXAMETHASONE SODIUM PHOSPHATE 10 MG/ML
10 INJECTION, SOLUTION INTRAMUSCULAR; INTRAVENOUS EVERY 12 HOURS
Status: DISCONTINUED | OUTPATIENT
Start: 2022-02-01 | End: 2022-02-01 | Stop reason: CLARIF

## 2022-02-01 RX ORDER — OXYCODONE HYDROCHLORIDE 5 MG/1
5 TABLET ORAL 3 TIMES DAILY
Status: DISCONTINUED | OUTPATIENT
Start: 2022-02-01 | End: 2022-02-05

## 2022-02-01 RX ORDER — DEXAMETHASONE SODIUM PHOSPHATE 10 MG/ML
10 INJECTION INTRAMUSCULAR; INTRAVENOUS EVERY 12 HOURS
Status: DISCONTINUED | OUTPATIENT
Start: 2022-02-01 | End: 2022-02-08

## 2022-02-01 RX ORDER — ZIPRASIDONE MESYLATE 20 MG/ML
20 INJECTION, POWDER, LYOPHILIZED, FOR SOLUTION INTRAMUSCULAR ONCE
Status: COMPLETED | OUTPATIENT
Start: 2022-02-01 | End: 2022-02-01

## 2022-02-01 RX ADMIN — HEPARIN SODIUM 5000 UNITS: 5000 INJECTION, SOLUTION INTRAVENOUS; SUBCUTANEOUS at 06:55

## 2022-02-01 RX ADMIN — ENOXAPARIN SODIUM 30 MG: 30 INJECTION SUBCUTANEOUS at 12:23

## 2022-02-01 RX ADMIN — PHYTONADIONE 5 MG: 5 TABLET ORAL at 16:43

## 2022-02-01 RX ADMIN — GABAPENTIN 300 MG: 300 CAPSULE ORAL at 11:14

## 2022-02-01 RX ADMIN — ZIPRASIDONE MESYLATE 20 MG: 20 INJECTION, POWDER, LYOPHILIZED, FOR SOLUTION INTRAMUSCULAR at 01:35

## 2022-02-01 RX ADMIN — OXYCODONE HYDROCHLORIDE 5 MG: 5 TABLET ORAL at 11:14

## 2022-02-01 RX ADMIN — WATER 10 ML: 1 INJECTION INTRAMUSCULAR; INTRAVENOUS; SUBCUTANEOUS at 01:35

## 2022-02-01 RX ADMIN — SODIUM CHLORIDE, PRESERVATIVE FREE 10 ML: 5 INJECTION INTRAVENOUS at 11:14

## 2022-02-01 RX ADMIN — DEXAMETHASONE SODIUM PHOSPHATE 10 MG: 10 INJECTION INTRAMUSCULAR; INTRAVENOUS at 11:10

## 2022-02-01 RX ADMIN — ENOXAPARIN SODIUM 30 MG: 30 INJECTION SUBCUTANEOUS at 21:45

## 2022-02-01 RX ADMIN — DEXTROSE MONOHYDRATE: 50 INJECTION, SOLUTION INTRAVENOUS at 11:13

## 2022-02-01 RX ADMIN — CEFTRIAXONE SODIUM 1000 MG: 1 INJECTION, POWDER, FOR SOLUTION INTRAMUSCULAR; INTRAVENOUS at 11:17

## 2022-02-01 RX ADMIN — DEXAMETHASONE SODIUM PHOSPHATE 10 MG: 10 INJECTION INTRAMUSCULAR; INTRAVENOUS at 21:42

## 2022-02-01 RX ADMIN — ASPIRIN 81 MG CHEWABLE TABLET 81 MG: 81 TABLET CHEWABLE at 11:14

## 2022-02-01 RX ADMIN — CARBAMAZEPINE 100 MG: 100 TABLET, EXTENDED RELEASE ORAL at 11:13

## 2022-02-01 RX ADMIN — METOPROLOL TARTRATE 50 MG: 50 TABLET, FILM COATED ORAL at 11:14

## 2022-02-01 RX ADMIN — METOPROLOL TARTRATE 50 MG: 50 TABLET, FILM COATED ORAL at 21:44

## 2022-02-01 RX ADMIN — CARBAMAZEPINE 100 MG: 100 TABLET, EXTENDED RELEASE ORAL at 21:44

## 2022-02-01 ASSESSMENT — PAIN SCALES - GENERAL
PAINLEVEL_OUTOF10: 0

## 2022-02-01 NOTE — PROGRESS NOTES
Pt assisted to [facility] while sitting he states he feels like he will pass out. Assisted back to bed with 2 staff. Pt had been incontinent on his bed so we were trying to change his bed and pt was very resistant with our attempts to change his bed and get him a clean gown. He was pulling away and removing his oxygen. States he can't get his breath. Finally we were able to get him changed and O2 on at 10 l/min for him to recover.

## 2022-02-01 NOTE — PROGRESS NOTES
Progress Note( Dr. Alen Weiss)  1/31/2022  Subjective:   Admit Date: 1/28/2022  PCP: Skip Costa MD    Admitted For : altered mental state with generalized weakness positive for Covid pneumonia    Consulted For: Better contr ol of blood glucose    Interval History: Feels somewhat better  Has hypernatremia    Denies any chest pains,   Yes SOB . On  oxygen  Denies nausea or vomiting. No new bowel or bladder symptoms. Intake/Output Summary (Last 24 hours) at 1/31/2022 2207  Last data filed at 1/31/2022 0920  Gross per 24 hour   Intake 200 ml   Output 250 ml   Net -50 ml       DATA    CBC:   Recent Labs     01/29/22  0539 01/30/22  1416   WBC 5.2 5.8   HGB 13.0* 14.6    168    CMP:  Recent Labs     01/29/22  0539 01/30/22  1416   * 155*   K 3.6 3.6   * 115*   CO2 20* 20*   BUN 96* 79*   CREATININE 2.5* 2.3*   CALCIUM 8.7 9.3   PROT  --  7.3   LABALBU 3.2* 3.9  3.9   BILITOT  --  0.7   ALKPHOS  --  84   AST  --  74*   ALT  --  48*     Lipids: No results found for: CHOL, HDL, TRIG  Glucose:  Recent Labs     01/31/22  0744 01/31/22  1215 01/31/22  1656   POCGLU 128* 237* 112*     [id number]:  Lab Results   Component Value Date    LABA1C 6.0 01/29/2022     High Sensitivity TSH: No results found for: TSHHS  Free T3: No results found for: FT3  Free T4:No results found for: T4FREE    XR CHEST PORTABLE   Final Result   Mild bibasilar airspace disease, greater on the left, most likely pneumonia. VL DUP LOWER EXTREMITY VENOUS BILATERAL   Final Result   Left anterior tibial vein is not visualized and patient trying to get out of   bed during the exam.      No definite deep venous thrombosis is demonstrated. NM LUNG SCAN PERFUSION ONLY   Final Result   Low Probability for Pulmonary Embolus. CT HEAD WO CONTRAST   Final Result   1. No acute intracranial abnormality.    2. Diffuse parenchymal volume loss with mild-to-moderate chronic white matter   microvascular ischemic changes. 3. Chronic lacunar infarct in the right thalamus. XR CHEST PORTABLE   Final Result   Ill-defined left-greater-than-right bibasilar airspace disease most   consistent with pneumonia. Scheduled Medicines   Medications:    insulin glargine  15 Units SubCUTAneous Nightly    metoprolol tartrate  50 mg Oral BID    insulin lispro  0-12 Units SubCUTAneous TID WC    insulin lispro  0-12 Units SubCUTAneous 2 times per day    cefTRIAXone (ROCEPHIN) IV  1,000 mg IntraVENous Daily    baricitinib  1 mg Oral Daily    sodium chloride flush  5-40 mL IntraVENous 2 times per day    dexamethasone  6 mg IntraVENous Q24H    Vitamin D  2,000 Units Oral Daily    heparin (porcine)  5,000 Units SubCUTAneous 3 times per day    aspirin  81 mg Oral Daily    famotidine  10 mg Oral Q48H    levofloxacin  750 mg IntraVENous Q48H      Infusions:    dextrose 75 mL/hr at 01/31/22 2006         Objective:   Vitals: BP (!) 137/98   Pulse 94   Temp 98 °F (36.7 °C) (Axillary)   Resp 17   Ht 6' (1.829 m)   Wt 157 lb 3 oz (71.3 kg)   SpO2 95%   BMI 21.32 kg/m²   General appearance: alert and cooperative with exam  Neck: no JVD or bruit  Thyroid : Normal lobes   Lungs: Has Vesicular Breath sounds   Heart:  regular rate and rhythm  Abdomen: soft, non-tender; bowel sounds normal; no masses,  no organomegaly  Musculoskeletal: Normal  Extremities: extremities normal, , no edema  Neurologic:  Awake, alert, oriented to name, place and time. Cranial nerves II-XII are grossly intact. Motor is  intact. Sensory is intact. ,  and gait is normal.    Assessment:     Patient Active Problem List:     Acute kidney injury ([facility] [address]     Diabetes mellitus type II mild       Bilateral pneumonia possibly Covid related            Plan:     1. Reviewed POC blood glucose . Labs and X ray results   2. Reviewed Current Medicines   3. On  Correction bolus Humalog/ Basal Lantus Insulin regime   4.  Monitor Blood glucose frequently 5. Modified  the dose of Insulin/ other medicines as needed   6. Will follow     .      Magui Leslie MD, MD

## 2022-02-01 NOTE — PROGRESS NOTES
Comprehensive Nutrition Assessment    Type and Reason for Visit:  Initial    Nutrition Recommendations/Plan:   · Continue current diet  · Start standard supplements    Nutrition Assessment:  Pt assessed due to a low BMI. There is no weight history in the chart. Pt is consuming no intake in the context of Covid. Will order supplements and continue to follow. Malnutrition Assessment:  Malnutrition Status:  Insufficient data    Context:  Acute Illness       Estimated Daily Nutrient Needs:  Energy (kcal):  5235-8614; Weight Used for Energy Requirements:  Current     Protein (g):  80-90; Weight Used for Protein Requirements:  Ideal        Fluid (ml/day):  1500; Method Used for Fluid Requirements:  1 ml/kcal      Wounds:  None       Current Nutrition Therapies:    ADULT DIET;  Regular; 4 carb choices (60 gm/meal)    Anthropometric Measures:  · Height: 6' (182.9 cm)  · Current Body Weight: 157 lb (71.2 kg)   · Admission Body Weight: 157 lb (71.2 kg) (ADELE)    · Usual Body Weight:  (ADELE)     · Ideal Body Weight: 178 lbs; % Ideal Body Weight 88.2 %   · BMI: 21.3  · BMI Categories: Underweight (BMI less than 22) age over 72       Nutrition Diagnosis:   · Inadequate oral intake related to acute injury/trauma as evidenced by intake 0-25%      Nutrition Interventions:   Food and/or Nutrient Delivery:  Continue Current Diet,Start Oral Nutrition Supplement  Nutrition Education/Counseling:  No recommendation at this time   Coordination of Nutrition Care:  Continue to monitor while inpatient    Goals:  pt will consume greater than 75% of her meals and supplements       Nutrition Monitoring and Evaluation:   Behavioral-Environmental Outcomes:  None Identified   Food/Nutrient Intake Outcomes:  Food and Nutrient Intake,Supplement Intake  Physical Signs/Symptoms Outcomes:  Biochemical Data,Skin,Weight     Discharge Planning:    Continue Oral Nutrition Supplement,Continue current diet     Electronically signed by Live Donald

## 2022-02-01 NOTE — PROGRESS NOTES
Occupational Therapy    LTAC, located within [facility] ACUTE CARE OCCUPATIONAL THERAPY EVALUATION  Amauri Abbott, 1945, [id number], 2/1/2022    History  [place]:  The primary encounter diagnosis was COVID. Diagnoses of Hypoxia, Altered mental status, unspecified altered mental status type, Hypernatremia, Elevated serum creatinine, and Elevated lipase were also pertinent to this visit. Patient  has a past medical history of DM (diabetes mellitus) ([address] Dysphagia, Dysphonia, Hyperlipidemia, Hypertension, Kidney failure, and Osteoarthritis. Patient  has a past surgical history that includes Cervical spine surgery and Endoscopy, colon, diagnostic (2/19/2016). Subjective:  Patient states:  Pt agitated and fatigued. Pt nonverbal at this time    Pain:  No obvious pain symptoms. Communication with other providers:  Handoff to RN  Restrictions: Droplet Plus, General Precautions, Fall Risk    Home Setup/Prior level of function  Social/Functional History  Additional Comments: Pt nonverbal and unable to answer questions on this date. Examination of body systems (includes body structures/functions, activity/participation limitations):  · Observation:  Supine in bed upon arrival, agreeable to therapy  · Vision:  Difficulty keeping eyes open  · Hearing:  Appears WFL  · Cardiopulmonary:  On 02. Body Systems and functions:  · ROM R/L:  AAROM: ~90 degrees shoulder flexion before pt began to resist additional movement, distal WFL.     · Strength R/L:  4/5,   · Sensation: Difficult to assess due to cognition  · Tone: Appears WFL  · Coordination: max decreased BUE gross/fine motor coordination w/ hand over hand to coordinate  · Perception: max decreased initiation/sequencing w/ hand over hand to overcome    Activities of Daily Living (ADLs):  · Feeding: maxA   · Grooming: maxA (washing hands/face w/ warm washcloth)  · UB bathing: maxA  · LB bathing: maxA  · UB dressing: maxA (donning gown)  · LB dressing: maxA  · Toileting: maxA    Cognitive and Psychosocial Functioning:  · Overall cognitive status: Nonverbal, agitated, fatigued, hand over hand to initiate/sequence. Difficulty keeping eyes open  · Affect: Agitated/confused       Mobility:  · Supine to sit:  Dependent/total  · Transfers: DNT  · Sitting balance:  DNT. · Standing balance:  maxA. · Toilet/Shower Transfers: DNT  · Sit to supine: dependent/total  · Scooting to HOB: maxA             AM-PAC Daily Activity Inpatient   How much help for putting on and taking off regular lower body clothing?: Total  How much help for Bathing?: Total  How much help for Toileting?: Total  How much help for putting on and taking off regular upper body clothing?: Total  How much help for taking care of personal grooming?: Total  How much help for eating meals?: Total  AM-PAC Inpatient Daily Activity Raw Score: 6  AM-PAC Inpatient ADL T-Scale Score : 17.07  ADL Inpatient CMS 0-100% Score: 100  ADL Inpatient CMS G-Code Modifier : CN    Treatment:  Self Care Training:   Cues were given for safety, sequence, UE/LE placement, visual cues, and balance. Activities performed today included grooming, UB dressing    Safety: patient left in bed with bed alarm, call light within reach, RN notified, gait belt used. Assessment:  Pt is a 63 yo male admitted from home for COVID-19. Pt currently presents w/ deficits in ADL and high level IADL independence, functional activity tolerance, dynamic sitting and standing balance and tolerance and functional transfers, BUE strength/ROM, initiation/sequencing, BUE coordination, cognition, OOB activity.  Pt would benefit from continued acute care OT services w/ discharge to trial SNF  Complexity: High  Prognosis: Fair due to decreased cognition  Plan  Times per week: 1x+  Times per day: Daily  Current Treatment Recommendations: Caye Cue Re-education,Patient/Caregiver Education & Training,Cognitive Reorientation,Equipment Evaluation, Education, & procurement,Self-Care / [place] Anu Management Training,Cognitive/Perceptual Training,Functional Mobility Training,Safety Education & Training,Positioning     Equipment: defer    Goals:  Pt goal: go home  Time Frame for STGs: discharge  Goal 1: Pt will perform UE ADLs [place]  Goal 2: Pt will perform LE ADLs modA  Goal 3: Pt will perform toileting modA  Goal 4: Pt will perform functional transfer w/ AD modA  Goal 5: Pt will perform functional mobility w/ AD modA  Goal 6: Pt will perform therex/theract in order to increase functional activity tolerance and dynamic standing balance  Goal 7: Pt will perform all aspects of bed mobility [place]    Treatment plan:  Pt will perform all aspects of bed mobility [place]    Recommendations for NURSING activity: Alejandra EOB     Time:   Time in: 1254  Time out: 1304  Timed treatment minutes: 0 minutes  Total time: 10 minutes    Electronically signed by:    Lizzeth LUNA [id number]  1:29 PM,2/1/2022

## 2022-02-01 NOTE — PROGRESS NOTES
Daily Progress Note     Pt. Awake, but drowsy today- apparently got geodon last night  HR stable, NSR at 90 this am, BP stable  No CP, SOB stable    Patient is confused  Received Geodon last night due to confusion  Remain in sinus  Cr is improving  covid pneumonia   ST and junctional tachycardia keep on lopressor for now  V/Q negative for PE  Preserved EF        Covid PNA    On 6 L NC today    On ABx    Tx. Per pulm and primary     Tachycardia    Idioventricular rhythm noted on tele prior    Increased lopressor dose and appears rate is better today    Echo to be done today     On IVF for hypernatremia  Will follow    Echo -- Summary-1/22   Technically difficult examination due to uncooperative patient.   Left ventricular systolic function is hyperdynamic.   Ejection fraction is visually estimated >60%.   No significant valvular disease noted.   No evidence of any pericardial effusion.     PAST MEDICAL HISTORY:  Diabetes, dysphagia, dysphonia, hyperlipidemia,  hypertension, kidney failure, and osteoarthritis.     PAST SURGICAL HISTORY:  Cervical spine surgery, endoscopy, and  colonoscopy in the past.     ALLERGIES:  No known drug allergies.     FAMILY HISTORY:  Reviewed and noncontributory.     SOCIAL HISTORY:  Former smoker, quit in 2010.  He does not drink any  alcohol.     HOME MEDICATIONS:  Tegretol, glipizide, Lipitor 80 mg every night at  bedtime, lisinopril 10 mg daily, Neurontin, metformin, and OxyContin.       Objective:   /81   Pulse 102   Temp 97.6 °F (36.4 °C) (Oral)   Resp 20   Ht 6' (1.829 m)   Wt 152 lb 5.4 oz (69.1 kg)   SpO2 95%   BMI 20.66 kg/m²     Intake/Output Summary (Last 24 hours) at 2/1/2022 1225  Last data filed at 2/1/2022 1114  Gross per 24 hour   Intake 10 ml   Output --   Net 10 ml       Medications:   Scheduled Meds:   gabapentin  300 mg Oral TID    enoxaparin  30 mg SubCUTAneous BID    carBAMazepine  100 mg Oral BID    oxyCODONE  5 mg Oral TID    dexamethasone  10 mg IntraVENous Q12H    [START ON 2/2/2022] baricitinib  2 mg Oral Daily    insulin glargine  15 Units SubCUTAneous Nightly    metoprolol tartrate  50 mg Oral BID    insulin lispro  0-12 Units SubCUTAneous TID     insulin lispro  0-12 Units SubCUTAneous 2 times per day    cefTRIAXone (ROCEPHIN) IV  1,000 mg IntraVENous Daily    sodium chloride flush  5-40 mL IntraVENous 2 times per day    Vitamin D  2,000 Units Oral Daily    aspirin  81 mg Oral Daily    famotidine  10 mg Oral Q48H      Infusions:   dextrose 75 mL/hr at 02/01/22 1113      PRN Meds:  sodium chloride flush, ondansetron **OR** ondansetron, polyethylene glycol, acetaminophen **OR** acetaminophen       Physical Exam:  Vitals:    02/01/22 1219   BP:    Pulse:    Resp: 20   Temp:    SpO2: 95%        General: awake but drowsy  Chest: Nontender  Cardiac: regular rate and rhythm  Lungs:Clear to auscultation and percussion. Abdomen: Soft, NT, ND, +BS  Extremities: no peripheral edema noted  Vascular:  Equal 2+ peripheral pulses. Lab Data:  CBC:   Recent Labs     01/30/22  1416   WBC 5.8   HGB 14.6   HCT 46.2   MCV 95.1        BMP:   Recent Labs     01/30/22  1416 01/31/22  2201   * 149*   K 3.6 3.5   * 116*   CO2 20* 15*   PHOS 3.1  --    BUN 79* 71*   CREATININE 2.3* 2.0*     LIVER PROFILE:   Recent Labs     01/30/22  1416   AST 74*   ALT 48*   BILIDIR 0.2   BILITOT 0.7   ALKPHOS 84     PT/INR:   Recent Labs     01/30/22  1416   PROTIME 44.1*   INR 3.38     APTT:   Recent Labs     01/30/22  1416   APTT 37.3*     BNP:  No results for input(s): BNP in the last 72 hours.       Assessment:  Patient Active Problem List    Diagnosis Date Noted    Acute kidney injury ([facility] [address] 01/28/2022       Les Bowles MD, MD 2/1/2022 12:25 PM

## 2022-02-01 NOTE — PROGRESS NOTES
pulmonary      SUBJECTIVE:  Remains on o2     OBJECTIVE    VITALS:  /82   Pulse 61   Temp 96.9 °F (36.1 °C) (Tympanic)   Resp 23   Ht 6' (1.829 m)   Wt 157 lb 3 oz (71.3 kg)   SpO2 95%   BMI 21.32 kg/m²   HEAD AND FACE EXAM:  No throat injection, no active exudate,no thrush  NECK EXAM;No JVD, no masses, symmetrical  CHEST EXAM; Expansion equal and symmetrical, no masses  LUNG EXAM; Good breath sounds bilaterally. There are expiratory wheezes both lungs, there are crackles at both lung bases  CARDIOVASCULAR EXAM: Positive S1 and S2, no S3 or S4, no clicks ,no murmurs  RIGHT AND LEFT LOWER EXTRIMITY EXAM: No edema, no swelling, no inflamation  CNS EXAM: Alert and oriented X3          LABS   Lab Results   Component Value Date    WBC 5.8 01/30/2022    HGB 14.6 01/30/2022    HCT 46.2 01/30/2022    MCV 95.1 01/30/2022     01/30/2022     Lab Results   Component Value Date    CREATININE 2.0 (H) 01/31/2022    BUN 71 (H) 01/31/2022     (H) 01/31/2022    K 3.5 01/31/2022     (H) 01/31/2022    CO2 15 (L) 01/31/2022     Lab Results   Component Value Date    INR 3.38 01/30/2022    PROTIME 44.1 (H) 01/30/2022          Lab Results   Component Value Date    PHOS 3.1 01/30/2022    PHOS 2.6 01/29/2022      No results for input(s): PH, PO2ART, ITY8CWM, HCO3, BEART, O2SAT in the last 72 hours.       Wt Readings from Last 3 Encounters:   01/30/22 157 lb 3 oz (71.3 kg)   03/14/17 200 lb (90.7 kg)   02/19/16 200 lb 6.4 oz (90.9 kg)               ASSESMENT  Ac resp failure  covid pneumonia  Bact superadded pneumonia        PLAN  On levaquin  Cont decadron  On baricitinab  Cont o2  2/1/2022  Yesenia Bacon MD, M.D.

## 2022-02-01 NOTE — CONSULTS
Rik, 1945, 3017/3017-A, 2/1/2022    History  [place]:  The primary encounter diagnosis was COVID. Diagnoses of Hypoxia, Altered mental status, unspecified altered mental status type, Hypernatremia, Elevated serum creatinine, and Elevated lipase were also pertinent to this visit. Patient  has a past medical history of DM (diabetes mellitus) ([address] Dysphagia, Dysphonia, Hyperlipidemia, Hypertension, Kidney failure, and Osteoarthritis. Patient  has a past surgical history that includes Cervical spine surgery and Endoscopy, colon, diagnostic (2/19/2016). Subjective:  Patient states:  Nonverbal.    Pain:  Not reported. Communication with other providers: RN, JAKE  Restrictions: General precautions, fall risk, droplet plus    Home Setup/Prior level of function  Social/Functional History  Additional Comments: Pt nonverbal and unable to answer questions on this date. Per charting, pt's from home w wife and was indep prior to admission. Examination of body systems (includes body structures/functions, activity/participation limitations):  · Observation:  Supine in bed upon arrival. Pt primarily nonverbal w occasional outbursts of aggravation. · Vision: [place]  · Hearing:  [place]  · Cardiopulmonary:  Pt on 3L O2 through [place]. Upon arrival, pt SpO2 at ~85%. RN notified and O2 elevated to 7L, SpO2 nereida steadily to ~90%. · Cognition: Unable to assess this date, see OT/SLP note for further evaluation. Musculoskeletal  · ROM R/L:  WFL BLEs. · Strength R/L: Not formally assessed. Impaired in function and endurance. Mobility/Treatment:  · Rolling L/R:  maxA to roll to L w elevated HOB and use of bed rail. · Supine<>sit:  maxA for LE management and trunk support. Elevated HOB and use of bed rail. · Transfers: NT  · Sitting balance:  maxA for sitting EOB ~1 minute.  Pt impulsively returned to supine before more activity could be attempted. · Standing balance:  NT.    · Gait: NT    [facility] 6 Clicks Inpatient Mobility:  AM-PAC Inpatient Mobility Raw Score : 9    Safety: patient left supine in bed, call light within reach, RN notified. Assessment:  Pt is a 64year old male who was admitted to [facility] on 1/28/2022 d/t confusion. Pt currently being treated for covid-19, acute respiratory failure w hypoxia, and MARCO. Unable to attain pt's baseline function on this date. Currently, pt presents with impaired strength, balance, cognition, and activity tolerance. They would benefit from continued acute care PT and further PT services in the SNF setting. Complexity: Moderate  Prognosis: Good, no significant barriers to participation at this time. Plan Times per week: 3+  Discharge Recommendations: Subacute/Skilled Nursing Facility  Equipment: Continue to assess at next level of care    Goals:  Short term goals  Time Frame for Short term goals: 1 week  Short term goal 1: Pt will perform bed mobility modA w elevated HOB and use of rail  Short term goal 2: Pt will perform STS to/from EOB, chair, and toilet maxA and LRAD  Short term goal 3: Pt will perform bed<>chair transfer maxA and LRAD       Treatment plan:  Bed mobility, transfers, balance, gait, TA, TX, stairs, wheelchair    Recommendations for NURSING mobility: 2 person for bed mobility. Pt unable to transfer w therapy, would recommend santo at this time.      Time:   Time in: 0945  Time out: 1006  Timed treatment minutes: 8  Total time: 21    Electronically signed by:    PHI Montilla  2/1/2022, 12:21 PM

## 2022-02-01 NOTE — PROGRESS NOTES
Patient has had two episodes of converting to an idioventricular rhythm so far this shift. Patient converts back to sinus rhythm within 30 seconds to 1 minute. Night shift physician aware. Cardio saw patient regarding this issue yesterday. Will pass along to day shift RN and enter this note to ensure cardio becomes aware of the additional episodes tonight as well.

## 2022-02-01 NOTE — PROGRESS NOTES
Hospitalist Progress Note      Name:  Lam Rosales /Age/Sex: 1945  (64 y.o. male)   MRN & CSN:  [id number] & [id number] Admission Date/Time: 2022  1:32 PM   Location:  8541/4667-O PCP: Chan Rene MD         Hospital Day: 5      Assessment and Plan:   Patient is 73-year-old male past medical history of chronic pain syndrome, chronic oxycodone use, diabetes, hypertension, hyperlipidemia who was admitted for confusion, generalized weakness. Patient was found to have COVID-19 pneumonia. Patient continues to require baricitinib, dexamethasone and antibiotics. Not medically stable for discharge. #.  Acute hypoxic respiratory failure due to viral pneumonia from COVID-19  #. Viral pneumonia from COVID-19  Patient requires 9 nasal cannula  Continue bronchodilators and wean of oxygen as tolerated  Increase dexamethasone to 20 mg a day  Continue baricitinib  Continue antibiotics    #. Encephalopathy, likely metabolic  #. Hyponatremia likely hypovolemic  #. Acute kidney injury likely due to ATN likely due to prerenal azotemia  Suspect metabolic encephalopathy: Due to combination of hyponatremia, COVID-19 pneumonia and hypoxia  Sodium level improving  Continue to reorient patient  Maintain normal wake sleep cycle  Nephrology following for hypernatremia, acute kidney injury. #.  Sinus tachycardia/junctional tachycardia  Cardiology following  Continue metoprolol    #. Generalized weakness and physical deconditioning  PT/OT  ? Rehab once medically stable for discharge    #. Other medical problem include hypertension, hyperlipidemia, diabetes mellitus, type with hyperglycemia  Continue current medications      DVT prophylaxis: Lovenox  CODE STATUS: Total support        Subjective. :     Patient was seen and examined today. He is requiring 90 to nasal cannula. Dexamethasone increased to 20 mg daily.   We will continue baricitinib and antibiotics per    Objective:   Vitals:   Vitals: 02/01/22 1104   BP:    Pulse: 102   Resp:    Temp:    SpO2:          Physical Exam:   GEN: appears disoriented. HEENT: Atraumatic, normocephalic, PERRLA, EOMI  NECK: Supple, no apparent thyromegaly or masses. RESP: Clear lungs to auscultation  CARDIO/VASC: Regular rate and rhythm, normal S1, S2, no murmurs  GI: Abdomen is soft, nontender, no significant organomegaly noted, bowel sounds present  MSK: No gross joint deformities.   Skin: No significant rashes, skin lesions noted  Neuro: AOx2, cranial nerves grossly intact,   PSYCH: Affect appropriate    Medications:   Medications:    gabapentin  300 mg Oral TID    enoxaparin  30 mg SubCUTAneous BID    carBAMazepine  100 mg Oral BID    oxyCODONE  5 mg Oral TID    dexamethasone  10 mg IntraVENous Q12H    [START ON 2/2/2022] baricitinib  2 mg Oral Daily    insulin glargine  15 Units SubCUTAneous Nightly    metoprolol tartrate  50 mg Oral BID    insulin lispro  0-12 Units SubCUTAneous TID WC    insulin lispro  0-12 Units SubCUTAneous 2 times per day    cefTRIAXone (ROCEPHIN) IV  1,000 mg IntraVENous Daily    sodium chloride flush  5-40 mL IntraVENous 2 times per day    Vitamin D  2,000 Units Oral Daily    aspirin  81 mg Oral Daily    famotidine  10 mg Oral Q48H      Infusions:    dextrose 75 mL/hr at 02/01/22 1113     PRN Meds: sodium chloride flush, 5-40 mL, PRN  ondansetron, 4 mg, Q8H PRN   Or  ondansetron, 4 mg, Q6H PRN  polyethylene glycol, 17 g, Daily PRN  acetaminophen, 650 mg, Q6H PRN   Or  acetaminophen, 650 mg, Q6H PRN          Electronically signed by Buck Chun MD on 2/1/2022 at 11:55 AM

## 2022-02-02 LAB
ALBUMIN SERPL-MCNC: 3.1 GM/DL (ref 3.4–5)
ALBUMIN SERPL-MCNC: 3.1 GM/DL (ref 3.4–5)
ALP BLD-CCNC: 79 IU/L (ref 40–129)
ALT SERPL-CCNC: 33 U/L (ref 10–40)
ANION GAP SERPL CALCULATED.3IONS-SCNC: 19 MMOL/L (ref 4–16)
AST SERPL-CCNC: 45 IU/L (ref 15–37)
BASOPHILS ABSOLUTE: 0 K/CU MM
BASOPHILS RELATIVE PERCENT: 0.2 % (ref 0–1)
BILIRUB SERPL-MCNC: 0.8 MG/DL (ref 0–1)
BILIRUBIN DIRECT: 0.2 MG/DL (ref 0–0.3)
BILIRUBIN, INDIRECT: 0.6 MG/DL (ref 0–0.7)
BUN BLDV-MCNC: 92 MG/DL (ref 6–23)
CALCIUM SERPL-MCNC: 8.6 MG/DL (ref 8.3–10.6)
CHLORIDE BLD-SCNC: 111 MMOL/L (ref 99–110)
CO2: 17 MMOL/L (ref 21–32)
CREAT SERPL-MCNC: 2.3 MG/DL (ref 0.9–1.3)
CULTURE: NORMAL
CULTURE: NORMAL
DIFFERENTIAL TYPE: ABNORMAL
EOSINOPHILS ABSOLUTE: 0 K/CU MM
EOSINOPHILS RELATIVE PERCENT: 0 % (ref 0–3)
GFR AFRICAN AMERICAN: 33 ML/MIN/1.73M2
GFR NON-AFRICAN AMERICAN: 28 ML/MIN/1.73M2
GLUCOSE BLD-MCNC: 178 MG/DL (ref 70–99)
GLUCOSE BLD-MCNC: 201 MG/DL (ref 70–99)
GLUCOSE BLD-MCNC: 204 MG/DL (ref 70–99)
GLUCOSE BLD-MCNC: 212 MG/DL (ref 70–99)
GLUCOSE BLD-MCNC: 241 MG/DL (ref 70–99)
GLUCOSE BLD-MCNC: 249 MG/DL (ref 70–99)
HCT VFR BLD CALC: 39.1 % (ref 42–52)
HEMOGLOBIN: 13.2 GM/DL (ref 13.5–18)
IMMATURE NEUTROPHIL %: 1.7 % (ref 0–0.43)
LYMPHOCYTES ABSOLUTE: 0.8 K/CU MM
LYMPHOCYTES RELATIVE PERCENT: 7.3 % (ref 24–44)
Lab: NORMAL
Lab: NORMAL
MAGNESIUM: 2.5 MG/DL (ref 1.8–2.4)
MCH RBC QN AUTO: 30.2 PG (ref 27–31)
MCHC RBC AUTO-ENTMCNC: 33.8 % (ref 32–36)
MCV RBC AUTO: 89.5 FL (ref 78–100)
MONOCYTES ABSOLUTE: 0.3 K/CU MM
MONOCYTES RELATIVE PERCENT: 2.5 % (ref 0–4)
NUCLEATED RBC %: 0 %
PDW BLD-RTO: 13.9 % (ref 11.7–14.9)
PHOSPHORUS: 4 MG/DL (ref 2.5–4.9)
PLATELET # BLD: 173 K/CU MM (ref 140–440)
PMV BLD AUTO: 11.3 FL (ref 7.5–11.1)
POTASSIUM SERPL-SCNC: 3.3 MMOL/L (ref 3.5–5.1)
RBC # BLD: 4.37 M/CU MM (ref 4.6–6.2)
SEGMENTED NEUTROPHILS ABSOLUTE COUNT: 10.1 K/CU MM
SEGMENTED NEUTROPHILS RELATIVE PERCENT: 88.3 % (ref 36–66)
SODIUM BLD-SCNC: 147 MMOL/L (ref 135–145)
SPECIMEN: NORMAL
SPECIMEN: NORMAL
TOTAL IMMATURE NEUTOROPHIL: 0.2 K/CU MM
TOTAL NUCLEATED RBC: 0 K/CU MM
TOTAL PROTEIN: 6.1 GM/DL (ref 6.4–8.2)
WBC # BLD: 11.5 K/CU MM (ref 4–10.5)

## 2022-02-02 PROCEDURE — 2700000000 HC OXYGEN THERAPY PER DAY

## 2022-02-02 PROCEDURE — 94761 N-INVAS EAR/PLS OXIMETRY MLT: CPT

## 2022-02-02 PROCEDURE — C9113 INJ PANTOPRAZOLE SODIUM, VIA: HCPCS | Performed by: HOSPITALIST

## 2022-02-02 PROCEDURE — 6370000000 HC RX 637 (ALT 250 FOR IP): Performed by: INTERNAL MEDICINE

## 2022-02-02 PROCEDURE — 80053 COMPREHEN METABOLIC PANEL: CPT

## 2022-02-02 PROCEDURE — 2580000003 HC RX 258: Performed by: HOSPITALIST

## 2022-02-02 PROCEDURE — 6370000000 HC RX 637 (ALT 250 FOR IP): Performed by: FAMILY MEDICINE

## 2022-02-02 PROCEDURE — 2580000003 HC RX 258: Performed by: INTERNAL MEDICINE

## 2022-02-02 PROCEDURE — 83735 ASSAY OF MAGNESIUM: CPT

## 2022-02-02 PROCEDURE — 1200000000 HC SEMI PRIVATE

## 2022-02-02 PROCEDURE — 82248 BILIRUBIN DIRECT: CPT

## 2022-02-02 PROCEDURE — 85025 COMPLETE CBC W/AUTO DIFF WBC: CPT

## 2022-02-02 PROCEDURE — 82962 GLUCOSE BLOOD TEST: CPT

## 2022-02-02 PROCEDURE — 92610 EVALUATE SWALLOWING FUNCTION: CPT | Performed by: SPEECH-LANGUAGE PATHOLOGIST

## 2022-02-02 PROCEDURE — 36415 COLL VENOUS BLD VENIPUNCTURE: CPT

## 2022-02-02 PROCEDURE — 85027 COMPLETE CBC AUTOMATED: CPT

## 2022-02-02 PROCEDURE — 6360000002 HC RX W HCPCS: Performed by: HOSPITALIST

## 2022-02-02 PROCEDURE — 2580000003 HC RX 258: Performed by: FAMILY MEDICINE

## 2022-02-02 PROCEDURE — 80048 BASIC METABOLIC PNL TOTAL CA: CPT

## 2022-02-02 PROCEDURE — 84100 ASSAY OF PHOSPHORUS: CPT

## 2022-02-02 RX ORDER — PANTOPRAZOLE SODIUM 40 MG/10ML
40 INJECTION, POWDER, LYOPHILIZED, FOR SOLUTION INTRAVENOUS 2 TIMES DAILY
Status: DISCONTINUED | OUTPATIENT
Start: 2022-02-02 | End: 2022-02-19 | Stop reason: HOSPADM

## 2022-02-02 RX ORDER — POTASSIUM CHLORIDE 7.45 MG/ML
10 INJECTION INTRAVENOUS
Status: COMPLETED | OUTPATIENT
Start: 2022-02-02 | End: 2022-02-02

## 2022-02-02 RX ADMIN — INSULIN GLARGINE 15 UNITS: 100 INJECTION, SOLUTION SUBCUTANEOUS at 21:09

## 2022-02-02 RX ADMIN — POTASSIUM CHLORIDE 10 MEQ: 7.46 INJECTION, SOLUTION INTRAVENOUS at 17:16

## 2022-02-02 RX ADMIN — ENOXAPARIN SODIUM 30 MG: 100 INJECTION SUBCUTANEOUS at 09:31

## 2022-02-02 RX ADMIN — SODIUM CHLORIDE, PRESERVATIVE FREE 10 ML: 5 INJECTION INTRAVENOUS at 20:58

## 2022-02-02 RX ADMIN — CARBAMAZEPINE 100 MG: 100 TABLET, EXTENDED RELEASE ORAL at 20:58

## 2022-02-02 RX ADMIN — CEFTRIAXONE SODIUM 2000 MG: 2 INJECTION, POWDER, FOR SOLUTION INTRAMUSCULAR; INTRAVENOUS at 11:27

## 2022-02-02 RX ADMIN — ASPIRIN 81 MG CHEWABLE TABLET 81 MG: 81 TABLET CHEWABLE at 14:52

## 2022-02-02 RX ADMIN — FAMOTIDINE 10 MG: 20 TABLET, FILM COATED ORAL at 14:52

## 2022-02-02 RX ADMIN — OXYCODONE HYDROCHLORIDE 5 MG: 5 TABLET ORAL at 20:57

## 2022-02-02 RX ADMIN — PANTOPRAZOLE SODIUM 40 MG: 40 INJECTION, POWDER, FOR SOLUTION INTRAVENOUS at 20:58

## 2022-02-02 RX ADMIN — METOPROLOL TARTRATE 50 MG: 50 TABLET, FILM COATED ORAL at 20:58

## 2022-02-02 RX ADMIN — PANTOPRAZOLE SODIUM 40 MG: 40 INJECTION, POWDER, FOR SOLUTION INTRAVENOUS at 11:28

## 2022-02-02 RX ADMIN — GABAPENTIN 300 MG: 300 CAPSULE ORAL at 20:58

## 2022-02-02 RX ADMIN — POTASSIUM CHLORIDE 10 MEQ: 7.46 INJECTION, SOLUTION INTRAVENOUS at 19:35

## 2022-02-02 RX ADMIN — POTASSIUM CHLORIDE 10 MEQ: 7.46 INJECTION, SOLUTION INTRAVENOUS at 16:16

## 2022-02-02 RX ADMIN — DEXAMETHASONE SODIUM PHOSPHATE 10 MG: 10 INJECTION INTRAMUSCULAR; INTRAVENOUS at 09:32

## 2022-02-02 RX ADMIN — BARICITINIB 2 MG: 2 TABLET, FILM COATED ORAL at 14:52

## 2022-02-02 RX ADMIN — CARBAMAZEPINE 100 MG: 100 TABLET, EXTENDED RELEASE ORAL at 14:52

## 2022-02-02 RX ADMIN — DEXAMETHASONE SODIUM PHOSPHATE 10 MG: 10 INJECTION INTRAMUSCULAR; INTRAVENOUS at 20:57

## 2022-02-02 RX ADMIN — DEXTROSE MONOHYDRATE: 50 INJECTION, SOLUTION INTRAVENOUS at 06:48

## 2022-02-02 RX ADMIN — Medication 2000 UNITS: at 14:51

## 2022-02-02 RX ADMIN — POTASSIUM CHLORIDE 10 MEQ: 7.46 INJECTION, SOLUTION INTRAVENOUS at 18:30

## 2022-02-02 RX ADMIN — SODIUM CHLORIDE, PRESERVATIVE FREE 10 ML: 5 INJECTION INTRAVENOUS at 09:31

## 2022-02-02 RX ADMIN — METOPROLOL TARTRATE 50 MG: 50 TABLET, FILM COATED ORAL at 14:53

## 2022-02-02 RX ADMIN — AZITHROMYCIN MONOHYDRATE 500 MG: 500 INJECTION, POWDER, LYOPHILIZED, FOR SOLUTION INTRAVENOUS at 12:13

## 2022-02-02 ASSESSMENT — PAIN SCALES - GENERAL
PAINLEVEL_OUTOF10: 0
PAINLEVEL_OUTOF10: 0
PAINLEVEL_OUTOF10: 4

## 2022-02-02 NOTE — PROGRESS NOTES
Hospitalist Progress Note      Name:  Suly Lora /Age/Sex: 1945  (64 y.o. male)   MRN & CSN:  [id number] & [id number] Admission Date/Time: 2022  1:32 PM   Location:  [facility] PCP: Christina Decker MD         Hospital Day: 6      Assessment and Plan:     Patient is 68-year-old male past medical history of chronic pain syndrome, chronic oxycodone use, diabetes, hypertension, hyperlipidemia who was admitted for confusion, generalized weakness. Patient was found to have COVID-19 pneumonia. Patient continues to require baricitinib, dexamethasone and antibiotics. Not medically stable for discharge. #.  Acute hypoxic respiratory failure due to viral pneumonia from COVID-19  #. Viral pneumonia from COVID-19  Patient requires 10 nasal cannula  Continue bronchodilators and wean of oxygen as tolerated  Continue dexamethasone to 20 mg a day  Continue baricitinib  Continue antibiotics     #. Encephalopathy, likely metabolic  #. Hyponatremia likely hypovolemic  #. Acute kidney injury likely due to ATN likely due to prerenal azotemia  Suspect metabolic encephalopathy: Due to combination of hyponatremia, COVID-19 pneumonia and hypoxia  Sodium level improving  Continue to reorient patient  Maintain normal wake sleep cycle  Nephrology following for hypernatremia, acute kidney injury. #.  Sinus tachycardia/junctional tachycardia  Cardiology following  Continue metoprolol     #. Generalized weakness and physical deconditioning  PT/OT  ? Rehab once medically stable for discharge     #.   Other medical problem include hypertension, hyperlipidemia, diabetes mellitus, type with hyperglycemia  Continue current medications        DVT prophylaxis: Lovenox  CODE STATUS: Total support      Subjective. :         Objective:   Vitals:   Vitals:    22 0927   BP: 118/78   Pulse: 77   Resp: 11   Temp: 98.1 °F (36.7 °C)   SpO2: 93%         Physical Exam:   GEN: Awake, alert, in no apparent distress awake male, sitting upright in bed in no apparent distress. Appears given age. HEENT: Atraumatic, normocephalic, PERRLA, EOMI  NECK: Supple, no apparent thyromegaly or masses. RESP: Clear lungs to auscultation  CARDIO/VASC: Regular rate and rhythm, normal S1, S2, no murmurs  GI: Abdomen is soft, nontender, no significant organomegaly noted, bowel sounds present  MSK: No gross joint deformities.   Skin: No significant rashes, skin lesions noted  Neuro: AOx3, cranial nerves grossly intact, no focal motor or sensory deficits   PSYCH: Affect appropriate    Medications:   Medications:    cefTRIAXone (ROCEPHIN) IV  2,000 mg IntraVENous Daily    enoxaparin  30 mg SubCUTAneous Daily    azithromycin  500 mg IntraVENous Q24H    pantoprazole  40 mg IntraVENous BID    gabapentin  300 mg Oral TID    carBAMazepine  100 mg Oral BID    oxyCODONE  5 mg Oral TID    baricitinib  2 mg Oral Daily    dexamethasone  10 mg IntraVENous Q12H    insulin glargine  15 Units SubCUTAneous Nightly    metoprolol tartrate  50 mg Oral BID    insulin lispro  0-12 Units SubCUTAneous TID     insulin lispro  0-12 Units SubCUTAneous 2 times per day    sodium chloride flush  5-40 mL IntraVENous 2 times per day    Vitamin D  2,000 Units Oral Daily    aspirin  81 mg Oral Daily    famotidine  10 mg Oral Q48H      Infusions:    dextrose 75 mL/hr at 02/02/22 0648     PRN Meds: sodium chloride flush, 5-40 mL, PRN  ondansetron, 4 mg, Q8H PRN   Or  ondansetron, 4 mg, Q6H PRN  polyethylene glycol, 17 g, Daily PRN  acetaminophen, 650 mg, Q6H PRN   Or  acetaminophen, 650 mg, Q6H PRN          Electronically signed by Marshal Garsia MD on 2/2/2022 at 11:04 AM

## 2022-02-02 NOTE — PROGRESS NOTES
Nephrology Progress Note  2/1/2022 8:58 PM  Subjective: Interval History: Suly Lora is a 64 y.o. male weak and tired    Data:   Scheduled Meds:   gabapentin  300 mg Oral TID    enoxaparin  30 mg SubCUTAneous BID    carBAMazepine  100 mg Oral BID    oxyCODONE  5 mg Oral TID    dexamethasone  10 mg IntraVENous Q12H    [START ON 2/2/2022] baricitinib  2 mg Oral Daily    insulin glargine  15 Units SubCUTAneous Nightly    metoprolol tartrate  50 mg Oral BID    insulin lispro  0-12 Units SubCUTAneous TID WC    insulin lispro  0-12 Units SubCUTAneous 2 times per day    cefTRIAXone (ROCEPHIN) IV  1,000 mg IntraVENous Daily    sodium chloride flush  5-40 mL IntraVENous 2 times per day    Vitamin D  2,000 Units Oral Daily    aspirin  81 mg Oral Daily    famotidine  10 mg Oral Q48H     Continuous Infusions:   dextrose 75 mL/hr at 02/01/22 1113         CBC   Recent Labs     01/30/22  1416 02/01/22  1427   WBC 5.8 10.1   HGB 14.6 13.7   HCT 46.2 41.5*    147      BMP   Recent Labs     01/30/22  1416 01/31/22  2201 02/01/22  1427   * 149* 151*   K 3.6 3.5 3.5   * 116* 115*   CO2 20* 15* 17*   PHOS 3.1  --  3.1  3.2   BUN 79* 71* 73*   CREATININE 2.3* 2.0* 2.1*     Hepatic:   Recent Labs     01/30/22  1416 02/01/22  1427   AST 74* 28   ALT 48* 26   BILITOT 0.7 0.5   ALKPHOS 84 76     Troponin: No results for input(s): TROPONINI in the last 72 hours. BNP: No results for input(s): BNP in the last 72 hours. Lipids: No results for input(s): CHOL, HDL in the last 72 hours.     Invalid input(s): LDLCALCU  ABGs: No results found for: PHART, PO2ART, ZIQ1LJI  INR:   Recent Labs     01/30/22  1416 02/01/22  1427   INR 3.38 5.10*     Renal Labs  Albumin:    Lab Results   Component Value Date    LABALBU 2.8 02/01/2022    LABALBU 2.8 02/01/2022    LABALBU 100 05/30/2012     Calcium:    Lab Results   Component Value Date    CALCIUM 8.5 02/01/2022     Phosphorus:    Lab Results   Component Value Date    PHOS 3.1 02/01/2022    PHOS 3.2 02/01/2022     U/A:    Lab Results   Component Value Date    NITRU NEGATIVE 01/31/2022    COLORU YELLOW 01/31/2022    WBCUA 3 01/31/2022    RBCUA 22 01/31/2022    MUCUS RARE 01/31/2022    BACTERIA RARE 01/31/2022    CLARITYU CLEAR 01/31/2022    SPECGRAV 1.025 01/31/2022    UROBILINOGEN NORMAL 01/31/2022    BILIRUBINUR NEGATIVE 01/31/2022    BLOODU LARGE 01/31/2022    KETUA NEGATIVE 01/31/2022     ABG:  No results found for: PHART, WEM9SDQ, PO2ART, HBF5UIL, BEART, THGBART, SWD3RDC, Q1DYJAON  HgBA1c:    Lab Results   Component Value Date    LABA1C 6.0 01/29/2022     Microalbumen/Creatinine ratio:  No components found for: RUCREAT  TSH:  No results found for: TSH  IRON:  No results found for: IRON  Iron Saturation:  No components found for: PERCENTFE  TIBC:  No results found for: TIBC  FERRITIN:    Lab Results   Component Value Date    FERRITIN 2,498 01/28/2022     RPR:  No results found for: RPR  JEREMIE:  No results found for: ANATITER, JEREMIE  24 Hour Urine for Creatinine Clearance:  No components found for: CREAT4, UHRS10, UTV10      Objective:   I/O: 01/31 0701 - 02/01 0700  In: 200 [P.O.:200]  Out: 250 [Urine:250]  I/O last 3 completed shifts: In: 260 [P.O.:250; I.V.:10]  Out: 250 [Urine:250]  No intake/output data recorded. Vitals: /89   Pulse 80   Temp 97.7 °F (36.5 °C) (Axillary)   Resp 17   Ht 6' (1.829 m)   Wt 152 lb 5.4 oz (69.1 kg)   SpO2 93%   BMI 20.66 kg/m²  {  General appearance: awake weak  HEENT: Head: Normal, normocephalic, atraumatic.   Neck: supple, symmetrical, trachea midline  Lungs: diminished breath sounds bilaterally  Heart: S1, S2 normal  Abdomen: abnormal findings:  soft nt  Extremities: edema trace  Neurologic: Mental status: alertness: Awake        Assessment and Plan:      IMP:  #1 COVID-19 pneumonia  #2 hypernatremia  #3 acute renal failure from ATN on CKD 3 creatinine 1.6  #4 type 2 diabetes  #5 hypertension  #6 weakness with some mild confusion    Plan     1 treating covid  2 na monitor with hydraton  3 creat 2.1 stable  4 ssi  5 bp stable  6 affect monitor weak           Otse[place] MD Gualberto, MD

## 2022-02-02 NOTE — PROGRESS NOTES
Nephrology Progress Note  2/2/2022 3:55 PM  Subjective: Interval History: Gloria Preston is a 64 y.o. male tired resting in bed    Data:   Scheduled Meds:   cefTRIAXone (ROCEPHIN) IV  2,000 mg IntraVENous Daily    enoxaparin  30 mg SubCUTAneous Daily    azithromycin  500 mg IntraVENous Q24H    pantoprazole  40 mg IntraVENous BID    potassium chloride  10 mEq IntraVENous Q1H    gabapentin  300 mg Oral TID    carBAMazepine  100 mg Oral BID    oxyCODONE  5 mg Oral TID    baricitinib  2 mg Oral Daily    dexamethasone  10 mg IntraVENous Q12H    insulin glargine  15 Units SubCUTAneous Nightly    metoprolol tartrate  50 mg Oral BID    insulin lispro  0-12 Units SubCUTAneous TID WC    insulin lispro  0-12 Units SubCUTAneous 2 times per day    sodium chloride flush  5-40 mL IntraVENous 2 times per day    Vitamin D  2,000 Units Oral Daily    aspirin  81 mg Oral Daily    famotidine  10 mg Oral Q48H     Continuous Infusions:   dextrose 75 mL/hr at 02/02/22 0648         CBC   Recent Labs     02/01/22  1427 02/02/22  1103   WBC 10.1 11.5*   HGB 13.7 13.2*   HCT 41.5* 39.1*    173      BMP   Recent Labs     01/31/22  2201 02/01/22  1427 02/02/22  1103   * 151* 147*   K 3.5 3.5 3.3*   * 115* 111*   CO2 15* 17* 17*   PHOS  --  3.1  3.2 4.0   BUN 71* 73* 92*   CREATININE 2.0* 2.1* 2.3*     Hepatic:   Recent Labs     02/01/22  1427 02/02/22  1103   AST 28 45*   ALT 26 33   BILITOT 0.5 0.8   ALKPHOS 76 79     Troponin: No results for input(s): TROPONINI in the last 72 hours. BNP: No results for input(s): BNP in the last 72 hours. Lipids: No results for input(s): CHOL, HDL in the last 72 hours.     Invalid input(s): LDLCALCU  ABGs: No results found for: PHART, PO2ART, MLK4XUX  INR:   Recent Labs     02/01/22  1427   INR 5.10*     Renal Labs  Albumin:    Lab Results   Component Value Date    LABALBU 3.1 02/02/2022    LABALBU 3.1 02/02/2022    LABALBU 100 05/30/2012     Calcium:    Lab Results   Component Value Date    CALCIUM 8.6 02/02/2022     Phosphorus:    Lab Results   Component Value Date    PHOS 4.0 02/02/2022     U/A:    Lab Results   Component Value Date    NITRU NEGATIVE 01/31/2022    COLORU YELLOW 01/31/2022    WBCUA 3 01/31/2022    RBCUA 22 01/31/2022    MUCUS RARE 01/31/2022    BACTERIA RARE 01/31/2022    CLARITYU CLEAR 01/31/2022    SPECGRAV 1.025 01/31/2022    UROBILINOGEN NORMAL 01/31/2022    BILIRUBINUR NEGATIVE 01/31/2022    BLOODU LARGE 01/31/2022    KETUA NEGATIVE 01/31/2022     ABG:  No results found for: PHART, IJN4EYE, PO2ART, FJG0OOH, BEART, THGBART, WEC3JUP, S2HEPCGC  HgBA1c:    Lab Results   Component Value Date    LABA1C 6.0 01/29/2022     Microalbumen/Creatinine ratio:  No components found for: RUCREAT  TSH:  No results found for: TSH  IRON:  No results found for: IRON  Iron Saturation:  No components found for: PERCENTFE  TIBC:  No results found for: TIBC  FERRITIN:    Lab Results   Component Value Date    FERRITIN 2,498 01/28/2022     RPR:  No results found for: RPR  JEREMIE:  No results found for: ANATITER, JEREMIE  24 Hour Urine for Creatinine Clearance:  No components found for: CREAT4, UHRS10, UTV10      Objective:   I/O: 02/01 0701 - 02/02 0700  In: 60 [P.O.:50; I.V.:10]  Out: -   I/O last 3 completed shifts: In: 61 [P.O.:50; I.V.:10]  Out: -   I/O this shift: In: 5 [I.V.:5]  Out: -   Vitals: /68   Pulse 79   Temp 97.8 °F (36.6 °C) (Axillary)   Resp 18   Ht 6' (1.829 m)   Wt 155 lb 10.3 oz (70.6 kg)   SpO2 94%   BMI 21.11 kg/m²  {  General appearance: awake weak  HEENT: Head: Normal, normocephalic, atraumatic.   Neck: supple, symmetrical, trachea midline  Lungs: diminished breath sounds bilaterally  Heart: S1, S2 normal  Abdomen: abnormal findings:  soft nt  Extremities: edema trace  Neurologic: Mental status: alertness: Awake        Assessment and Plan:      IMP:  #1 COVID-19 pneumonia  #2 hypernatremia  #3 acute renal failure from ATN on CKD 3 creatinine 1.6  #4 type 2 diabetes  #5 hypertension  #6 weakness with some mild confusion    Plan     #1 treated for Covid and above setting  #2 sodium slowly better  #3 creatinine increased will monitor at 2.3  #3 monitor glucose control  #4 blood pressure stable  #5 monitor affect slowly recover  Replete potassium           Ar Hearn MD, MD

## 2022-02-02 NOTE — PROGRESS NOTES
Daily Progress Note    Pt. Awake, but confused this am-possible sundowners  HR stable, NSR at 80 this am, BP stable  No CP, SOB stable     Covid/no PE noted on v/Q scan  ST and junctional tachycardia--stable on meds  Has normal EF  Dementia vs sundowners   Supportive care   Cr is 2.1--thyroid is normal -his cr was 1.6 in 2017 now it is 2.1 range, possible baseline   Hx of diabetes   Supportive care   Will consider cardiac work up as outpatient when he is stable otherwise keep on medical treatment for now       Ct head   Impression:        1. No acute intracranial abnormality. 2. Diffuse parenchymal volume loss with mild-to-moderate chronic white matter   microvascular ischemic changes. 3. Chronic lacunar infarct in the right thalamus.            Covid PNA    On NC-trying to wean as able    On ABx    Tx. Per pulm and primary     Tachycardia    Idioventricular rhythm noted on tele prior    Increased lopressor dose and appears rate is better today    Echo to be done today     On IVF for hypernatremia  Will follow     Echo -- Summary-1/22   Technically difficult examination due to uncooperative patient.   Left ventricular systolic function is hyperdynamic.   Ejection fraction is visually estimated >60%.   No significant valvular disease noted.   No evidence of any pericardial effusion.     PAST MEDICAL HISTORY:  Diabetes, dysphagia, dysphonia, hyperlipidemia,  hypertension, kidney failure, and osteoarthritis.     PAST SURGICAL HISTORY:  Cervical spine surgery, endoscopy, and  colonoscopy in the past.     ALLERGIES:  No known drug allergies.     FAMILY HISTORY:  Reviewed and noncontributory.     SOCIAL HISTORY:  Former smoker, quit in 2010.  He does not drink any  alcohol.     HOME MEDICATIONS:  Tegretol, glipizide, Lipitor 80 mg every night at  bedtime, lisinopril 10 mg daily, Neurontin, metformin, and OxyContin.       Objective:   /78   Pulse 77   Temp 98.1 °F (36.7 °C) (Oral)   Resp 11   Ht 6' (1.829 m)   Wt 155 lb 10.3 oz (70.6 kg)   SpO2 92%   BMI 21.11 kg/m²     Intake/Output Summary (Last 24 hours) at 2/2/2022 1219  Last data filed at 2/2/2022 6630  Gross per 24 hour   Intake 55 ml   Output --   Net 55 ml       Medications:   Scheduled Meds:   cefTRIAXone (ROCEPHIN) IV  2,000 mg IntraVENous Daily    enoxaparin  30 mg SubCUTAneous Daily    azithromycin  500 mg IntraVENous Q24H    pantoprazole  40 mg IntraVENous BID    gabapentin  300 mg Oral TID    carBAMazepine  100 mg Oral BID    oxyCODONE  5 mg Oral TID    baricitinib  2 mg Oral Daily    dexamethasone  10 mg IntraVENous Q12H    insulin glargine  15 Units SubCUTAneous Nightly    metoprolol tartrate  50 mg Oral BID    insulin lispro  0-12 Units SubCUTAneous TID WC    insulin lispro  0-12 Units SubCUTAneous 2 times per day    sodium chloride flush  5-40 mL IntraVENous 2 times per day    Vitamin D  2,000 Units Oral Daily    aspirin  81 mg Oral Daily    famotidine  10 mg Oral Q48H      Infusions:   dextrose 75 mL/hr at 02/02/22 0648      PRN Meds:  sodium chloride flush, ondansetron **OR** ondansetron, polyethylene glycol, acetaminophen **OR** acetaminophen       Physical Exam:  Vitals:    02/02/22 1128   BP:    Pulse:    Resp:    Temp:    SpO2: 92%        General: AAO, NAD  Chest: Nontender  Cardiac: First and Second Heart Sounds are Normal, No Murmurs or Gallops noted  Lungs:Clear to auscultation and percussion. Abdomen: Soft, NT, ND, +BS  Extremities: No clubbing, no edema  Vascular:  Equal 2+ peripheral pulses.         Lab Data:  CBC:   Recent Labs     01/30/22  1416 02/01/22  1427 02/02/22  1103   WBC 5.8 10.1 11.5*   HGB 14.6 13.7 13.2*   HCT 46.2 41.5* 39.1*   MCV 95.1 91.2 89.5    147 173     BMP:   Recent Labs     01/30/22  1416 01/31/22  2201 02/01/22  1427   * 149* 151*   K 3.6 3.5 3.5   * 116* 115*   CO2 20* 15* 17*   PHOS 3.1  --  3.1  3.2   BUN 79* 71* 73*   CREATININE 2.3* 2.0* 2.1*     LIVER PROFILE: Recent Labs     01/30/22  1416 02/01/22  1427   AST 74* 28   ALT 48* 26   BILIDIR 0.2 0.2   BILITOT 0.7 0.5   ALKPHOS 84 76     PT/INR:   Recent Labs     01/30/22  1416 02/01/22  1427   PROTIME 44.1* 66.9*   INR 3.38 5.10*     APTT:   Recent Labs     01/30/22  1416 02/01/22  1427   APTT 37.3* 59.6*     BNP:  No results for input(s): BNP in the last 72 hours.       Assessment:  Patient Active Problem List    Diagnosis Date Noted    Acute kidney injury ([facility] [address] 01/28/2022       Electronically signed by Elvia Preston PA-C on 2/2/2022 at 12:19 PM    Electronically signed by Ratna Leonard MD on 2/2/22 at 12:39 PM EST

## 2022-02-02 NOTE — PLAN OF CARE
Problem: Airway Clearance - Ineffective  Goal: Achieve or maintain patent airway  Outcome: Ongoing     Problem: Gas Exchange - Impaired  Goal: Absence of hypoxia  Outcome: Ongoing  Goal: Promote optimal lung function  Outcome: Ongoing     Problem: Breathing Pattern - Ineffective  Goal: Ability to achieve and maintain a regular respiratory rate  Outcome: Ongoing     Problem:  Body Temperature -  Risk of, Imbalanced  Goal: Ability to maintain a body temperature within defined limits  Outcome: Ongoing  Goal: Will regain or maintain usual level of consciousness  Outcome: Ongoing  Goal: Complications related to the disease process, condition or treatment will be avoided or minimized  Outcome: Ongoing     Problem: Isolation Precautions - Risk of Spread of Infection  Goal: Prevent transmission of infection  Outcome: Ongoing     Problem: Nutrition Deficits  Goal: Optimize nutritional status  Outcome: Ongoing     Problem: Risk for Fluid Volume Deficit  Goal: Maintain normal heart rhythm  Outcome: Ongoing  Goal: Maintain absence of muscle cramping  Outcome: Ongoing  Goal: Maintain normal serum potassium, sodium, calcium, phosphorus, and pH  Outcome: Ongoing     Problem: Loneliness or Risk for Loneliness  Goal: Demonstrate positive use of time alone when socialization is not possible  Outcome: Ongoing     Problem: Fatigue  Goal: Verbalize increase energy and improved vitality  Outcome: Ongoing     Problem: Patient Education: Go to Patient Education Activity  Goal: Patient/Family Education  Outcome: Ongoing     Problem: Patient Education: Go to Patient Education Activity  Goal: Patient/Family Education  Outcome: Ongoing     Problem: Falls - Risk of:  Goal: Will remain free from falls  Description: Will remain free from falls  Outcome: Ongoing  Goal: Absence of physical injury  Description: Absence of physical injury  Outcome: Ongoing     Problem: Skin Integrity:  Goal: Will show no infection signs and symptoms  Description: Will show no infection signs and symptoms  Outcome: Ongoing  Goal: Absence of new skin breakdown  Description: Absence of new skin breakdown  Outcome: Ongoing

## 2022-02-02 NOTE — PROGRESS NOTES
Speech Language Pathology  Facility/Department: [address] [place] [address]   CLINICAL BEDSIDE SWALLOW EVALUATION    NAME: Indiana Hemphill  : 1945  MRN: [id number]    ADMISSION DATE: 2022  ADMITTING DIAGNOSIS: has Acute kidney injury ([address] on their problem list.  ONSET DATE: 2022     Impression  Dysphagia Diagnosis: Mild oral stage dysphagia;Mild to moderate pharyngeal stage dysphagia  Dysphagia Outcome Severity Scale: Level 4: Mild moderate dysphagia- Intermittent supervision/cueing. One - two diet consistencies restricted     Indiana Hemphill was seen for a clinical bedside swallow evaluation following admission for Covid PNA, hypoxia, AMS, acute renal failure. Pt was alert and lethargic, able to remain adequately awake for safe participation. He denies h/o dysphagia. Pt followed most basic commands for oral motor exam which demonstrated no focal weakness and generalized weakness. Vocal quality was hoarse, volitional cough strong, and volitional swallow was intact. PO trials of ice-chips, thins by spoon, cup, and straw, nectar thick by straw, pureed and soft moist solids presented. Pt was edentulous, reports his dentures are at home and that he does used them for all intake of advanced solids. Mildly prolonged mastication for soft solids with functional clearance for all textures. Suspect mild-moderate pharyngeal dysphagia due to intermittently weak laryngeal elevation, 2-3 swallows across textures indicating reduced pharyngeal clearance, and cough following 3/3 trials of thins by cup, 1/3 by straw. Pt demonstrated minimal delayed cough for nectar thick liquids by straw x 1/5    Recommend:  Soft and Bite-size/Nectar thick liquids. Total feed due to generalized weakness. Aspiration precautions. Single whole pills in puree or with nectar thick liquid.       Recent Chest Xray/CT of Chest:    Narrative   EXAMINATION:   ONE XRAY VIEW OF THE CHEST       2022 11:49 pm       COMPARISON:   Chest radiograph performed 01/29/2022.       HISTORY:   ORDERING SYSTEM PROVIDED HISTORY: congestion/coughing after swallowing   TECHNOLOGIST PROVIDED HISTORY:   Reason for exam:->congestion/coughing after swallowing   Reason for Exam: congestion/coughing after swallowing       FINDINGS:   There are left basilar infiltrates.  There is no pneumothorax.  The   mediastinal structures are unremarkable.  The upper abdomen is unremarkable. The extrathoracic soft tissues are unremarkable.           Impression   Left basilar infiltrates concerning for pneumonia.         Date of Eval: 2/2/2022  Evaluating Therapist: MARTIN Block    Current Diet level:  Current Diet : NPO  Current Liquid Diet : NPO    Primary Complaint  Patient Complaint: does not voice c/o    Pain:  Pain Assessment  Pain Assessment: Faces  Pain Level: 0    Reason for Referral  Indiana Hemphill was referred for a bedside swallow evaluation to assess the efficiency of his swallow function, identify signs and symptoms of aspiration and make recommendations regarding safe dietary consistencies, effective compensatory strategies, and safe eating environment. Treatment Plan  Requires SLP Intervention: Yes  Duration/Frequency of Treatment: 2-3 x/week x 2 weeks  D/C Recommendations: To be determined     Recommended Diet and Intervention  Diet Solids Recommendation: Dysphagia Soft and Bite-Sized (Dysphagia III)  Liquid Consistency Recommendation: Mildly Thick (Nectar)  Recommended Form of Meds: Meds in puree  Recommendations: Dysphagia treatment  Therapeutic Interventions: Diet tolerance monitoring; Therapeutic PO trials with SLP;Patient/Family education    Compensatory Swallowing Strategies  Compensatory Swallowing Strategies: Upright as possible for all oral intake; Total feed    Treatment/Goals  Short-term Goals  Timeframe for Short-term Goals: 2 weeks  Goal 1: Pt will tolerate Soft and bite-size diet and Nectar thick liquids without clinical evidence for aspiration 100%  Goal 2: Pt will tolerate trials for diet advancement with adequate mastication (dentures at home) and 0 s/s aspiration 100%  Goal 3: Caregivers will use aspiration precautions for all PO intake 100%  Long-term Goals  Timeframe for Long-term Goals: n/a    General  Chart Reviewed: Yes  Behavior/Cognition: Alert; Cooperative;Agitated  Respiratory Status: O2 via nasual cannula  O2 Device: Nasal cannula  Liters of Oxygen: 10 L  Communication Observation: Functional  Follows Directions: Simple  Dentition: Edentulous  Patient Positioning: Upright in bed  Baseline Vocal Quality: Hoarse  Volitional Cough: Strong  Prior Dysphagia History: none charted  Consistencies Administered: Ice Chips; Thin - teaspoon; Thin - cup; Thin - straw;Nectar - straw;Dysphagia Pureed (Dysphagia I); Dysphagia Soft and Bite-Sized (Dysphagia III)           Vision/Hearing  Hearing  Hearing: Within functional limits    Oral Motor Deficits  Oral/Motor  Oral Motor:  Within functional limits    Oral Phase Dysfunction  Oral Phase  Oral Phase: Exceptions     Indicators of Pharyngeal Phase Dysfunction   Pharyngeal Phase  Pharyngeal Phase: Exceptions    Prognosis  Prognosis  Prognosis for safe diet advancement: good  Barriers to reach goals: fatigue  Individuals consulted  Consulted and agree with results and recommendations: RN    Education  Patient Education: results and recommendations  Patient Education Response: No evidence of learning             Therapy Time  SLP Individual Minutes  Time In: 5153  Time Out: [address]  Minutes: [address], [place]  2/2/2022 12:37 PM

## 2022-02-02 NOTE — PROGRESS NOTES
pulmonary      SUBJECTIVE: no sob and on o2     OBJECTIVE    VITALS:  /74   Pulse 68   Temp 97.5 °F (36.4 °C) (Oral)   Resp 16   Ht 6' (1.829 m)   Wt 152 lb 5.4 oz (69.1 kg)   SpO2 94%   BMI 20.66 kg/m²   HEAD AND FACE EXAM:  No throat injection, no active exudate,no thrush  NECK EXAM;No JVD, no masses, symmetrical  CHEST EXAM; Expansion equal and symmetrical, no masses  LUNG EXAM; Good breath sounds bilaterally. There are expiratory wheezes both lungs, there are crackles at both lung bases  CARDIOVASCULAR EXAM: Positive S1 and S2, no S3 or S4, no clicks ,no murmurs  RIGHT AND LEFT LOWER EXTRIMITY EXAM: No edema, no swelling, no inflamation            LABS   Lab Results   Component Value Date    WBC 10.1 02/01/2022    HGB 13.7 02/01/2022    HCT 41.5 (L) 02/01/2022    MCV 91.2 02/01/2022     02/01/2022     Lab Results   Component Value Date    CREATININE 2.1 (H) 02/01/2022    BUN 73 (H) 02/01/2022     (H) 02/01/2022    K 3.5 02/01/2022     (H) 02/01/2022    CO2 17 (L) 02/01/2022     Lab Results   Component Value Date    INR 5.10 (HH) 02/01/2022    PROTIME 66.9 (H) 02/01/2022          Lab Results   Component Value Date    PHOS 3.1 02/01/2022    PHOS 3.2 02/01/2022    PHOS 3.1 01/30/2022      No results for input(s): PH, PO2ART, TUI9GWH, HCO3, BEART, O2SAT in the last 72 hours. Wt Readings from Last 3 Encounters:   02/01/22 152 lb 5.4 oz (69.1 kg)   03/14/17 200 lb (90.7 kg)   02/19/16 200 lb 6.4 oz (90.9 kg)               ASSESMENT  Ac resp failure  covid pneumonia  Bact pneumonia        PLAN  1. On decadron  2. Cont baricitinab  3. antibx  4.  Cont o2 and try to wean as tolerated    2/2/2022  Mikey Carrillo MD, M.D.

## 2022-02-03 LAB
ALBUMIN SERPL-MCNC: 2.8 GM/DL (ref 3.4–5)
ALBUMIN SERPL-MCNC: 2.8 GM/DL (ref 3.4–5)
ALP BLD-CCNC: 74 IU/L (ref 40–129)
ALT SERPL-CCNC: 32 U/L (ref 10–40)
ANION GAP SERPL CALCULATED.3IONS-SCNC: 17 MMOL/L (ref 4–16)
AST SERPL-CCNC: 30 IU/L (ref 15–37)
BILIRUB SERPL-MCNC: 0.5 MG/DL (ref 0–1)
BILIRUBIN DIRECT: 0.2 MG/DL (ref 0–0.3)
BILIRUBIN, INDIRECT: 0.3 MG/DL (ref 0–0.7)
BUN BLDV-MCNC: 84 MG/DL (ref 6–23)
CALCIUM SERPL-MCNC: 8.2 MG/DL (ref 8.3–10.6)
CHLORIDE BLD-SCNC: 113 MMOL/L (ref 99–110)
CO2: 17 MMOL/L (ref 21–32)
CREAT SERPL-MCNC: 2.1 MG/DL (ref 0.9–1.3)
GFR AFRICAN AMERICAN: 37 ML/MIN/1.73M2
GFR NON-AFRICAN AMERICAN: 31 ML/MIN/1.73M2
GLUCOSE BLD-MCNC: 143 MG/DL (ref 70–99)
GLUCOSE BLD-MCNC: 146 MG/DL (ref 70–99)
GLUCOSE BLD-MCNC: 156 MG/DL (ref 70–99)
GLUCOSE BLD-MCNC: 163 MG/DL (ref 70–99)
GLUCOSE BLD-MCNC: 196 MG/DL (ref 70–99)
GLUCOSE BLD-MCNC: 196 MG/DL (ref 70–99)
HCT VFR BLD CALC: 38.3 % (ref 42–52)
HEMOGLOBIN: 12.7 GM/DL (ref 13.5–18)
MCH RBC QN AUTO: 30.2 PG (ref 27–31)
MCHC RBC AUTO-ENTMCNC: 33.2 % (ref 32–36)
MCV RBC AUTO: 91.2 FL (ref 78–100)
PDW BLD-RTO: 13.9 % (ref 11.7–14.9)
PHOSPHORUS: 3.4 MG/DL (ref 2.5–4.9)
PLATELET # BLD: 149 K/CU MM (ref 140–440)
PMV BLD AUTO: 11.2 FL (ref 7.5–11.1)
POTASSIUM SERPL-SCNC: 3.5 MMOL/L (ref 3.5–5.1)
RBC # BLD: 4.2 M/CU MM (ref 4.6–6.2)
SODIUM BLD-SCNC: 147 MMOL/L (ref 135–145)
TOTAL PROTEIN: 5.6 GM/DL (ref 6.4–8.2)
WBC # BLD: 11.8 K/CU MM (ref 4–10.5)

## 2022-02-03 PROCEDURE — 2580000003 HC RX 258: Performed by: INTERNAL MEDICINE

## 2022-02-03 PROCEDURE — 6370000000 HC RX 637 (ALT 250 FOR IP): Performed by: INTERNAL MEDICINE

## 2022-02-03 PROCEDURE — 2580000003 HC RX 258: Performed by: HOSPITALIST

## 2022-02-03 PROCEDURE — 6360000002 HC RX W HCPCS: Performed by: HOSPITALIST

## 2022-02-03 PROCEDURE — C9113 INJ PANTOPRAZOLE SODIUM, VIA: HCPCS | Performed by: HOSPITALIST

## 2022-02-03 PROCEDURE — 36415 COLL VENOUS BLD VENIPUNCTURE: CPT

## 2022-02-03 PROCEDURE — 82962 GLUCOSE BLOOD TEST: CPT

## 2022-02-03 PROCEDURE — 80053 COMPREHEN METABOLIC PANEL: CPT

## 2022-02-03 PROCEDURE — 1200000000 HC SEMI PRIVATE

## 2022-02-03 PROCEDURE — 6370000000 HC RX 637 (ALT 250 FOR IP): Performed by: FAMILY MEDICINE

## 2022-02-03 PROCEDURE — 84100 ASSAY OF PHOSPHORUS: CPT

## 2022-02-03 PROCEDURE — 85027 COMPLETE CBC AUTOMATED: CPT

## 2022-02-03 PROCEDURE — 82248 BILIRUBIN DIRECT: CPT

## 2022-02-03 PROCEDURE — 2580000003 HC RX 258: Performed by: FAMILY MEDICINE

## 2022-02-03 RX ORDER — GLIPIZIDE 5 MG/1
2.5 TABLET ORAL
Status: DISCONTINUED | OUTPATIENT
Start: 2022-02-03 | End: 2022-02-10

## 2022-02-03 RX ADMIN — DEXTROSE MONOHYDRATE: 50 INJECTION, SOLUTION INTRAVENOUS at 17:06

## 2022-02-03 RX ADMIN — Medication 2000 UNITS: at 09:06

## 2022-02-03 RX ADMIN — OXYCODONE HYDROCHLORIDE 5 MG: 5 TABLET ORAL at 20:21

## 2022-02-03 RX ADMIN — ENOXAPARIN SODIUM 30 MG: 100 INJECTION SUBCUTANEOUS at 09:07

## 2022-02-03 RX ADMIN — SODIUM CHLORIDE, PRESERVATIVE FREE 10 ML: 5 INJECTION INTRAVENOUS at 20:30

## 2022-02-03 RX ADMIN — DEXAMETHASONE SODIUM PHOSPHATE 10 MG: 10 INJECTION INTRAMUSCULAR; INTRAVENOUS at 22:11

## 2022-02-03 RX ADMIN — METOPROLOL TARTRATE 50 MG: 50 TABLET, FILM COATED ORAL at 09:07

## 2022-02-03 RX ADMIN — ASPIRIN 81 MG CHEWABLE TABLET 81 MG: 81 TABLET CHEWABLE at 09:06

## 2022-02-03 RX ADMIN — GABAPENTIN 300 MG: 300 CAPSULE ORAL at 14:21

## 2022-02-03 RX ADMIN — AZITHROMYCIN MONOHYDRATE 500 MG: 500 INJECTION, POWDER, LYOPHILIZED, FOR SOLUTION INTRAVENOUS at 11:15

## 2022-02-03 RX ADMIN — BARICITINIB 2 MG: 2 TABLET, FILM COATED ORAL at 09:07

## 2022-02-03 RX ADMIN — CARBAMAZEPINE 100 MG: 100 TABLET, EXTENDED RELEASE ORAL at 09:07

## 2022-02-03 RX ADMIN — SODIUM CHLORIDE, PRESERVATIVE FREE 10 ML: 5 INJECTION INTRAVENOUS at 09:07

## 2022-02-03 RX ADMIN — GABAPENTIN 300 MG: 300 CAPSULE ORAL at 09:06

## 2022-02-03 RX ADMIN — PANTOPRAZOLE SODIUM 40 MG: 40 INJECTION, POWDER, FOR SOLUTION INTRAVENOUS at 20:21

## 2022-02-03 RX ADMIN — METOPROLOL TARTRATE 50 MG: 50 TABLET, FILM COATED ORAL at 20:21

## 2022-02-03 RX ADMIN — OXYCODONE HYDROCHLORIDE 5 MG: 5 TABLET ORAL at 09:06

## 2022-02-03 RX ADMIN — GABAPENTIN 300 MG: 300 CAPSULE ORAL at 20:21

## 2022-02-03 RX ADMIN — CARBAMAZEPINE 100 MG: 100 TABLET, EXTENDED RELEASE ORAL at 20:29

## 2022-02-03 RX ADMIN — DEXTROSE MONOHYDRATE: 50 INJECTION, SOLUTION INTRAVENOUS at 01:56

## 2022-02-03 RX ADMIN — OXYCODONE HYDROCHLORIDE 5 MG: 5 TABLET ORAL at 14:21

## 2022-02-03 RX ADMIN — PANTOPRAZOLE SODIUM 40 MG: 40 INJECTION, POWDER, FOR SOLUTION INTRAVENOUS at 09:07

## 2022-02-03 RX ADMIN — CEFTRIAXONE SODIUM 2000 MG: 2 INJECTION, POWDER, FOR SOLUTION INTRAMUSCULAR; INTRAVENOUS at 09:17

## 2022-02-03 RX ADMIN — DEXAMETHASONE SODIUM PHOSPHATE 10 MG: 10 INJECTION INTRAMUSCULAR; INTRAVENOUS at 09:07

## 2022-02-03 ASSESSMENT — PAIN SCALES - GENERAL
PAINLEVEL_OUTOF10: 2
PAINLEVEL_OUTOF10: 4

## 2022-02-03 NOTE — PROGRESS NOTES
Hospitalist Progress Note      Name:  Genny Mauro /Age/Sex: 1945  (64 y.o. male)   MRN & CSN:  [id number] & [id number] Admission Date/Time: 2022  1:32 PM   Location:  8344/4687-[id number] PCP: Karlee Brooks MD         Hospital Day: 7      Assessment and Plan:     Patient is 64-year-old male past medical history of chronic pain syndrome, chronic oxycodone use, diabetes, hypertension, hyperlipidemia who was admitted for confusion, generalized weakness. Patient was found to have COVID-19 pneumonia. Patient continues to require baricitinib, dexamethasone and antibiotics. Discontinue restraints and anticipate discharge to [facility] tomorrow. #.  Acute hypoxic respiratory failure due to viral pneumonia from COVID-19  #. Viral pneumonia from COVID-19  Patient oxygen requirement has significantly improved 2 L. Continue bronchodilators and wean of oxygen as tolerated  Continue dexamethasone to 20 mg a day  Continue baricitinib  Continue antibiotics     #. Encephalopathy, likely metabolic  #. Hyponatremia likely hypovolemic  #. Acute kidney injury likely due to ATN likely due to prerenal azotemia  Suspect metabolic encephalopathy: Due to combination of hyponatremia, COVID-19 pneumonia and hypoxia  Discontinue restraints  Continue to reorient patient  Maintain normal wake sleep cycle  Nephrology following for hypernatremia, acute kidney injury. #.  Sinus tachycardia/junctional tachycardia  Cardiology following  Continue metoprolol     #. Generalized weakness and physical deconditioning  PT/OT  ? Rehab once medically stable for discharge     #. Other medical problem include hypertension, hyperlipidemia, diabetes mellitus, type with hyperglycemia  Continue current medications        DVT prophylaxis: Lovenox  CODE STATUS: Total support    Subjective. :     Patient was seen and examined today. No acute events overnight.   Patient oxygen requirement has significantly improved and currently down to 2 JENNIFER    Objective:   Vitals:   Vitals:    02/03/22 0738   BP: 138/78   Pulse: 74   Resp: 11   Temp: 97.4 °F (36.3 °C)   SpO2: 90%         Physical Exam:   GEN: Awake, alert, in no apparent distress but disoriented. HEENT: Atraumatic, normocephalic, PERRLA, EOMI  NECK: Supple, no apparent thyromegaly or masses. RESP: Clear lungs to auscultation  CARDIO/VASC: Regular rate and rhythm, normal S1, S2, no murmurs  GI: Abdomen is soft, nontender, no significant organomegaly noted, bowel sounds present  MSK: No gross joint deformities. Skin: No significant rashes, skin lesions noted  Neuro: Awake, alert, disoriented, cranial nerves grossly intact, moving all extremities.   Medications:   Medications:    glipiZIDE  2.5 mg Oral QAM AC    cefTRIAXone (ROCEPHIN) IV  2,000 mg IntraVENous Daily    enoxaparin  30 mg SubCUTAneous Daily    azithromycin  500 mg IntraVENous Q24H    pantoprazole  40 mg IntraVENous BID    gabapentin  300 mg Oral TID    carBAMazepine  100 mg Oral BID    oxyCODONE  5 mg Oral TID    baricitinib  2 mg Oral Daily    dexamethasone  10 mg IntraVENous Q12H    insulin glargine  15 Units SubCUTAneous Nightly    metoprolol tartrate  50 mg Oral BID    insulin lispro  0-12 Units SubCUTAneous TID     insulin lispro  0-12 Units SubCUTAneous 2 times per day    sodium chloride flush  5-40 mL IntraVENous 2 times per day    Vitamin D  2,000 Units Oral Daily    aspirin  81 mg Oral Daily    famotidine  10 mg Oral Q48H      Infusions:    dextrose 75 mL/hr at 02/03/22 0156     PRN Meds: sodium chloride flush, 5-40 mL, PRN  ondansetron, 4 mg, Q8H PRN   Or  ondansetron, 4 mg, Q6H PRN  polyethylene glycol, 17 g, Daily PRN  acetaminophen, 650 mg, Q6H PRN   Or  acetaminophen, 650 mg, Q6H PRN          Electronically signed by Melissa Duke MD on 2/3/2022 at 8:44 AM

## 2022-02-03 NOTE — PLAN OF CARE
Problem: Airway Clearance - Ineffective  Goal: Achieve or maintain patent airway  2/3/2022 0630 by Bryn Gagnon RN  Outcome: Ongoing  2/2/2022 1644 by Annmarie Cody LPN  Outcome: Ongoing     Problem: Gas Exchange - Impaired  Goal: Absence of hypoxia  2/3/2022 0630 by Bryn Gagnon RN  Outcome: Ongoing  2/2/2022 1644 by Annmarie Cody LPN  Outcome: Ongoing  Goal: Promote optimal lung function  2/3/2022 0630 by Bryn Gagnon RN  Outcome: Ongoing  2/2/2022 1644 by Annmarie Cody LPN  Outcome: Ongoing     Problem: Breathing Pattern - Ineffective  Goal: Ability to achieve and maintain a regular respiratory rate  2/3/2022 0630 by Bryn Gagnon RN  Outcome: Ongoing  2/2/2022 1644 by Annmarie Cody LPN  Outcome: Ongoing     Problem:  Body Temperature -  Risk of, Imbalanced  Goal: Ability to maintain a body temperature within defined limits  2/3/2022 0630 by Bryn Gagnon RN  Outcome: Ongoing  2/2/2022 1644 by Annmarie Cody LPN  Outcome: Ongoing  Goal: Will regain or maintain usual level of consciousness  2/3/2022 0630 by Bryn Gagnon RN  Outcome: Ongoing  2/2/2022 1644 by Annmarie Cody LPN  Outcome: Ongoing  Goal: Complications related to the disease process, condition or treatment will be avoided or minimized  2/3/2022 0630 by Bryn Gagnon RN  Outcome: Ongoing  2/2/2022 1644 by Annmarie oCdy LPN  Outcome: Ongoing     Problem: Isolation Precautions - Risk of Spread of Infection  Goal: Prevent transmission of infection  2/3/2022 0630 by Bryn Gagnon RN  Outcome: Ongoing  2/2/2022 1644 by Annmarie Cody LPN  Outcome: Ongoing     Problem: Nutrition Deficits  Goal: Optimize nutritional status  2/3/2022 0630 by Bryn Gagnon RN  Outcome: Ongoing  2/2/2022 1644 by Annmarie Cody LPN  Outcome: Ongoing     Problem: Risk for Fluid Volume Deficit  Goal: Maintain normal heart rhythm  2/3/2022 0630 by Bryn Gagnon RN  Outcome: Ongoing  2/2/2022 1644 by Annmarie Cody LPN  Outcome: Ongoing  Goal: Maintain absence of muscle cramping  2/3/2022 0630 by Montrell Dorado RN  Outcome: Ongoing  2/2/2022 1644 by Guillermina Calvillo LPN  Outcome: Ongoing  Goal: Maintain normal serum potassium, sodium, calcium, phosphorus, and pH  2/3/2022 0630 by Montrell Dorado RN  Outcome: Ongoing  2/2/2022 1644 by Guillermina Calvillo LPN  Outcome: Ongoing     Problem: Loneliness or Risk for Loneliness  Goal: Demonstrate positive use of time alone when socialization is not possible  2/3/2022 0630 by Montrell Dorado RN  Outcome: Ongoing  2/2/2022 1644 by Guillermina Calvillo LPN  Outcome: Ongoing     Problem: Fatigue  Goal: Verbalize increase energy and improved vitality  2/3/2022 0630 by Montrell Dorado RN  Outcome: Ongoing  2/2/2022 1644 by Guillermina Calvillo LPN  Outcome: Ongoing     Problem: Patient Education: Go to Patient Education Activity  Goal: Patient/Family Education  2/3/2022 0630 by Montrell Dorado RN  Outcome: Ongoing  2/2/2022 1644 by Guillermina Calvillo LPN  Outcome: Ongoing     Problem: Falls - Risk of:  Goal: Will remain free from falls  Description: Will remain free from falls  2/3/2022 0630 by Montrell Dorado RN  Outcome: Ongoing  2/2/2022 1644 by Guillermina Calvlilo LPN  Outcome: Ongoing  Goal: Absence of physical injury  Description: Absence of physical injury  2/3/2022 0630 by Montrell Dorado RN  Outcome: Ongoing  2/2/2022 1644 by Guillermina Calvillo LPN  Outcome: Ongoing     Problem: Skin Integrity:  Goal: Will show no infection signs and symptoms  Description: Will show no infection signs and symptoms  2/3/2022 0630 by Montrell Dorado RN  Outcome: Ongoing  2/2/2022 1644 by Guilelrmina Calvillo LPN  Outcome: Ongoing  Goal: Absence of new skin breakdown  Description: Absence of new skin breakdown  2/3/2022 0630 by Montrell Dorado RN  Outcome: Ongoing  2/2/2022 1644 by Guillermina Calvillo LPN  Outcome: Ongoing

## 2022-02-03 NOTE — PROGRESS NOTES
pulmonary      SUBJECTIVE: on o2 and no sob at rest      OBJECTIVE    VITALS:  /73   Pulse 70   Temp 97.7 °F (36.5 °C) (Oral)   Resp 24   Ht 6' (1.829 m)   Wt 155 lb 10.3 oz (70.6 kg)   SpO2 96%   BMI 21.11 kg/m²   HEAD AND FACE EXAM:  No throat injection, no active exudate,no thrush  NECK EXAM;No JVD, no masses, symmetrical  CHEST EXAM; Expansion equal and symmetrical, no masses  LUNG EXAM; Good breath sounds bilaterally. There are expiratory wheezes both lungs, there are crackles at both lung bases  CARDIOVASCULAR EXAM: Positive S1 and S2, no S3 or S4, no clicks ,no murmurs  RIGHT AND LEFT LOWER EXTRIMITY EXAM: No edema, no swelling, no inflamation            LABS   Lab Results   Component Value Date    WBC 11.5 (H) 02/02/2022    HGB 13.2 (L) 02/02/2022    HCT 39.1 (L) 02/02/2022    MCV 89.5 02/02/2022     02/02/2022     Lab Results   Component Value Date    CREATININE 2.3 (H) 02/02/2022    BUN 92 (H) 02/02/2022     (H) 02/02/2022    K 3.3 (L) 02/02/2022     (H) 02/02/2022    CO2 17 (L) 02/02/2022     Lab Results   Component Value Date    INR 5.10 (HH) 02/01/2022    PROTIME 66.9 (H) 02/01/2022          Lab Results   Component Value Date    PHOS 4.0 02/02/2022    PHOS 3.1 02/01/2022    PHOS 3.2 02/01/2022      No results for input(s): PH, PO2ART, PMI0NRO, HCO3, BEART, O2SAT in the last 72 hours. Wt Readings from Last 3 Encounters:   02/02/22 155 lb 10.3 oz (70.6 kg)   03/14/17 200 lb (90.7 kg)   02/19/16 200 lb 6.4 oz (90.9 kg)               ASSESMENT  Ac resp failure  covid pneumonia  Bact pneumonia super added        PLAN  1. cpm  2.  Cont o2    2/3/2022  Elton Robertson MD, M.D.

## 2022-02-03 NOTE — CARE COORDINATION
LSW spoke with pt wife regarding PT/Ot recommendation for SNF. Wife is agreeable. LSW informed her since pt has COVID the only facility in [facility] that take his insurance is [facility]. Wife requested [facility]. LSW called [facility] and spoke with Alana. She will look over pt info. LSW requested updated PT/OT notes WB note placed.

## 2022-02-03 NOTE — PROGRESS NOTES
Daily Progress Note    Pt. Awake, more alert- able to answer questions appropriately  HR stable, NSR at 70 this am, BP stable  No CP, SOB stable      More awake alert today  Less confused  Denied chest pain  Remains in sinus rate is stable  Keep on betablockers from cardiac stand--tachycardia stable   Normal Ef on echo   covid noted, PE negative on V/Q scan  Cr is 2.1--thyroid is normal -his cr was 1.6 in 2017 now it is 2.1 range, possible baseline   Hx of diabetes   Supportive care   Will consider cardiac work up as outpatient when he is stable otherwise keep on medical treatment for now        Covid PNA    On NC-trying to wean as able-down to 2 L today    On ABx    Tx. Per pulm and primary     Tachycardia    Idioventricular rhythm noted on tele prior    Increased lopressor dose and appears rate is better today    Echo stable    May need stress test after d/t ectopy and rhythm change     On IVF for hypernatremia  Increase activity  Will follow     Echo -- Summary-1/22   Technically difficult examination due to uncooperative patient.   Left ventricular systolic function is hyperdynamic.   Ejection fraction is visually estimated >60%.   No significant valvular disease noted.   No evidence of any pericardial effusion.     PAST MEDICAL HISTORY:  Diabetes, dysphagia, dysphonia, hyperlipidemia,  hypertension, kidney failure, and osteoarthritis.     PAST SURGICAL HISTORY:  Cervical spine surgery, endoscopy, and  colonoscopy in the past.     ALLERGIES:  No known drug allergies.     FAMILY HISTORY:  Reviewed and noncontributory.     SOCIAL HISTORY:  Former smoker, quit in 2010.  He does not drink any  alcohol.     HOME MEDICATIONS:  Tegretol, glipizide, Lipitor 80 mg every night at  bedtime, lisinopril 10 mg daily, Neurontin, metformin, and OxyContin.     Objective:   /78   Pulse 74   Temp 97.4 °F (36.3 °C) (Oral)   Resp 11   Ht 6' (1.829 m)   Wt 155 lb 10.3 oz (70.6 kg)   SpO2 90%   BMI 21.11 kg/m² Intake/Output Summary (Last 24 hours) at 2/3/2022 1044  Last data filed at 2/3/2022 0906  Gross per 24 hour   Intake 10 ml   Output --   Net 10 ml       Medications:   Scheduled Meds:   glipiZIDE  2.5 mg Oral QAM AC    enoxaparin  30 mg SubCUTAneous Daily    azithromycin  500 mg IntraVENous Q24H    pantoprazole  40 mg IntraVENous BID    gabapentin  300 mg Oral TID    carBAMazepine  100 mg Oral BID    oxyCODONE  5 mg Oral TID    baricitinib  2 mg Oral Daily    dexamethasone  10 mg IntraVENous Q12H    insulin glargine  15 Units SubCUTAneous Nightly    metoprolol tartrate  50 mg Oral BID    insulin lispro  0-12 Units SubCUTAneous TID WC    insulin lispro  0-12 Units SubCUTAneous 2 times per day    sodium chloride flush  5-40 mL IntraVENous 2 times per day    Vitamin D  2,000 Units Oral Daily    aspirin  81 mg Oral Daily    famotidine  10 mg Oral Q48H      Infusions:   dextrose 75 mL/hr at 02/03/22 0156      PRN Meds:  sodium chloride flush, ondansetron **OR** ondansetron, polyethylene glycol, acetaminophen **OR** acetaminophen       Physical Exam:  Vitals:    02/03/22 0738   BP: 138/78   Pulse: 74   Resp: 11   Temp: 97.4 °F (36.3 °C)   SpO2: 90%        General: AAO, NAD  Chest: Nontender  Cardiac: First and Second Heart Sounds are Normal, No Murmurs or Gallops noted  Lungs:Clear to auscultation and percussion. Abdomen: Soft, NT, ND, +BS  Extremities: No clubbing, no edema  Vascular:  Equal 2+ peripheral pulses.         Lab Data:  CBC:   Recent Labs     02/01/22  1427 02/02/22  1103   WBC 10.1 11.5*   HGB 13.7 13.2*   HCT 41.5* 39.1*   MCV 91.2 89.5    173     BMP:   Recent Labs     01/31/22  2201 02/01/22  1427 02/02/22  1103   * 151* 147*   K 3.5 3.5 3.3*   * 115* 111*   CO2 15* 17* 17*   PHOS  --  3.1  3.2 4.0   BUN 71* 73* 92*   CREATININE 2.0* 2.1* 2.3*     LIVER PROFILE:   Recent Labs     02/01/22  1427 02/02/22  1103   AST 28 45*   ALT 26 33   BILIDIR 0.2 0.2   BILITOT 0.5 0. 8   ALKPHOS 76 79     PT/INR:   Recent Labs     02/01/22  1427   PROTIME 66.9*   INR 5.10*     APTT:   Recent Labs     02/01/22  1427   APTT 59.6*     BNP:  No results for input(s): BNP in the last 72 hours.       Assessment:  Patient Active Problem List    Diagnosis Date Noted    Acute kidney injury ([facility] [address] 01/28/2022       Electronically signed by Emil Shields PA-C on 2/3/2022 at 10:44 AM    Electronically signed by Goldie Dickerson MD on 2/3/22 at 2:41 PM EST

## 2022-02-03 NOTE — PROGRESS NOTES
Component Value Date    CALCIUM 8.6 02/02/2022     Phosphorus:    Lab Results   Component Value Date    PHOS 4.0 02/02/2022     U/A:    Lab Results   Component Value Date    NITRU NEGATIVE 01/31/2022    COLORU YELLOW 01/31/2022    WBCUA 3 01/31/2022    RBCUA 22 01/31/2022    MUCUS RARE 01/31/2022    BACTERIA RARE 01/31/2022    CLARITYU CLEAR 01/31/2022    SPECGRAV 1.025 01/31/2022    UROBILINOGEN NORMAL 01/31/2022    BILIRUBINUR NEGATIVE 01/31/2022    BLOODU LARGE 01/31/2022    KETUA NEGATIVE 01/31/2022     ABG:  No results found for: PHART, QOO0MWQ, PO2ART, QON1CUZ, BEART, THGBART, VXT9JVE, H7NQOBCN  HgBA1c:    Lab Results   Component Value Date    LABA1C 6.0 01/29/2022     Microalbumen/Creatinine ratio:  No components found for: RUCREAT  TSH:  No results found for: TSH  IRON:  No results found for: IRON  Iron Saturation:  No components found for: PERCENTFE  TIBC:  No results found for: TIBC  FERRITIN:    Lab Results   Component Value Date    FERRITIN 2,498 01/28/2022     RPR:  No results found for: RPR  JEREMIE:  No results found for: ANATITER, JEREMIE  24 Hour Urine for Creatinine Clearance:  No components found for: CREAT4, UHRS10, UTV10      Objective:   I/O: 02/02 0701 - 02/03 0700  In: 5 [I.V.:5]  Out: -   I/O last 3 completed shifts: In: 72 [P.O.:50; I.V.:15]  Out: -   No intake/output data recorded. Vitals: /73   Pulse 70   Temp 97.7 °F (36.5 °C) (Oral)   Resp 24   Ht 6' (1.829 m)   Wt 155 lb 10.3 oz (70.6 kg)   SpO2 96%   BMI 21.11 kg/m²  {  General appearance: awake weak  HEENT: Head: Normal, normocephalic, atraumatic.   Neck: supple, symmetrical, trachea midline  Lungs: diminished breath sounds bilaterally  Heart: S1, S2 normal  Abdomen: abnormal findings:  soft nt  Extremities: edema trace  Neurologic: Mental status: alertness: Awake        Assessment and Plan:      IMP:  #1 COVID-19 pneumonia  #2 hypernatremia  #3 acute renal failure from ATN on CKD 3 creatinine 1.6  #4 type 2 diabetes  #5 hypertension  #6 weakness with some mild confusion    Plan     #1 treat for Covid in the above setting  #2 sodium was somewhat increased follow-up labs today follow-up repeat potassium  #3 renal function creatinine was at 2.3 not recorded urine output await repeat labs today not appear uremic but concern  #4 monitor and try to improve glucose  #5 blood pressure slightly below  #6 monitor affect  Follow with supportive care for now         Millicent Conroy MD, MD

## 2022-02-04 LAB
ALBUMIN SERPL-MCNC: 2.6 GM/DL (ref 3.4–5)
ALBUMIN SERPL-MCNC: 2.6 GM/DL (ref 3.4–5)
ALP BLD-CCNC: 72 IU/L (ref 40–129)
ALT SERPL-CCNC: 29 U/L (ref 10–40)
ANION GAP SERPL CALCULATED.3IONS-SCNC: 15 MMOL/L (ref 4–16)
AST SERPL-CCNC: 29 IU/L (ref 15–37)
BILIRUB SERPL-MCNC: 0.4 MG/DL (ref 0–1)
BILIRUBIN DIRECT: 0.2 MG/DL (ref 0–0.3)
BILIRUBIN, INDIRECT: 0.2 MG/DL (ref 0–0.7)
BUN BLDV-MCNC: 74 MG/DL (ref 6–23)
CALCIUM SERPL-MCNC: 7.8 MG/DL (ref 8.3–10.6)
CHLORIDE BLD-SCNC: 114 MMOL/L (ref 99–110)
CO2: 16 MMOL/L (ref 21–32)
CREAT SERPL-MCNC: 2 MG/DL (ref 0.9–1.3)
GFR AFRICAN AMERICAN: 39 ML/MIN/1.73M2
GFR NON-AFRICAN AMERICAN: 32 ML/MIN/1.73M2
GLUCOSE BLD-MCNC: 105 MG/DL (ref 70–99)
GLUCOSE BLD-MCNC: 105 MG/DL (ref 70–99)
GLUCOSE BLD-MCNC: 120 MG/DL (ref 70–99)
GLUCOSE BLD-MCNC: 127 MG/DL (ref 70–99)
GLUCOSE BLD-MCNC: 128 MG/DL (ref 70–99)
GLUCOSE BLD-MCNC: 144 MG/DL (ref 70–99)
GLUCOSE BLD-MCNC: 154 MG/DL (ref 70–99)
HCT VFR BLD CALC: 32.5 % (ref 42–52)
HEMOGLOBIN: 11.2 GM/DL (ref 13.5–18)
MCH RBC QN AUTO: 30.4 PG (ref 27–31)
MCHC RBC AUTO-ENTMCNC: 34.5 % (ref 32–36)
MCV RBC AUTO: 88.3 FL (ref 78–100)
PDW BLD-RTO: 13.7 % (ref 11.7–14.9)
PHOSPHORUS: 3.3 MG/DL (ref 2.5–4.9)
PLATELET # BLD: 134 K/CU MM (ref 140–440)
PMV BLD AUTO: 11.3 FL (ref 7.5–11.1)
POTASSIUM SERPL-SCNC: 3.3 MMOL/L (ref 3.5–5.1)
RBC # BLD: 3.68 M/CU MM (ref 4.6–6.2)
SODIUM BLD-SCNC: 145 MMOL/L (ref 135–145)
TOTAL PROTEIN: 5.1 GM/DL (ref 6.4–8.2)
WBC # BLD: 10.5 K/CU MM (ref 4–10.5)

## 2022-02-04 PROCEDURE — 94761 N-INVAS EAR/PLS OXIMETRY MLT: CPT

## 2022-02-04 PROCEDURE — 97530 THERAPEUTIC ACTIVITIES: CPT

## 2022-02-04 PROCEDURE — 6360000002 HC RX W HCPCS: Performed by: INTERNAL MEDICINE

## 2022-02-04 PROCEDURE — 81001 URINALYSIS AUTO W/SCOPE: CPT

## 2022-02-04 PROCEDURE — 1200000000 HC SEMI PRIVATE

## 2022-02-04 PROCEDURE — 6360000002 HC RX W HCPCS: Performed by: HOSPITALIST

## 2022-02-04 PROCEDURE — C9113 INJ PANTOPRAZOLE SODIUM, VIA: HCPCS | Performed by: HOSPITALIST

## 2022-02-04 PROCEDURE — 2580000003 HC RX 258: Performed by: HOSPITALIST

## 2022-02-04 PROCEDURE — 36415 COLL VENOUS BLD VENIPUNCTURE: CPT

## 2022-02-04 PROCEDURE — 6370000000 HC RX 637 (ALT 250 FOR IP): Performed by: FAMILY MEDICINE

## 2022-02-04 PROCEDURE — 84100 ASSAY OF PHOSPHORUS: CPT

## 2022-02-04 PROCEDURE — 6370000000 HC RX 637 (ALT 250 FOR IP): Performed by: INTERNAL MEDICINE

## 2022-02-04 PROCEDURE — 92526 ORAL FUNCTION THERAPY: CPT

## 2022-02-04 PROCEDURE — 97112 NEUROMUSCULAR REEDUCATION: CPT

## 2022-02-04 PROCEDURE — 2580000003 HC RX 258: Performed by: FAMILY MEDICINE

## 2022-02-04 PROCEDURE — 2580000003 HC RX 258: Performed by: INTERNAL MEDICINE

## 2022-02-04 PROCEDURE — 80053 COMPREHEN METABOLIC PANEL: CPT

## 2022-02-04 PROCEDURE — 97535 SELF CARE MNGMENT TRAINING: CPT

## 2022-02-04 PROCEDURE — 82962 GLUCOSE BLOOD TEST: CPT

## 2022-02-04 PROCEDURE — 84156 ASSAY OF PROTEIN URINE: CPT

## 2022-02-04 PROCEDURE — 82248 BILIRUBIN DIRECT: CPT

## 2022-02-04 PROCEDURE — 85027 COMPLETE CBC AUTOMATED: CPT

## 2022-02-04 PROCEDURE — 2700000000 HC OXYGEN THERAPY PER DAY

## 2022-02-04 PROCEDURE — 82570 ASSAY OF URINE CREATININE: CPT

## 2022-02-04 RX ORDER — DEXTROSE AND POTASSIUM CHLORIDE 5; .15 G/100ML; G/100ML
SOLUTION INTRAVENOUS CONTINUOUS
Status: DISCONTINUED | OUTPATIENT
Start: 2022-02-04 | End: 2022-02-08

## 2022-02-04 RX ORDER — POTASSIUM CHLORIDE 20 MEQ/1
10 TABLET, EXTENDED RELEASE ORAL 2 TIMES DAILY
Status: DISCONTINUED | OUTPATIENT
Start: 2022-02-04 | End: 2022-02-09

## 2022-02-04 RX ADMIN — GABAPENTIN 300 MG: 300 CAPSULE ORAL at 21:59

## 2022-02-04 RX ADMIN — POTASSIUM CHLORIDE 10 MEQ: 20 TABLET, EXTENDED RELEASE ORAL at 21:59

## 2022-02-04 RX ADMIN — POTASSIUM CHLORIDE AND DEXTROSE MONOHYDRATE: 150; 5 INJECTION, SOLUTION INTRAVENOUS at 13:47

## 2022-02-04 RX ADMIN — DEXAMETHASONE SODIUM PHOSPHATE 10 MG: 10 INJECTION INTRAMUSCULAR; INTRAVENOUS at 21:59

## 2022-02-04 RX ADMIN — OXYCODONE HYDROCHLORIDE 5 MG: 5 TABLET ORAL at 22:02

## 2022-02-04 RX ADMIN — METOPROLOL TARTRATE 50 MG: 50 TABLET, FILM COATED ORAL at 21:58

## 2022-02-04 RX ADMIN — FAMOTIDINE 10 MG: 20 TABLET, FILM COATED ORAL at 10:14

## 2022-02-04 RX ADMIN — DEXAMETHASONE SODIUM PHOSPHATE 10 MG: 10 INJECTION INTRAMUSCULAR; INTRAVENOUS at 10:42

## 2022-02-04 RX ADMIN — DEXTROSE MONOHYDRATE: 50 INJECTION, SOLUTION INTRAVENOUS at 07:06

## 2022-02-04 RX ADMIN — CARBAMAZEPINE 100 MG: 100 TABLET, EXTENDED RELEASE ORAL at 10:13

## 2022-02-04 RX ADMIN — PANTOPRAZOLE SODIUM 40 MG: 40 INJECTION, POWDER, FOR SOLUTION INTRAVENOUS at 21:59

## 2022-02-04 RX ADMIN — POTASSIUM CHLORIDE 10 MEQ: 20 TABLET, EXTENDED RELEASE ORAL at 13:14

## 2022-02-04 RX ADMIN — BARICITINIB 2 MG: 2 TABLET, FILM COATED ORAL at 10:14

## 2022-02-04 RX ADMIN — OXYCODONE HYDROCHLORIDE 5 MG: 5 TABLET ORAL at 10:14

## 2022-02-04 RX ADMIN — AZITHROMYCIN MONOHYDRATE 500 MG: 500 INJECTION, POWDER, LYOPHILIZED, FOR SOLUTION INTRAVENOUS at 10:15

## 2022-02-04 RX ADMIN — PANTOPRAZOLE SODIUM 40 MG: 40 INJECTION, POWDER, FOR SOLUTION INTRAVENOUS at 10:42

## 2022-02-04 RX ADMIN — SODIUM CHLORIDE, PRESERVATIVE FREE 10 ML: 5 INJECTION INTRAVENOUS at 10:11

## 2022-02-04 RX ADMIN — SODIUM CHLORIDE, PRESERVATIVE FREE 10 ML: 5 INJECTION INTRAVENOUS at 21:59

## 2022-02-04 RX ADMIN — OXYCODONE HYDROCHLORIDE 5 MG: 5 TABLET ORAL at 13:14

## 2022-02-04 RX ADMIN — METOPROLOL TARTRATE 50 MG: 50 TABLET, FILM COATED ORAL at 10:14

## 2022-02-04 RX ADMIN — GABAPENTIN 300 MG: 300 CAPSULE ORAL at 10:14

## 2022-02-04 RX ADMIN — GABAPENTIN 300 MG: 300 CAPSULE ORAL at 13:14

## 2022-02-04 RX ADMIN — GLIPIZIDE 2.5 MG: 5 TABLET ORAL at 05:27

## 2022-02-04 RX ADMIN — ASPIRIN 81 MG CHEWABLE TABLET 81 MG: 81 TABLET CHEWABLE at 10:13

## 2022-02-04 RX ADMIN — Medication 2000 UNITS: at 10:13

## 2022-02-04 RX ADMIN — ENOXAPARIN SODIUM 30 MG: 100 INJECTION SUBCUTANEOUS at 10:15

## 2022-02-04 RX ADMIN — CARBAMAZEPINE 100 MG: 100 TABLET, EXTENDED RELEASE ORAL at 22:01

## 2022-02-04 ASSESSMENT — PAIN SCALES - GENERAL
PAINLEVEL_OUTOF10: 3
PAINLEVEL_OUTOF10: 0
PAINLEVEL_OUTOF10: 6
PAINLEVEL_OUTOF10: 4
PAINLEVEL_OUTOF10: 6

## 2022-02-04 NOTE — PROGRESS NOTES
[id number] [place] OF SPEECH/LANGUAGE PATHOLOGY  DAILY PROGRESS NOTE  Maurice Sifuentes  2/4/2022  [id number]  Hypernatremia [E87.0]  Hypoxia [R09.02]  Elevated serum creatinine [R79.89]  Acute kidney injury ([address] [N17.9]  Elevated lipase [R74.8]  Altered mental status, unspecified altered mental status type [R41.82]  COVID [U07.1]  No Known Allergies      Pt was seen this date for dysphagia treatment. IMPRESSION AND RECOMMENDATIONS:   Maurice Sifuentes was seen for a bedside swallowing treatment and diet tolerance monitoring. Pt was alert throughout assessment. Nursing reports pt was having difficulty with solids on meal tray. For today's assessment pt was positioned upright in the chair and accepted PO trials of puree, soft solids, nectar/honey thick liquids and thin liquids. Prolonged mastication, slow oral A-P transit and moderate lingual residue was observed with trials of soft solids. Pharyngeal swallow appeared delayed with reduced laryngeal elevation. Pt demonstrated a weak cough with trials of thin and nectar thick liquids and soft solids. Recommend downgrade diet to pureed solids/honey thick liquids with strict aspiration precautions. SLP will continue to follow.       GOALS (current status in bold):  Short-term Goals  Timeframe for Short-term Goals: 2 weeks  Goal 1: Pt will tolerate Soft and bite-size diet and Nectar thick liquids without clinical evidence for aspiration 100%  Goal not met, modify   Goal 2: Pt will tolerate trials for diet advancement with adequate mastication (dentures at home) and 0 s/s aspiration 100% goal not being met  Goal 3: Caregivers will use aspiration precautions for all PO intake 100%  Goal being met, continue         EDUCATION: spoke with nursing about diet level downgrade     PAIN RATING (0-10 Scale): denies   Time in/Time out: SLP Individual Minutes  Time In: 1600  Time Out: 1630  Minutes: 30    Visit number: 2    Mariama Harper MS, CCC-SLP, 2/4/2022

## 2022-02-04 NOTE — PROGRESS NOTES
pulmonary      SUBJECTIVE: stable and no sob at rest     OBJECTIVE    VITALS:  /82   Pulse 90   Temp 98.5 °F (36.9 °C) (Oral)   Resp 28   Ht 6' (1.829 m)   Wt 161 lb 6 oz (73.2 kg)   SpO2 90%   BMI 21.89 kg/m²   HEAD AND FACE EXAM:  No throat injection, no active exudate,no thrush  NECK EXAM;No JVD, no masses, symmetrical  CHEST EXAM; Expansion equal and symmetrical, no masses  LUNG EXAM; Good breath sounds bilaterally. There are expiratory wheezes both lungs, there are crackles at both lung bases  CARDIOVASCULAR EXAM: Positive S1 and S2, no S3 or S4, no clicks ,no murmurs  RIGHT AND LEFT LOWER EXTRIMITY EXAM: No edema, no swelling, no inflamation            LABS   Lab Results   Component Value Date    WBC 10.5 02/04/2022    HGB 11.2 (L) 02/04/2022    HCT 32.5 (L) 02/04/2022    MCV 88.3 02/04/2022     (L) 02/04/2022     Lab Results   Component Value Date    CREATININE 2.0 (H) 02/04/2022    BUN 74 (H) 02/04/2022     02/04/2022    K 3.3 (L) 02/04/2022     (H) 02/04/2022    CO2 16 (L) 02/04/2022     Lab Results   Component Value Date    INR 5.10 (HH) 02/01/2022    PROTIME 66.9 (H) 02/01/2022          Lab Results   Component Value Date    PHOS 3.3 02/04/2022    PHOS 3.4 02/03/2022    PHOS 4.0 02/02/2022      No results for input(s): PH, PO2ART, FWR1MWU, HCO3, BEART, O2SAT in the last 72 hours.       Wt Readings from Last 3 Encounters:   02/04/22 161 lb 6 oz (73.2 kg)   03/14/17 200 lb (90.7 kg)   02/19/16 200 lb 6.4 oz (90.9 kg)               ASSESMENT  covid pneumonia        PLAN  1. cpm  2. o2 adm    2/4/2022  Dilan Solis MD, M.D.

## 2022-02-04 NOTE — PROGRESS NOTES
Daily Progress Note    Pt. Awake, more alert- able to answer questions appropriately  HR stable, NSR, BP stable  No CP, SOB stable     Covid PNA    On NC-trying to wean as able-but back up to 4 L today    On ABx    Tx. Per pulm and primary  Correct K     Tachycardia    Idioventricular rhythm noted on tele prior    Increased lopressor dose and appears rate is better today    Echo stable    May need stress test after d/t ectopy and rhythm change     On IVF for hypernatremia  Increase activity  Will follow     Echo -- Summary-1/22   Technically difficult examination due to uncooperative patient.   Left ventricular systolic function is hyperdynamic.   Ejection fraction is visually estimated >60%.   No significant valvular disease noted.   No evidence of any pericardial effusion.     PAST MEDICAL HISTORY:  Diabetes, dysphagia, dysphonia, hyperlipidemia,  hypertension, kidney failure, and osteoarthritis.     PAST SURGICAL HISTORY:  Cervical spine surgery, endoscopy, and  colonoscopy in the past.     ALLERGIES:  No known drug allergies.     FAMILY HISTORY:  Reviewed and noncontributory.     SOCIAL HISTORY:  Former smoker, quit in 2010. Valentin Fontana does not drink any  alcohol.     HOME MEDICATIONS:  Tegretol, glipizide, Lipitor 80 mg every night at  bedtime, lisinopril 10 mg daily, Neurontin, metformin, and OxyContin.       Objective:   /82   Pulse 90   Temp 98.5 °F (36.9 °C) (Oral)   Resp 28   Ht 6' (1.829 m)   Wt 161 lb 6 oz (73.2 kg)   SpO2 90%   BMI 21.89 kg/m²     Intake/Output Summary (Last 24 hours) at 2/4/2022 1032  Last data filed at 2/4/2022 0528  Gross per 24 hour   Intake --   Output 950 ml   Net -950 ml       Medications:   Scheduled Meds:   glipiZIDE  2.5 mg Oral QAM AC    enoxaparin  30 mg SubCUTAneous Daily    azithromycin  500 mg IntraVENous Q24H    pantoprazole  40 mg IntraVENous BID    gabapentin  300 mg Oral TID    carBAMazepine  100 mg Oral BID    oxyCODONE  5 mg Oral TID    baricitinib 2 mg Oral Daily    dexamethasone  10 mg IntraVENous Q12H    insulin glargine  15 Units SubCUTAneous Nightly    metoprolol tartrate  50 mg Oral BID    insulin lispro  0-12 Units SubCUTAneous TID WC    insulin lispro  0-12 Units SubCUTAneous 2 times per day    sodium chloride flush  5-40 mL IntraVENous 2 times per day    Vitamin D  2,000 Units Oral Daily    aspirin  81 mg Oral Daily    famotidine  10 mg Oral Q48H      Infusions:   dextrose 75 mL/hr at 02/04/22 0706      PRN Meds:  sodium chloride flush, ondansetron **OR** ondansetron, polyethylene glycol, acetaminophen **OR** acetaminophen       Physical Exam:  Vitals:    02/04/22 1000   BP: 113/82   Pulse: 90   Resp: 28   Temp: 98.5 °F (36.9 °C)   SpO2: 90%        General: AAO, NAD  Chest: Nontender  Cardiac: First and Second Heart Sounds are Normal, No Murmurs or Gallops noted  Lungs:Clear to auscultation and percussion. Abdomen: Soft, NT, ND, +BS  Extremities: No clubbing, no edema  Vascular:  Equal 2+ peripheral pulses. Lab Data:  CBC:   Recent Labs     02/02/22  1103 02/03/22  1004 02/04/22  0254   WBC 11.5* 11.8* 10.5   HGB 13.2* 12.7* 11.2*   HCT 39.1* 38.3* 32.5*   MCV 89.5 91.2 88.3    149 134*     BMP:   Recent Labs     02/02/22  1103 02/03/22  1004 02/04/22  0254   * 147* 145   K 3.3* 3.5 3.3*   * 113* 114*   CO2 17* 17* 16*   PHOS 4.0 3.4 3.3   BUN 92* 84* 74*   CREATININE 2.3* 2.1* 2.0*     LIVER PROFILE:   Recent Labs     02/02/22  1103 02/03/22  1004 02/04/22  0254   AST 45* 30 29   ALT 33 32 29   BILIDIR 0.2 0.2 0.2   BILITOT 0.8 0.5 0.4   ALKPHOS 79 74 72     PT/INR:   Recent Labs     02/01/22  1427   PROTIME 66.9*   INR 5.10*     APTT:   Recent Labs     02/01/22  1427   APTT 59.6*     BNP:  No results for input(s): BNP in the last 72 hours.       Assessment:  Patient Active Problem List    Diagnosis Date Noted    Acute kidney injury ([facility]) 01/28/2022       Electronically signed by Lori Ayers PA-C on 2/4/2022 at 10:32 AM    Electronically signed by Anton Payne MD on 2/4/22 at 11:33 AM EST

## 2022-02-04 NOTE — PLAN OF CARE
Nutrition Problem #1: Inadequate oral intake  Intervention: Food and/or Nutrient Delivery: Continue Current Diet,Start Oral Nutrition Supplement  Nutritional Goals: Pt will consume at least half of his meals and supplements

## 2022-02-04 NOTE — PROGRESS NOTES
Comprehensive Nutrition Assessment    Type and Reason for Visit:  Initial    Nutrition Recommendations/Plan:   · Continue current diet as per SLP  · Total assist with feeding, PO intakes appreciated  · Please offer high protein pudding/frozen oral nutrition supplements between meals  · If intake does not improve, consider need for NG tube feeding     Nutrition Assessment:  Less confused, more awake, alert yesterday noted. However, is often refusing to eat or consuming very little. Pt is a total feed due to generalized weakness. No wt loss noted over stay. Will begin oral supplements to optimize intake and continue to follow as high nutrition risk. Malnutrition Assessment:  Malnutrition Status:  Insufficient data    Context:  Acute Illness       Estimated Daily Nutrient Needs:  Energy (kcal):  3242-9829; Weight Used for Energy Requirements:  Current     Protein (g):  80-90; Weight Used for Protein Requirements:  Ideal        Fluid (ml/day):  1500; Method Used for Fluid Requirements:  1 ml/kcal      Nutrition Related Findings:  K 3.3, BUN 74, Cr 2, A1C 6, Glu 105-144      Wounds:  None       Current Nutrition Therapies:    ADULT DIET;  Dysphagia - Soft and Bite Sized; Mildly Thick (Nectar)    Anthropometric Measures:  · Height: 6' (182.9 cm)  · Current Body Weight: 161 lb 6 oz (73.2 kg)   · Admission Body Weight: 157 lb (71.2 kg) (ADELE)    · Usual Body Weight:  (ADELE)     · Ideal Body Weight: 178 lbs; % Ideal Body Weight 88.2 %   · BMI: 21.9  · BMI Categories: Underweight (BMI less than 22) age over 72       Nutrition Diagnosis:   · Inadequate oral intake related to acute injury/trauma as evidenced by intake 0-25%    Nutrition Interventions:   Food and/or Nutrient Delivery:  Continue Current Diet,Start Oral Nutrition Supplement  Nutrition Education/Counseling:  No recommendation at this time   Coordination of Nutrition Care:  Continue to monitor while inpatient,Feeding Assistance/Environment Change,Speech Therapy,Swallow Evaluation    Goals:  Pt will consume at least half of his meals and supplements       Nutrition Monitoring and Evaluation:   Behavioral-Environmental Outcomes:  None Identified   Food/Nutrient Intake Outcomes:  Food and Nutrient Intake,Supplement Intake  Physical Signs/Symptoms Outcomes:  Biochemical Data,Chewing or Swallowing,GI Status,Meal Time Behavior,Nutrition Focused Physical Findings,Weight     Discharge Planning:    Continue Oral Nutrition Supplement,Continue current diet     Electronically signed by Dougie Price RD, LD on 2/4/22 at 9:47 AM EST    Contact: [phone]

## 2022-02-04 NOTE — PLAN OF CARE
Problem: Airway Clearance - Ineffective  Goal: Achieve or maintain patent airway  2/3/2022 2119 by Nahun Murray RN  Outcome: Met This Shift  2/3/2022 1020 by Dieudonne Hickman RN  Outcome: Ongoing     Problem: Gas Exchange - Impaired  Goal: Absence of hypoxia  2/3/2022 2119 by Nahun Murray RN  Outcome: Met This Shift  2/3/2022 1020 by Dieudonne Hickman RN  Outcome: Ongoing  Goal: Promote optimal lung function  2/3/2022 2119 by Nahun Murray RN  Outcome: Met This Shift  2/3/2022 1020 by Dieudonne Hickman RN  Outcome: Ongoing     Problem: Breathing Pattern - Ineffective  Goal: Ability to achieve and maintain a regular respiratory rate  2/3/2022 2119 by Nahun Murray RN  Outcome: Met This Shift  2/3/2022 1020 by Dieudonne Hickman RN  Outcome: Ongoing     Problem:  Body Temperature -  Risk of, Imbalanced  Goal: Ability to maintain a body temperature within defined limits  2/3/2022 2119 by Nahun Murray RN  Outcome: Met This Shift  2/3/2022 1020 by Dieudonne Hickman RN  Outcome: Ongoing  Goal: Will regain or maintain usual level of consciousness  2/3/2022 2119 by Nahun Murray RN  Outcome: Met This Shift  2/3/2022 1020 by Dieudonne Hickman RN  Outcome: Ongoing  Goal: Complications related to the disease process, condition or treatment will be avoided or minimized  2/3/2022 2119 by Nahun Murray RN  Outcome: Met This Shift  2/3/2022 1020 by Dieudonne Hickman RN  Outcome: Ongoing     Problem: Isolation Precautions - Risk of Spread of Infection  Goal: Prevent transmission of infection  2/3/2022 2119 by Nahun Murray RN  Outcome: Met This Shift  2/3/2022 1020 by Dieudonne Hickman RN  Outcome: Ongoing     Problem: Nutrition Deficits  Goal: Optimize nutritional status  2/3/2022 2119 by Nahun Murray RN  Outcome: Met This Shift  2/3/2022 1020 by Dieudonne Hickman RN  Outcome: Ongoing     Problem: Risk for Fluid Volume Deficit  Goal: Maintain normal heart rhythm  2/3/2022 2119 by Nahun Murray RN  Outcome: Met This Shift  2/3/2022 1020 by Dieudonne Hickman RN  Outcome: Ongoing  Goal: Maintain absence of muscle cramping  2/3/2022 2119 by Nahun Murray RN  Outcome: Met This Shift  2/3/2022 1020 by Dieudonne Hickman RN  Outcome: Ongoing  Goal: Maintain normal serum potassium, sodium, calcium, phosphorus, and pH  2/3/2022 2119 by Nahun Murray RN  Outcome: Met This Shift  2/3/2022 1020 by Dieudonne Hickman RN  Outcome: Ongoing     Problem: Loneliness or Risk for Loneliness  Goal: Demonstrate positive use of time alone when socialization is not possible  2/3/2022 2119 by Nahun Murray RN  Outcome: Met This Shift  2/3/2022 1020 by Dieudonne Hickman RN  Outcome: Ongoing     Problem: Fatigue  Goal: Verbalize increase energy and improved vitality  2/3/2022 2119 by Nahun Murray RN  Outcome: Met This Shift  2/3/2022 1020 by Dieudonne Hickman RN  Outcome: Ongoing     Problem: Patient Education: Go to Patient Education Activity  Goal: Patient/Family Education  2/3/2022 2119 by Nahun Murray RN  Outcome: Met This Shift  2/3/2022 1020 by Dieudonne Hickman RN  Outcome: Ongoing     Problem: Falls - Risk of:  Goal: Will remain free from falls  Description: Will remain free from falls  2/3/2022 2119 by Nahun Murray RN  Outcome: Met This Shift  2/3/2022 1020 by Dieudonne Hickman RN  Outcome: Ongoing  Goal: Absence of physical injury  Description: Absence of physical injury  2/3/2022 2119 by Nahun Murrya RN  Outcome: Met This Shift  2/3/2022 1020 by Dieudonne Hickman RN  Outcome: Ongoing     Problem: Skin Integrity:  Goal: Will show no infection signs and symptoms  Description: Will show no infection signs and symptoms  2/3/2022 2119 by Nahun Murray RN  Outcome: Met This Shift  2/3/2022 1020 by Dieudonne Hickman RN  Outcome: Ongoing  Goal: Absence of new skin breakdown  Description: Absence of new skin breakdown  2/3/2022 2119 by Nahun Murray, RN  Outcome: Met This Shift  2/3/2022 1020 by Love Alvarez RN  Outcome: Ongoing     Problem: Pain:  Goal: Pain level will decrease  Description: Pain level will decrease  Outcome: Met This Shift  Goal: Control of acute pain  Description: Control of acute pain  Outcome: Met This Shift  Goal: Control of chronic pain  Description: Control of chronic pain  Outcome: Met This Shift

## 2022-02-04 NOTE — PROGRESS NOTES
Nephrology Progress Note  2/4/2022 5:30 PM        Subjective:   Admit Date: 1/28/2022  PCP: Mami Kemp MD    Interval History: Patient seen early morning, on sildenafil    Diet: Some    ROS: No overt shortness of breath or confusion  With D5 water for hypoglycemia  Urine output recorded 950 cc for the last 24 hours  Afebrile    Data:     Current meds:    potassium chloride  10 mEq Oral BID    glipiZIDE  2.5 mg Oral QAM AC    enoxaparin  30 mg SubCUTAneous Daily    azithromycin  500 mg IntraVENous Q24H    pantoprazole  40 mg IntraVENous BID    gabapentin  300 mg Oral TID    carBAMazepine  100 mg Oral BID    oxyCODONE  5 mg Oral TID    baricitinib  2 mg Oral Daily    dexamethasone  10 mg IntraVENous Q12H    insulin glargine  15 Units SubCUTAneous Nightly    metoprolol tartrate  50 mg Oral BID    insulin lispro  0-12 Units SubCUTAneous TID WC    insulin lispro  0-12 Units SubCUTAneous 2 times per day    sodium chloride flush  5-40 mL IntraVENous 2 times per day    Vitamin D  2,000 Units Oral Daily    aspirin  81 mg Oral Daily    famotidine  10 mg Oral Q48H      dextrose 5 % with KCl 20 mEq 50 mL/hr at 02/04/22 1347         I/O last 3 completed shifts:   In: 10 [I.V.:10]  Out: 950 [Urine:950]    CBC:   Recent Labs     02/02/22  1103 02/03/22  1004 02/04/22  0254   WBC 11.5* 11.8* 10.5   HGB 13.2* 12.7* 11.2*    149 134*          Recent Labs     02/02/22  1103 02/03/22  1004 02/04/22  0254   * 147* 145   K 3.3* 3.5 3.3*   * 113* 114*   CO2 17* 17* 16*   BUN 92* 84* 74*   CREATININE 2.3* 2.1* 2.0*   GLUCOSE 212* 163* 154*       Lab Results   Component Value Date    CALCIUM 7.8 (L) 02/04/2022    PHOS 3.3 02/04/2022       Objective:     Vitals: /67   Pulse 71   Temp 98.8 °F (37.1 °C) (Oral)   Resp 20   Ht 6' (1.829 m)   Wt 161 lb 6 oz (73.2 kg)   SpO2 (!) 88%   BMI 21.89 kg/m²     General appearance: Alert and awake  HEENT: No gross conjunctival pallor  Neck: Supple  Lungs: Positive rhonchi  Heart: Regular rate and rhythm  Abdomen: Soft  Extremities: No edema      Problem List :         Impression :     1. Acute kidney injury with underlying CKD stage III-nonoliguric-peak creatinine was 3.1 in January 28, 2022-seems to be recovering by creatinine criteria-from presumed toxic and ischemic acute tubular injury-UA on January 31 has some albuminuria,  RBC-roughly 900mg/day of proteinuria-likely from tubular injury-she has metabolic acidosis and hypokalemia likely from D5 infusion COVID-19 with pneumonitis as well as multiorgan dysfunction including encephalopathy--and dysrhythmia as well as aftermath of it  2. Chronic diabetes, dyslipidemia hypertension    Recommendation/Plan  :     1. We will discontinue D5 water tomorrow  2. We will redo the evening urinary indicis  3. Replete potassium  4. Good glycemic control  5. Watch for iatrogenic nosocomial complication  6. Also with acute kidney injury eliminator lower the gabapentin level to reduce the toxicity  7.  Follow clinically and biochemically      Jcarlos Olvera MD MD

## 2022-02-04 NOTE — PROGRESS NOTES
Physical Therapy    Physical Therapy Treatment Note  Name: Cornell Pederson MRN: [id number] :   1945   Date:  2022   Admission Date: 2022 Room:  [address] [place] [address]   Restrictions/Precautions:        droplet plus   Communication with other providers:  [place]   Subjective:  Patient states:  \"I feel fine\"   Pain:   Location, Type, Intensity (0/10 to 10/10): Denies   Objective:    Observation:    Supine in bed. Quiet overall though improved conversation as session progressed. Oriented to person, year, and city. Disoriented to place and situation. SpO2 variable with questionable reading on pulse ox. Tried for pulse ox on various fingers/toes as well as 2 different pulse oximeters. Ranged from 75-94% on 4-11L. Left on 4L at end of session at 93%. Cued for PLB. NC placed in pts mouth at one point due to pt being a heavy mouth breather   Treatment, including education/measures:  Supine to sit: maxA x 2 for bilat LE advancement, hip scooting, and uprighting trunk. Dec command following with verbal cues. All limbs and trunk very rigid   Sitting balance: Variable from maxA with heavy posterior lean progressing to CGA at EOB. Rounded shoulder with downward gaze. BUE support on bed. X ~15 minutes    Sit to stand: modA x 2 from EOB with hand over hand assist for keeping single UE support on walker and pushing/transitioning opposite UE from bed to walker   Stand to sit: modA for controlling sit to recliner with hand over hand guidance to reach back to seat surface. Multiple cues for bending knees to sit.   step pivot: min-modA x 2 for safety and steadying along with navigating standard walker. Constant cues for sequencing. Unsteadiness with narrowed CARLOS and downward gaze throughout. Educated pt on POC, role of PT, DME use, discharge.  Cues provided for sequencing to inc safety and indep with mobility   Assessment / Impression:    Patient's tolerance of treatment:  Good   Adverse Reaction: drop in SPO2 (questionable

## 2022-02-04 NOTE — PROGRESS NOTES
Hospitalist Progress Note      Name:  Alexander Bound /Age/Sex: 1945  (64 y.o. male)   MRN & CSN:  [id number] & [id number] Admission Date/Time: 2022  1:32 PM   Location:  7006/8486-X PCP: Janey Chin MD         Hospital Day: 8      Assessment and Plan:     Patient is 26-year-old male past medical history of chronic pain syndrome, chronic oxycodone use, diabetes, hypertension, hyperlipidemia who was admitted for confusion, generalized weakness. Patient was found to have COVID-19 pneumonia. Patient continues to require baricitinib, dexamethasone and antibiotics. Patient has improved and currently requiring 2 days 3 L nasal cannula. Patient is now medically stable for discharge awaiting placement. #.  Acute hypoxic respiratory failure due to viral pneumonia from COVID-19  #. Viral pneumonia from COVID-19  Patient oxygen requirement has significantly improved 2 L. Continue bronchodilators and wean of oxygen as tolerated  Continue dexamethasone to 20 mg a day  Continue baricitinib  Continue antibiotics     #. Encephalopathy, likely metabolic  #. Hyponatremia likely hypovolemic  #. Acute kidney injury likely due to ATN likely due to prerenal azotemia  Suspect metabolic encephalopathy: Due to combination of hyponatremia, COVID-19 pneumonia and hypoxia  Discontinue restraints  Continue to reorient patient  Maintain normal wake sleep cycle  Nephrology following for hypernatremia, acute kidney injury. #.  Sinus tachycardia/junctional tachycardia  Cardiology following  Continue metoprolol     #. Generalized weakness and physical deconditioning  PT/OT  ? Rehab once medically stable for discharge     #. Other medical problem include hypertension, hyperlipidemia, diabetes mellitus, type with hyperglycemia  Continue current medications        DVT prophylaxis: Lovenox  CODE STATUS: Total support    Subjective. :     Patient was seen and examined today. No acute events overnight.   Patient bossman afebrile with stable vital signs. Continues to require 2 to 3 L nasal cannula. Objective:   Vitals:   Vitals:    02/04/22 1000   BP: 113/82   Pulse: 90   Resp: 28   Temp: 98.5 °F (36.9 °C)   SpO2: 90%         Physical Exam:   GEN: Awake, alert, in no apparent distress awake male, sitting upright in bed in no apparent distress. Appears given age. HEENT: Atraumatic, normocephalic, PERRLA, EOMI  NECK: Supple, no apparent thyromegaly or masses. RESP: Diminished breath sounds bilaterally. CARDIO/VASC: Regular rate and rhythm, normal S1, S2, no murmurs  GI: Abdomen is soft, nontender, no significant organomegaly noted, bowel sounds present  MSK: No gross joint deformities.   Skin: No significant rashes, skin lesions noted  Neuro: AOx2, cranial nerves grossly intact, no focal motor or sensory deficits   PSYCH: Affect appropriate    Medications:   Medications:    glipiZIDE  2.5 mg Oral QAM AC    enoxaparin  30 mg SubCUTAneous Daily    azithromycin  500 mg IntraVENous Q24H    pantoprazole  40 mg IntraVENous BID    gabapentin  300 mg Oral TID    carBAMazepine  100 mg Oral BID    oxyCODONE  5 mg Oral TID    baricitinib  2 mg Oral Daily    dexamethasone  10 mg IntraVENous Q12H    insulin glargine  15 Units SubCUTAneous Nightly    metoprolol tartrate  50 mg Oral BID    insulin lispro  0-12 Units SubCUTAneous TID     insulin lispro  0-12 Units SubCUTAneous 2 times per day    sodium chloride flush  5-40 mL IntraVENous 2 times per day    Vitamin D  2,000 Units Oral Daily    aspirin  81 mg Oral Daily    famotidine  10 mg Oral Q48H      Infusions:    dextrose 75 mL/hr at 02/04/22 0706     PRN Meds: sodium chloride flush, 5-40 mL, PRN  ondansetron, 4 mg, Q8H PRN   Or  ondansetron, 4 mg, Q6H PRN  polyethylene glycol, 17 g, Daily PRN  acetaminophen, 650 mg, Q6H PRN   Or  acetaminophen, 650 mg, Q6H PRN          Electronically signed by Reed Sharma MD on 2/4/2022 at 11:21 AM

## 2022-02-04 NOTE — PROGRESS NOTES
Occupational Therapy  . Occupational Therapy Treatment Note      Name: Aggie Glover MRN: [id number] :   1945   Date:  2022   Admission Date: 2022 Room:  [address] [place] [address]-A     Primary Problem:      Restrictions/Precautions:    General precautions, fall risk    Droplet plus    Communication with other providers:  cotx with PT Olamide for safety and assisit. Subjective:  Patient states:  - repeated over and over. Pain: none stated (location, type, intensity)    Objective:    Observation:  patient in semi fowlers, asleep. Breakfast tray untouched in front of patient. patient more aroused after toileting and diaper change. Objective Measures:  02- 4L on arrival- patien at 90%. During movement and EOB desat to 77% increased 02- 10L for rebound. returned to 4L when finished. Treatment, including education:    ADL activity training was instructed today. Cues were given for safety, sequence, UE/LE placement, visual cues, and balance. Activities performed today included dressing, toileting, hand hygiene, and grooming. Feeding- DEP/SET UP to feed, patient states he can feed self when asked. Patient left with lunch tray set up.   toileting- DEP for  incontanent BM. Therapeutic activity training was instructed today. Cues were given for safety, sequence, UE/LE placement, awareness, and balance. Activities performed today included bed mobility training, sup-sit, sit-stand, SPT. Rolling- Mod A  Semi fowlers to EOB- Max x2. Sitting balance- Initially Max A, progressed to Min A/CGA with cues. emilytent tolerated EOb sitting x15 min. Stand to [address] [place] [address] x2. With cues. HHA to place B hands properly. SPT- Mod x2 plus cues for safety and sequencing. Max A for walker negotiation. Sit to recliner- Mod x2- HHA to place hands on recliner and cues to sit. Patient educated on role of OT , benefits of OT and rationale for therapeutic intervention.      All therapeutic intervention performed c emphasis on dynamic balance / standing tolerance to increase strength, endurance and activity tolerance for increased Idanha c ADL tasks and func transfers / mobility. Patient left safely in bedside chair at end of session, with call light in reach, alarm on and nursing aware. Gait belt was used for func transfers / mobility.       Assessment / Impression:    Patient's tolerance of treatment: fair  Adverse Reaction: none  Significant change in status and impact:  none  Barriers to improvement: weakness, confusion      Plan for Next Session:    Continue with OT POC      Time in:  1138  Time out:  1212  Timed treatment minutes:  34  Total treatment time:  34      Electronically signed by:    Saurabh Mendez [address], [place] [address]    2/4/2022, 1:07 PM

## 2022-02-05 ENCOUNTER — APPOINTMENT (OUTPATIENT)
Dept: CT IMAGING | Age: 77
DRG: 177 | End: 2022-02-05
Payer: MEDICARE

## 2022-02-05 ENCOUNTER — APPOINTMENT (OUTPATIENT)
Dept: GENERAL RADIOLOGY | Age: 77
DRG: 177 | End: 2022-02-05
Payer: MEDICARE

## 2022-02-05 LAB
ALBUMIN SERPL-MCNC: 3.1 GM/DL (ref 3.4–5)
ALP BLD-CCNC: 90 IU/L (ref 40–128)
ALT SERPL-CCNC: 29 U/L (ref 10–40)
ANION GAP SERPL CALCULATED.3IONS-SCNC: 12 MMOL/L (ref 4–16)
AST SERPL-CCNC: 28 IU/L (ref 15–37)
BACTERIA: ABNORMAL /HPF
BASE EXCESS: 11 (ref 0–3.3)
BILIRUB SERPL-MCNC: 0.6 MG/DL (ref 0–1)
BILIRUBIN URINE: NEGATIVE MG/DL
BLOOD, URINE: ABNORMAL
BUN BLDV-MCNC: 61 MG/DL (ref 6–23)
CALCIUM OXALATE CRYSTALS: ABNORMAL /HPF
CALCIUM SERPL-MCNC: 8.5 MG/DL (ref 8.3–10.6)
CARBON MONOXIDE, BLOOD: 2.2 % (ref 0–5)
CHLORIDE BLD-SCNC: 114 MMOL/L (ref 99–110)
CLARITY: CLEAR
CO2 CONTENT: 11.3 MMOL/L (ref 19–24)
CO2: 18 MMOL/L (ref 21–32)
COLOR: YELLOW
COMMENT: ABNORMAL
CREAT SERPL-MCNC: 1.9 MG/DL (ref 0.9–1.3)
CREATININE URINE: 84.6 MG/DL (ref 39–259)
GFR AFRICAN AMERICAN: 42 ML/MIN/1.73M2
GFR NON-AFRICAN AMERICAN: 34 ML/MIN/1.73M2
GLUCOSE BLD-MCNC: 102 MG/DL (ref 70–99)
GLUCOSE BLD-MCNC: 146 MG/DL (ref 70–99)
GLUCOSE BLD-MCNC: 168 MG/DL (ref 70–99)
GLUCOSE BLD-MCNC: 174 MG/DL (ref 70–99)
GLUCOSE BLD-MCNC: 352 MG/DL (ref 70–99)
GLUCOSE, URINE: NEGATIVE MG/DL
HCO3 ARTERIAL: 10.8 MMOL/L (ref 18–23)
HCT VFR BLD CALC: 40.2 % (ref 42–52)
HEMOGLOBIN: 13.9 GM/DL (ref 13.5–18)
KETONES, URINE: NEGATIVE MG/DL
LEUKOCYTE ESTERASE, URINE: NEGATIVE
MAGNESIUM: 2.2 MG/DL (ref 1.8–2.4)
MCH RBC QN AUTO: 31 PG (ref 27–31)
MCHC RBC AUTO-ENTMCNC: 34.6 % (ref 32–36)
MCV RBC AUTO: 89.7 FL (ref 78–100)
METHEMOGLOBIN ARTERIAL: 1.4 %
NITRITE URINE, QUANTITATIVE: NEGATIVE
O2 SATURATION: 74.6 % (ref 96–97)
PCO2 ARTERIAL: 17 MMHG (ref 32–45)
PDW BLD-RTO: 13.9 % (ref 11.7–14.9)
PH BLOOD: 7.41 (ref 7.34–7.45)
PH, URINE: 5.5 (ref 5–8)
PHOSPHORUS: 2.6 MG/DL (ref 2.5–4.9)
PLATELET # BLD: 165 K/CU MM (ref 140–440)
PMV BLD AUTO: 11.3 FL (ref 7.5–11.1)
PO2 ARTERIAL: 39 MMHG (ref 75–100)
POTASSIUM SERPL-SCNC: 4.3 MMOL/L (ref 3.5–5.1)
PROT/CREAT RATIO, UR: 0.5
PROTEIN UA: ABNORMAL MG/DL
RBC # BLD: 4.48 M/CU MM (ref 4.6–6.2)
RBC URINE: <1 /HPF (ref 0–3)
SODIUM BLD-SCNC: 144 MMOL/L (ref 135–145)
SPECIFIC GRAVITY UA: 1.02 (ref 1–1.03)
SQUAMOUS EPITHELIAL: <1 /HPF
TOTAL PROTEIN: 6.1 GM/DL (ref 6.4–8.2)
URIC ACID CRYSTALS: ABNORMAL /HPF
URINE TOTAL PROTEIN: 38.4 MG/DL
UROBILINOGEN, URINE: 0.2 MG/DL (ref 0.2–1)
WBC # BLD: 11.7 K/CU MM (ref 4–10.5)
WBC UA: 2 /HPF (ref 0–2)

## 2022-02-05 PROCEDURE — 1200000000 HC SEMI PRIVATE

## 2022-02-05 PROCEDURE — 2580000003 HC RX 258: Performed by: FAMILY MEDICINE

## 2022-02-05 PROCEDURE — 6360000002 HC RX W HCPCS: Performed by: HOSPITALIST

## 2022-02-05 PROCEDURE — 6370000000 HC RX 637 (ALT 250 FOR IP): Performed by: INTERNAL MEDICINE

## 2022-02-05 PROCEDURE — 85027 COMPLETE CBC AUTOMATED: CPT

## 2022-02-05 PROCEDURE — C9113 INJ PANTOPRAZOLE SODIUM, VIA: HCPCS | Performed by: HOSPITALIST

## 2022-02-05 PROCEDURE — 2580000003 HC RX 258: Performed by: HOSPITALIST

## 2022-02-05 PROCEDURE — 84100 ASSAY OF PHOSPHORUS: CPT

## 2022-02-05 PROCEDURE — 71045 X-RAY EXAM CHEST 1 VIEW: CPT

## 2022-02-05 PROCEDURE — 83735 ASSAY OF MAGNESIUM: CPT

## 2022-02-05 PROCEDURE — 70450 CT HEAD/BRAIN W/O DYE: CPT

## 2022-02-05 PROCEDURE — 2700000000 HC OXYGEN THERAPY PER DAY

## 2022-02-05 PROCEDURE — 80053 COMPREHEN METABOLIC PANEL: CPT

## 2022-02-05 PROCEDURE — 36600 WITHDRAWAL OF ARTERIAL BLOOD: CPT

## 2022-02-05 PROCEDURE — 36415 COLL VENOUS BLD VENIPUNCTURE: CPT

## 2022-02-05 PROCEDURE — 6360000002 HC RX W HCPCS: Performed by: INTERNAL MEDICINE

## 2022-02-05 PROCEDURE — 82962 GLUCOSE BLOOD TEST: CPT

## 2022-02-05 PROCEDURE — 82803 BLOOD GASES ANY COMBINATION: CPT

## 2022-02-05 PROCEDURE — 94761 N-INVAS EAR/PLS OXIMETRY MLT: CPT

## 2022-02-05 PROCEDURE — 94640 AIRWAY INHALATION TREATMENT: CPT

## 2022-02-05 RX ORDER — DEXTROSE MONOHYDRATE 50 MG/ML
100 INJECTION, SOLUTION INTRAVENOUS PRN
Status: DISCONTINUED | OUTPATIENT
Start: 2022-02-05 | End: 2022-02-19 | Stop reason: HOSPADM

## 2022-02-05 RX ORDER — DEXTROSE MONOHYDRATE 25 G/50ML
12.5 INJECTION, SOLUTION INTRAVENOUS PRN
Status: DISCONTINUED | OUTPATIENT
Start: 2022-02-05 | End: 2022-02-05 | Stop reason: RX

## 2022-02-05 RX ORDER — NICOTINE POLACRILEX 4 MG
15 LOZENGE BUCCAL PRN
Status: DISCONTINUED | OUTPATIENT
Start: 2022-02-05 | End: 2022-02-19 | Stop reason: HOSPADM

## 2022-02-05 RX ORDER — ALBUTEROL SULFATE 90 UG/1
2 AEROSOL, METERED RESPIRATORY (INHALATION) 4 TIMES DAILY
Status: DISCONTINUED | OUTPATIENT
Start: 2022-02-05 | End: 2022-02-16

## 2022-02-05 RX ORDER — OXYCODONE HYDROCHLORIDE 5 MG/1
2.5 TABLET ORAL 3 TIMES DAILY
Status: DISCONTINUED | OUTPATIENT
Start: 2022-02-05 | End: 2022-02-10

## 2022-02-05 RX ORDER — INSULIN GLARGINE 100 [IU]/ML
10 INJECTION, SOLUTION SUBCUTANEOUS NIGHTLY
Status: DISCONTINUED | OUTPATIENT
Start: 2022-02-05 | End: 2022-02-11

## 2022-02-05 RX ADMIN — ALBUTEROL SULFATE 2 PUFF: 90 AEROSOL, METERED RESPIRATORY (INHALATION) at 16:07

## 2022-02-05 RX ADMIN — ALBUTEROL SULFATE 2 PUFF: 90 AEROSOL, METERED RESPIRATORY (INHALATION) at 18:32

## 2022-02-05 RX ADMIN — IPRATROPIUM BROMIDE 2 PUFF: 17 AEROSOL, METERED RESPIRATORY (INHALATION) at 16:07

## 2022-02-05 RX ADMIN — POTASSIUM CHLORIDE 10 MEQ: 20 TABLET, EXTENDED RELEASE ORAL at 20:58

## 2022-02-05 RX ADMIN — IPRATROPIUM BROMIDE 2 PUFF: 17 AEROSOL, METERED RESPIRATORY (INHALATION) at 18:32

## 2022-02-05 RX ADMIN — DEXAMETHASONE SODIUM PHOSPHATE 10 MG: 10 INJECTION INTRAMUSCULAR; INTRAVENOUS at 21:45

## 2022-02-05 RX ADMIN — PANTOPRAZOLE SODIUM 40 MG: 40 INJECTION, POWDER, FOR SOLUTION INTRAVENOUS at 20:58

## 2022-02-05 RX ADMIN — GABAPENTIN 300 MG: 300 CAPSULE ORAL at 20:58

## 2022-02-05 RX ADMIN — SODIUM CHLORIDE, PRESERVATIVE FREE 10 ML: 5 INJECTION INTRAVENOUS at 21:45

## 2022-02-05 RX ADMIN — POTASSIUM CHLORIDE AND DEXTROSE MONOHYDRATE: 150; 5 INJECTION, SOLUTION INTRAVENOUS at 11:24

## 2022-02-05 RX ADMIN — DEXAMETHASONE SODIUM PHOSPHATE 10 MG: 10 INJECTION INTRAMUSCULAR; INTRAVENOUS at 11:22

## 2022-02-05 RX ADMIN — OXYCODONE HYDROCHLORIDE 2.5 MG: 5 TABLET ORAL at 20:58

## 2022-02-05 RX ADMIN — METOPROLOL TARTRATE 50 MG: 50 TABLET, FILM COATED ORAL at 21:43

## 2022-02-05 RX ADMIN — AZITHROMYCIN MONOHYDRATE 500 MG: 500 INJECTION, POWDER, LYOPHILIZED, FOR SOLUTION INTRAVENOUS at 11:25

## 2022-02-05 RX ADMIN — CARBAMAZEPINE 100 MG: 100 TABLET, EXTENDED RELEASE ORAL at 21:43

## 2022-02-05 RX ADMIN — INSULIN GLARGINE 10 UNITS: 100 INJECTION, SOLUTION SUBCUTANEOUS at 21:43

## 2022-02-05 ASSESSMENT — PAIN SCALES - WONG BAKER: WONGBAKER_NUMERICALRESPONSE: 0

## 2022-02-05 ASSESSMENT — PAIN SCALES - GENERAL
PAINLEVEL_OUTOF10: 0

## 2022-02-05 NOTE — PROGRESS NOTES
Hospitalist Progress Note      Name:  Raquel Peng /Age/Sex: 1945  (64 y.o. male)   MRN & CSN:  [id number] & [id number] Admission Date/Time: 2022  1:32 PM   Location:  9480/7762- PCP: Charles Wiggins MD       Hospital Day: 9    Assessment and Plan:     Patient is 68-year-old male past medical history of chronic pain syndrome, chronic oxycodone use, diabetes, hypertension, hyperlipidemia who was admitted for confusion, generalized weakness. Patient was found to have COVID-19 pneumonia. Patient continues to require oxygen. He is on  baricitinib, dexamethasone and antibiotics. Patient worsened overnight and is on 14 L NRB. He had unwitnessed fall last night. #.  Acute hypoxic respiratory failure due to viral pneumonia from COVID-19  #. Viral pneumonia from COVID-19  Patient oxygen requirement has significantly worsened-15 L but now down to 10 L  Continue bronchodilators and wean of oxygen as tolerated  Continue dexamethasone to 20 mg a day  Continue baricitinib  Continue antibiotics  Repeat CXR today-Bilateral hazy parenchymal opacities greater on the left compatible with   pneumonia. Pulm following and recommends step down unit. Monitor. #.  Encephalopathy, likely metabolic  #. Hyponatremia likely hypovolemic  Suspect metabolic encephalopathy: Due to combination of hyponatremia, COVID-19 pneumonia and hypoxia  Discontinue restraints  Continue to reorient patient  Sitter ordered as he fell last night. Maintain normal wake sleep cycle  Decrease scheduled oxycodone from 5 to 2.5 mg tid given MARCO. CT head 22 neg. #.  Acute kidney injury likely due to ATN likely due to prerenal azotemia  Nephrology following for hypernatremia, acute kidney injury. Sodium now normal and cr slowly improving. #.  Sinus tachycardia/junctional tachycardia  Cardiology following  Continue metoprolol. Improved. #.  Generalized weakness and physical deconditioning  PT/OT  ?   Rehab once medically stable for discharge     #. Other medical problems include hypertension, hyperlipidemia, diabetes mellitus, type with hyperglycemia  Continue current medications        DVT prophylaxis: Lovenox  CODE STATUS: Total support    Subjective. :     Patient was seen and examined today. Confused. Jerica Pacini last night and with increased oxygen requirement. Objective:   Vitals:   Vitals:    02/05/22 1609   BP:    Pulse:    Resp: 20   Temp:    SpO2: 93%         Physical Exam:   GEN: Awake, alert, in no apparent distress awake male, sitting upright in bed in no apparent distress. Appears given age. HEENT: Atraumatic, normocephalic, PERRLA, EOMI  NECK: Supple, no apparent thyromegaly or masses. RESP: Diminished breath sounds bilaterally. CARDIO/VASC:Regular rate and rhythm, normal S1, S2, no murmurs  GI: Abdomen is soft, nontender, no significant organomegaly noted, bowel sounds present  MSK: No gross joint deformities.   Skin: No significant rashes, skin lesions noted  Neuro: AOx2, cranial nerves grossly intact, no focal motor or sensory deficits   PSYCH: Affect appropriate    Medications:   Medications:    insulin glargine  10 Units SubCUTAneous Nightly    albuterol sulfate HFA  2 puff Inhalation 4x daily    ipratropium  2 puff Inhalation 4x daily    potassium chloride  10 mEq Oral BID    glipiZIDE  2.5 mg Oral QAM AC    enoxaparin  30 mg SubCUTAneous Daily    azithromycin  500 mg IntraVENous Q24H    pantoprazole  40 mg IntraVENous BID    gabapentin  300 mg Oral TID    carBAMazepine  100 mg Oral BID    oxyCODONE  5 mg Oral TID    baricitinib  2 mg Oral Daily    dexamethasone  10 mg IntraVENous Q12H    metoprolol tartrate  50 mg Oral BID    insulin lispro  0-12 Units SubCUTAneous TID     insulin lispro  0-12 Units SubCUTAneous 2 times per day    sodium chloride flush  5-40 mL IntraVENous 2 times per day    Vitamin D  2,000 Units Oral Daily    aspirin  81 mg Oral Daily    famotidine  10 mg Oral Q48H      Infusions:    dextrose      dextrose 5 % with KCl 20 mEq 50 mL/hr at 02/05/22 1124     PRN Meds: glucose, 15 g, PRN  glucagon (rDNA), 1 mg, PRN  dextrose, 100 mL/hr, PRN  dextrose bolus (hypoglycemia), 125 mL, PRN   Or  dextrose bolus (hypoglycemia), 250 mL, PRN  sodium chloride flush, 5-40 mL, PRN  ondansetron, 4 mg, Q8H PRN   Or  ondansetron, 4 mg, Q6H PRN  polyethylene glycol, 17 g, Daily PRN  acetaminophen, 650 mg, Q6H PRN   Or  acetaminophen, 650 mg, Q6H PRN          Electronically signed by Monique Galdamez MD on 2/5/2022 at 4:29 PM

## 2022-02-05 NOTE — PROGRESS NOTES
Nephrology Progress Note  2/5/2022 4:57 PM        Subjective:   Admit Date: 1/28/2022  PCP: Melina Carrero MD    Interval History: Very lethargic during my exam    Diet: Probably none    ROS: Lethargic, did not respond to verbal stimuli  Recorded only 550 cc of urine for the last 24 hours  No fever    Data:     Current meds:    insulin glargine  10 Units SubCUTAneous Nightly    albuterol sulfate HFA  2 puff Inhalation 4x daily    ipratropium  2 puff Inhalation 4x daily    oxyCODONE  2.5 mg Oral TID    potassium chloride  10 mEq Oral BID    glipiZIDE  2.5 mg Oral QAM AC    enoxaparin  30 mg SubCUTAneous Daily    azithromycin  500 mg IntraVENous Q24H    pantoprazole  40 mg IntraVENous BID    gabapentin  300 mg Oral TID    carBAMazepine  100 mg Oral BID    baricitinib  2 mg Oral Daily    dexamethasone  10 mg IntraVENous Q12H    metoprolol tartrate  50 mg Oral BID    insulin lispro  0-12 Units SubCUTAneous TID WC    insulin lispro  0-12 Units SubCUTAneous 2 times per day    sodium chloride flush  5-40 mL IntraVENous 2 times per day    Vitamin D  2,000 Units Oral Daily    aspirin  81 mg Oral Daily    famotidine  10 mg Oral Q48H      dextrose      dextrose 5 % with KCl 20 mEq 50 mL/hr at 02/05/22 1124         I/O last 3 completed shifts:   In: 120 [P.O.:120]  Out: 1150 [Urine:1150]    CBC:   Recent Labs     02/03/22  1004 02/04/22  0254 02/05/22  0606   WBC 11.8* 10.5 11.7*   HGB 12.7* 11.2* 13.9    134* 165          Recent Labs     02/03/22  1004 02/04/22  0254 02/05/22  0606   * 145 144   K 3.5 3.3* 4.3   * 114* 114*   CO2 17* 16* 18*   BUN 84* 74* 61*   CREATININE 2.1* 2.0* 1.9*   GLUCOSE 163* 154* 146*       Lab Results   Component Value Date    CALCIUM 8.5 02/05/2022    PHOS 2.6 02/05/2022       Objective:     Vitals: BP (!) 156/80   Pulse 113   Temp 98.9 °F (37.2 °C) (Oral)   Resp 20   Ht 6' (1.829 m)   Wt 161 lb (73 kg)   SpO2 93%   BMI 21.84 kg/m²     General appearance: Lethargic  HEENT: Positive conjunctival pallor  Neck: Supple  Lungs: Positive admission breath sound  Heart: Regular rate and rhythm with systolic ejection murmur  Abdomen: Soft, nontender  Extremities: No overt edema      Problem List :         Impression :     1. Acute kidney injury on top of CKD stage III-nonoliguric but low urine output-recovering by creatinine criteria-is repeat urine is 100% bland and has no proteinuria now  2. Metabolic acidosis improving presumably from acute kidney injury  3. COVID-19 with multiorgan dysfunction  4. Underlying diabetes but he is with D5    Recommendation/Plan  :     1. Watch for fluid status  2. Watch for iatrogenic nosocomial complication  3. Follow clinically and biochemically  4.  He remains in risk for multiorgan dysfunction      Jcarlos Olvera MD MD

## 2022-02-05 NOTE — PROGRESS NOTES
Pulmonary and Critical Care  Progress Note    Subjective: The patient is pleasantly confused. Requiring more O2. Shortness of breath has worsened  Chest pain none  Addressing respiratory complaints Patient is negative for  hemoptysis and cyanosis  CONSTITUTIONAL:  negative for fevers and chills      Past Medical History:     has a past medical history of DM (diabetes mellitus) ([address] Dysphagia, Dysphonia, Hyperlipidemia, Hypertension, Kidney failure, and Osteoarthritis. has a past surgical history that includes Cervical spine surgery and Endoscopy, colon, diagnostic (2/19/2016). reports that he quit smoking about 12 years ago. He quit after 50.00 years of use. He does not have any smokeless tobacco history on file. He reports that he does not drink alcohol and does not use drugs. Family history:  family history includes Cancer (age of onset: 61) in his sister; Cancer (age of onset: 68) in his father. No Known Allergies  Social History:    Reviewed; no changes    Objective:   PHYSICAL EXAM:        VITALS:  BP (!) 156/80   Pulse 113   Temp 98.9 °F (37.2 °C) (Oral)   Resp 29   Ht 6' (1.829 m)   Wt 161 lb (73 kg)   SpO2 100%   BMI 21.84 kg/m²     24HR INTAKE/OUTPUT:    Intake/Output Summary (Last 24 hours) at 2/5/2022 1244  Last data filed at 2/5/2022 0133  Gross per 24 hour   Intake 120 ml   Output 550 ml   Net -430 ml       CONSTITUTIONAL:  Pleasantly confused. LUNGS:  decreased breath sounds, basilar crackles. CARDIOVASCULAR:  normal S1 and S2 and negative JVD  ABD:Abdomen soft, non-tender. BS normal. No masses,  No organomegaly  NEURO:Pleasantly confused.   DATA:    CBC:  Recent Labs     02/03/22  1004 02/04/22  0254 02/05/22  0606   WBC 11.8* 10.5 11.7*   RBC 4.20* 3.68* 4.48*   HGB 12.7* 11.2* 13.9   HCT 38.3* 32.5* 40.2*    134* 165   MCV 91.2 88.3 89.7   MCH 30.2 30.4 31.0   MCHC 33.2 34.5 34.6   RDW 13.9 13.7 13.9      BMP:  Recent Labs     02/03/22  1004 02/04/22  0254 02/05/22  0606 * 145 144   K 3.5 3.3* 4.3   * 114* 114*   CO2 17* 16* 18*   BUN 84* 74* 61*   CREATININE 2.1* 2.0* 1.9*   CALCIUM 8.2* 7.8* 8.5   GLUCOSE 163* 154* 146*      ABG:  Recent Labs     02/05/22  0945   PH 7.41   PO2ART 39*   [id number] 17.0*   O2SAT 74.6*     Lab Results   Component Value Date    PROBNP 614.7 (H) 01/28/2022    PROBNP 272.0 03/14/2017     No results found for: CULTRESP    Radiology Review:  Pertinent images / reports were reviewed as a part of this visit. Assessment:     Patient Active Problem List   Diagnosis    Acute kidney injury ([facility] [address]       Plan:   1. Overall the patient has worsened  2. Transfer to stepdown. 3. Wean FiO2. 4. Start MDI.   Estrellita Villegas MD  2/5/2022  12:44 PM

## 2022-02-06 LAB
ALBUMIN SERPL-MCNC: 2.7 GM/DL (ref 3.4–5)
ALP BLD-CCNC: 86 IU/L (ref 40–128)
ALT SERPL-CCNC: 31 U/L (ref 10–40)
ANION GAP SERPL CALCULATED.3IONS-SCNC: 14 MMOL/L (ref 4–16)
AST SERPL-CCNC: 36 IU/L (ref 15–37)
BILIRUB SERPL-MCNC: 0.6 MG/DL (ref 0–1)
BUN BLDV-MCNC: 58 MG/DL (ref 6–23)
CALCIUM SERPL-MCNC: 8.2 MG/DL (ref 8.3–10.6)
CHLORIDE BLD-SCNC: 114 MMOL/L (ref 99–110)
CO2: 17 MMOL/L (ref 21–32)
CREAT SERPL-MCNC: 2.1 MG/DL (ref 0.9–1.3)
GFR AFRICAN AMERICAN: 37 ML/MIN/1.73M2
GFR NON-AFRICAN AMERICAN: 31 ML/MIN/1.73M2
GLUCOSE BLD-MCNC: 106 MG/DL (ref 70–99)
GLUCOSE BLD-MCNC: 114 MG/DL (ref 70–99)
GLUCOSE BLD-MCNC: 114 MG/DL (ref 70–99)
GLUCOSE BLD-MCNC: 118 MG/DL (ref 70–99)
GLUCOSE BLD-MCNC: 72 MG/DL (ref 70–99)
GLUCOSE BLD-MCNC: 91 MG/DL (ref 70–99)
HCT VFR BLD CALC: 34.2 % (ref 42–52)
HEMOGLOBIN: 11.8 GM/DL (ref 13.5–18)
MCH RBC QN AUTO: 31 PG (ref 27–31)
MCHC RBC AUTO-ENTMCNC: 34.5 % (ref 32–36)
MCV RBC AUTO: 89.8 FL (ref 78–100)
PDW BLD-RTO: 14.1 % (ref 11.7–14.9)
PLATELET # BLD: 158 K/CU MM (ref 140–440)
PMV BLD AUTO: 11.3 FL (ref 7.5–11.1)
POTASSIUM SERPL-SCNC: 3.8 MMOL/L (ref 3.5–5.1)
RBC # BLD: 3.81 M/CU MM (ref 4.6–6.2)
SODIUM BLD-SCNC: 145 MMOL/L (ref 135–145)
TOTAL PROTEIN: 5.5 GM/DL (ref 6.4–8.2)
WBC # BLD: 13.2 K/CU MM (ref 4–10.5)

## 2022-02-06 PROCEDURE — 6370000000 HC RX 637 (ALT 250 FOR IP): Performed by: INTERNAL MEDICINE

## 2022-02-06 PROCEDURE — 94640 AIRWAY INHALATION TREATMENT: CPT

## 2022-02-06 PROCEDURE — 2580000003 HC RX 258: Performed by: HOSPITALIST

## 2022-02-06 PROCEDURE — 6370000000 HC RX 637 (ALT 250 FOR IP): Performed by: FAMILY MEDICINE

## 2022-02-06 PROCEDURE — 2580000003 HC RX 258: Performed by: FAMILY MEDICINE

## 2022-02-06 PROCEDURE — 85027 COMPLETE CBC AUTOMATED: CPT

## 2022-02-06 PROCEDURE — 36415 COLL VENOUS BLD VENIPUNCTURE: CPT

## 2022-02-06 PROCEDURE — 82962 GLUCOSE BLOOD TEST: CPT

## 2022-02-06 PROCEDURE — 80053 COMPREHEN METABOLIC PANEL: CPT

## 2022-02-06 PROCEDURE — C9113 INJ PANTOPRAZOLE SODIUM, VIA: HCPCS | Performed by: HOSPITALIST

## 2022-02-06 PROCEDURE — 94761 N-INVAS EAR/PLS OXIMETRY MLT: CPT

## 2022-02-06 PROCEDURE — 1200000000 HC SEMI PRIVATE

## 2022-02-06 PROCEDURE — 6360000002 HC RX W HCPCS: Performed by: HOSPITALIST

## 2022-02-06 RX ADMIN — GABAPENTIN 300 MG: 300 CAPSULE ORAL at 08:24

## 2022-02-06 RX ADMIN — PANTOPRAZOLE SODIUM 40 MG: 40 INJECTION, POWDER, FOR SOLUTION INTRAVENOUS at 20:31

## 2022-02-06 RX ADMIN — SODIUM CHLORIDE, PRESERVATIVE FREE 10 ML: 5 INJECTION INTRAVENOUS at 08:24

## 2022-02-06 RX ADMIN — ALBUTEROL SULFATE 2 PUFF: 90 AEROSOL, METERED RESPIRATORY (INHALATION) at 15:53

## 2022-02-06 RX ADMIN — POTASSIUM CHLORIDE 10 MEQ: 20 TABLET, EXTENDED RELEASE ORAL at 20:31

## 2022-02-06 RX ADMIN — METOPROLOL TARTRATE 50 MG: 50 TABLET, FILM COATED ORAL at 20:31

## 2022-02-06 RX ADMIN — POTASSIUM CHLORIDE 10 MEQ: 20 TABLET, EXTENDED RELEASE ORAL at 08:23

## 2022-02-06 RX ADMIN — IPRATROPIUM BROMIDE 2 PUFF: 17 AEROSOL, METERED RESPIRATORY (INHALATION) at 15:54

## 2022-02-06 RX ADMIN — BARICITINIB 2 MG: 2 TABLET, FILM COATED ORAL at 08:24

## 2022-02-06 RX ADMIN — GABAPENTIN 300 MG: 300 CAPSULE ORAL at 14:39

## 2022-02-06 RX ADMIN — DEXAMETHASONE SODIUM PHOSPHATE 10 MG: 10 INJECTION INTRAMUSCULAR; INTRAVENOUS at 20:31

## 2022-02-06 RX ADMIN — PANTOPRAZOLE SODIUM 40 MG: 40 INJECTION, POWDER, FOR SOLUTION INTRAVENOUS at 08:24

## 2022-02-06 RX ADMIN — ASPIRIN 81 MG CHEWABLE TABLET 81 MG: 81 TABLET CHEWABLE at 08:24

## 2022-02-06 RX ADMIN — OXYCODONE HYDROCHLORIDE 2.5 MG: 5 TABLET ORAL at 08:19

## 2022-02-06 RX ADMIN — ENOXAPARIN SODIUM 30 MG: 100 INJECTION SUBCUTANEOUS at 08:24

## 2022-02-06 RX ADMIN — AZITHROMYCIN MONOHYDRATE 500 MG: 500 INJECTION, POWDER, LYOPHILIZED, FOR SOLUTION INTRAVENOUS at 08:33

## 2022-02-06 RX ADMIN — FAMOTIDINE 10 MG: 20 TABLET, FILM COATED ORAL at 08:24

## 2022-02-06 RX ADMIN — OXYCODONE HYDROCHLORIDE 2.5 MG: 5 TABLET ORAL at 14:39

## 2022-02-06 RX ADMIN — DEXAMETHASONE SODIUM PHOSPHATE 10 MG: 10 INJECTION INTRAMUSCULAR; INTRAVENOUS at 11:58

## 2022-02-06 RX ADMIN — ALBUTEROL SULFATE 2 PUFF: 90 AEROSOL, METERED RESPIRATORY (INHALATION) at 08:24

## 2022-02-06 RX ADMIN — IPRATROPIUM BROMIDE 2 PUFF: 17 AEROSOL, METERED RESPIRATORY (INHALATION) at 08:24

## 2022-02-06 RX ADMIN — CARBAMAZEPINE 100 MG: 100 TABLET, EXTENDED RELEASE ORAL at 08:28

## 2022-02-06 RX ADMIN — Medication 2000 UNITS: at 08:24

## 2022-02-06 ASSESSMENT — PAIN SCALES - GENERAL
PAINLEVEL_OUTOF10: 4
PAINLEVEL_OUTOF10: 0
PAINLEVEL_OUTOF10: 9

## 2022-02-06 NOTE — PROGRESS NOTES
Nephrology Progress Note  2/6/2022 2:35 PM        Subjective:   Admit Date: 1/28/2022  PCP: Skip Costa MD    Interval History: Lethargic during my exam     Diet: Probably none    ROS: Very lethargic did respond to verbal stimuli  Urine output recorded 550 cc for the last 24 hours  Afebrile    Data:     Current meds:    insulin glargine  10 Units SubCUTAneous Nightly    albuterol sulfate HFA  2 puff Inhalation 4x daily    ipratropium  2 puff Inhalation 4x daily    oxyCODONE  2.5 mg Oral TID    potassium chloride  10 mEq Oral BID    glipiZIDE  2.5 mg Oral QAM AC    enoxaparin  30 mg SubCUTAneous Daily    azithromycin  500 mg IntraVENous Q24H    pantoprazole  40 mg IntraVENous BID    gabapentin  300 mg Oral TID    carBAMazepine  100 mg Oral BID    baricitinib  2 mg Oral Daily    dexamethasone  10 mg IntraVENous Q12H    metoprolol tartrate  50 mg Oral BID    insulin lispro  0-12 Units SubCUTAneous TID WC    insulin lispro  0-12 Units SubCUTAneous 2 times per day    sodium chloride flush  5-40 mL IntraVENous 2 times per day    Vitamin D  2,000 Units Oral Daily    aspirin  81 mg Oral Daily    famotidine  10 mg Oral Q48H      dextrose      dextrose 5 % with KCl 20 mEq 50 mL/hr at 02/05/22 1124         I/O last 3 completed shifts:   In: 120 [P.O.:120]  Out: 550 [Urine:550]    CBC:   Recent Labs     02/04/22  0254 02/05/22  0606 02/06/22  0611   WBC 10.5 11.7* 13.2*   HGB 11.2* 13.9 11.8*   * 165 158          Recent Labs     02/04/22  0254 02/05/22  0606 02/06/22  0611    144 145   K 3.3* 4.3 3.8   * 114* 114*   CO2 16* 18* 17*   BUN 74* 61* 58*   CREATININE 2.0* 1.9* 2.1*   GLUCOSE 154* 146* 106*       Lab Results   Component Value Date    CALCIUM 8.2 (L) 02/06/2022    PHOS 2.6 02/05/2022       Objective:     Vitals: BP (!) 143/94   Pulse 87   Temp 98 °F (36.7 °C) (Oral)   Resp 29   Ht 6' (1.829 m)   Wt 158 lb 4.6 oz (71.8 kg)   SpO2 97%   BMI 21.47 kg/m²     General appearance: Lethargic  HEENT: Positive conjunctival pallor  Neck: With supplemental oxygen per nasal cannula  Lungs: Adventitial breath sound  Heart: Seems regular rate and rhythm  Abdomen: Soft  Extremities: No overt edema      Problem List :         Impression :     1. Acute kidney injury-on top of CKD stage III-low urine output-remain in risk for further injury  2. Still significant metabolic acidosis-partially hyperchloremic-  3. Underlying hypertension, diabetes and dysrhythmia    Recommendation/Plan  :     1. Probably has minimal oral intake  2. Sodium still more than 140  3. We will watch for iatrogenic nosocomial complication and glycemic control  4. With his confusion I would recommend discontinuation of gabapentin-unless it is for seizure disorder  5.  Follow clinically and biochemically      Reina Guardado MD MD

## 2022-02-06 NOTE — PROGRESS NOTES
Hospitalist Progress Note      Name:  Lam Mccrary /Age/Sex: 1945  (64 y.o. male)   MRN & CSN:  [id number] & [id number] Admission Date/Time: 2022  1:32 PM   Location:  [facility]/5723-T PCP: Karley Landrum MD       Hospital Day: 10    Assessment and Plan:     Patient is 24-year-old male past medical history of chronic pain syndrome, chronic oxycodone use, diabetes, hypertension, hyperlipidemia who was admitted for confusion, generalized weakness. Patient was found to have COVID-19 pneumonia. Patient continues to require oxygen. He is on  baricitinib, dexamethasone and antibiotics. .He had unwitnessed fall  night,CT head neg. #.  Acute hypoxic respiratory failure due to viral pneumonia from COVID-19  #. Viral pneumonia from COVID-19  Patient oxygen requirement worsened to 15 L nonrebreather but now slowly improving. He is down to 10 L HF NC. Continue bronchodilators and wean of oxygen as tolerated  Continue dexamethasone to 20 mg a day  Continue baricitinib  Continue antibiotics  Repeat CXR today-Bilateral hazy parenchymal opacities greater on the left compatible with pneumonia. WBC trending up, may be secondary to steroids but will check CRP and procalcitonin tomorrow. Pulm following . Monitor. #.  Encephalopathy, likely metabolic  Suspect metabolic encephalopathy: Due to combination of hyponatremia, COVID-19 pneumonia,hypoxia and meds  Discontinued restraints  Continue to reorient patient  Hold CNS depressant meds including Neurontin, Tegretol and Roxicodone. Nephrology recommended to hold Tegretol. Monitor  CT head 22 neg. #.  Acute kidney injury likely due to ATN likely due to prerenal azotemia  #. Hypernatremia likely hypovolemic  Nephrology following for hypernatremia, acute kidney injury. Sodium now normal   Cr with some slight fluctuations. #.  Sinus tachycardia/junctional tachycardia  Cardiology following  Continue metoprolol. Improved.      #. Generalized weakness and physical deconditioning  PT/OT  ? Rehab once medically stable for discharge     #. Other medical problems include hypertension, hyperlipidemia, diabetes mellitus, type with hyperglycemia  Continue current medications        DVT prophylaxis: Lovenox  CODE STATUS: Total support    Subjective. :     Patient was seen and examined this morning . Looks better than yesterday. Was awake and alert and answers most of my questions. RN endorsed same about patient. He was on 10 L high flow nasal cannula when I saw him and looks comfortable. Noted from nephrology note that patient was lethargic when seen by him. Objective:   Vitals:   Vitals:    02/06/22 1140   BP:    Pulse:    Resp: 29   Temp:    SpO2: 97%       Physical Exam:   GEN: Awake, alert, in no apparent distress   HEENT: Atraumatic, normocephalic, PERRLA, EOMI  NECK: Supple, no apparent thyromegaly or masses. RESP: Bilateral good air entry, slightly diminished at the bases. CARDIO/VASC:Regular rate and rhythm, normal S1, S2, no murmurs  GI: Abdomen is soft, nontender, no significant organomegaly noted, bowel sounds present  MSK: No gross joint deformities.   Skin: No significant rashes, skin lesions noted  Neuro: AOx2, cranial nerves grossly intact, no focal motor or sensory deficits   PSYCH: Affect appropriate    Medications:   Medications:    insulin glargine  10 Units SubCUTAneous Nightly    albuterol sulfate HFA  2 puff Inhalation 4x daily    ipratropium  2 puff Inhalation 4x daily    [Held by provider] oxyCODONE  2.5 mg Oral TID    potassium chloride  10 mEq Oral BID    glipiZIDE  2.5 mg Oral QAM AC    enoxaparin  30 mg SubCUTAneous Daily    azithromycin  500 mg IntraVENous Q24H    pantoprazole  40 mg IntraVENous BID    [Held by provider] gabapentin  300 mg Oral TID    [Held by provider] carBAMazepine  100 mg Oral BID    baricitinib  2 mg Oral Daily    dexamethasone  10 mg IntraVENous Q12H    metoprolol tartrate 50 mg Oral BID    insulin lispro  0-12 Units SubCUTAneous TID WC    insulin lispro  0-12 Units SubCUTAneous 2 times per day    sodium chloride flush  5-40 mL IntraVENous 2 times per day    Vitamin D  2,000 Units Oral Daily    aspirin  81 mg Oral Daily    famotidine  10 mg Oral Q48H      Infusions:    dextrose      dextrose 5 % with KCl 20 mEq 50 mL/hr at 02/05/22 1124     PRN Meds: glucose, 15 g, PRN  glucagon (rDNA), 1 mg, PRN  dextrose, 100 mL/hr, PRN  dextrose bolus (hypoglycemia), 125 mL, PRN   Or  dextrose bolus (hypoglycemia), 250 mL, PRN  sodium chloride flush, 5-40 mL, PRN  ondansetron, 4 mg, Q8H PRN   Or  ondansetron, 4 mg, Q6H PRN  polyethylene glycol, 17 g, Daily PRN  acetaminophen, 650 mg, Q6H PRN   Or  acetaminophen, 650 mg, Q6H PRN          Electronically signed by Semaj Angel MD on 2/6/2022 at 3:06 PM

## 2022-02-06 NOTE — PROGRESS NOTES
Progress Note( Dr. Kinga Ballesteros)    Subjective:   Admit Date: 1/28/2022  PCP: Landy Lau MD    Admitted For : altered mental state with generalized weakness positive for Covid pneumonia    Consulted For: Better contr ol of blood glucose    Interval History: Feels worse  Was in respiratory distress   Patient appeared to be confused    Denies any chest pains,   Yes SOB . On  oxygen  Denies nausea or vomiting. No new bowel or bladder symptoms. No intake or output data in the 24 hours ending 02/06/22 0623    DATA    CBC:   Recent Labs     02/03/22  1004 02/04/22  0254 02/05/22  0606   WBC 11.8* 10.5 11.7*   HGB 12.7* 11.2* 13.9    134* 165    CMP:  Recent Labs     02/03/22  1004 02/04/22  0254 02/05/22  0606   * 145 144   K 3.5 3.3* 4.3   * 114* 114*   CO2 17* 16* 18*   BUN 84* 74* 61*   CREATININE 2.1* 2.0* 1.9*   CALCIUM 8.2* 7.8* 8.5   PROT 5.6* 5.1* 6.1*   LABALBU 2.8*  2.8* 2.6*  2.6* 3.1*   BILITOT 0.5 0.4 0.6   ALKPHOS 74 72 90   AST 30 29 28   ALT 32 29 29     Lipids: No results found for: CHOL, HDL, TRIG  Glucose:  Recent Labs     02/05/22  1625 02/05/22  2102 02/06/22  0418   POCGLU 168* 352* 118*     [id number]:  Lab Results   Component Value Date    LABA1C 6.0 01/29/2022     High Sensitivity TSH:   Lab Results   Component Value Date    TSHHS 0.335 02/01/2022     Free T3: No results found for: FT3  Free T4:  Lab Results   Component Value Date    T4FREE 1.28 02/01/2022       CT HEAD WO CONTRAST   Final Result   No acute intracranial abnormality. RECOMMENDATIONS:   Unavailable         XR CHEST PORTABLE   Final Result   Bilateral hazy parenchymal opacities greater on the left compatible with   pneumonia. Slightly diminished inspiratory effort. XR CHEST PORTABLE   Final Result   Left basilar infiltrates concerning for pneumonia. XR CHEST PORTABLE   Final Result   Mild bibasilar airspace disease, greater on the left, most likely pneumonia.          VL DUP LOWER EXTREMITY VENOUS BILATERAL   Final Result   Left anterior tibial vein is not visualized and patient trying to get out of   bed during the exam.      No definite deep venous thrombosis is demonstrated. NM LUNG SCAN PERFUSION ONLY   Final Result   Low Probability for Pulmonary Embolus. CT HEAD WO CONTRAST   Final Result   1. No acute intracranial abnormality. 2. Diffuse parenchymal volume loss with mild-to-moderate chronic white matter   microvascular ischemic changes. 3. Chronic lacunar infarct in the right thalamus. XR CHEST PORTABLE   Final Result   Ill-defined left-greater-than-right bibasilar airspace disease most   consistent with pneumonia.               Scheduled Medicines   Medications:    insulin glargine  10 Units SubCUTAneous Nightly    albuterol sulfate HFA  2 puff Inhalation 4x daily    ipratropium  2 puff Inhalation 4x daily    oxyCODONE  2.5 mg Oral TID    potassium chloride  10 mEq Oral BID    glipiZIDE  2.5 mg Oral QAM AC    enoxaparin  30 mg SubCUTAneous Daily    azithromycin  500 mg IntraVENous Q24H    pantoprazole  40 mg IntraVENous BID    gabapentin  300 mg Oral TID    carBAMazepine  100 mg Oral BID    baricitinib  2 mg Oral Daily    dexamethasone  10 mg IntraVENous Q12H    metoprolol tartrate  50 mg Oral BID    insulin lispro  0-12 Units SubCUTAneous TID WC    insulin lispro  0-12 Units SubCUTAneous 2 times per day    sodium chloride flush  5-40 mL IntraVENous 2 times per day    Vitamin D  2,000 Units Oral Daily    aspirin  81 mg Oral Daily    famotidine  10 mg Oral Q48H      Infusions:    dextrose      dextrose 5 % with KCl 20 mEq 50 mL/hr at 02/05/22 1124         Objective:   Vitals: BP (!) 97/54   Pulse 81   Temp 98.1 °F (36.7 °C) (Oral)   Resp 30   Ht 6' (1.829 m)   Wt 161 lb (73 kg)   SpO2 92%   BMI 21.84 kg/m²   General appearance: alert and cooperative with exam feels  Neck: no JVD or bruit  Thyroid : Normal lobes   Lungs: Has Vesicular Breath sounds   Heart:  regular rate and rhythm  Abdomen: soft, non-tender; bowel sounds normal; no masses,  no organomegaly  Musculoskeletal: Normal  Extremities: extremities normal, , no edema  Neurologic:  Awake, alert, oriented to name, place and time. Appears to be confused cranial nerves II-XII are grossly intact. Motor is  intact. Sensory is intact. ,  and gait is abnormal.  And unstable    Assessment:     Patient Active Problem List:     Acute kidney injury ([facility] [address]     Diabetes mellitus type II mild       Bilateral pneumonia possibly Covid related            Plan:     1. Reviewed POC blood glucose . Labs and X ray results   2. Reviewed Current Medicines   3. On  Correction bolus Humalog/ Basal Lantus Insulin regime   4. Monitor Blood glucose frequently   5. Modified  the dose of Insulin/ other medicines as needed   6. Will follow     .      Lawyer Yefri MD, MD

## 2022-02-06 NOTE — PROGRESS NOTES
Progress Note( Dr. Krista Mullins)    Subjective:   Admit Date: 1/28/2022  PCP: Ashley Dutton MD    Admitted For : altered mental state with generalized weakness positive for Covid pneumonia    Consulted For: Better contr ol of blood glucose    Interval History: Feels somewhat better  Seem to be somewhat confused    Denies any chest pains,   Yes SOB . On  oxygen  Denies nausea or vomiting. No new bowel or bladder symptoms. No intake or output data in the 24 hours ending 02/06/22 0622    DATA    CBC:   Recent Labs     02/03/22  1004 02/04/22  0254 02/05/22 0606   WBC 11.8* 10.5 11.7*   HGB 12.7* 11.2* 13.9    134* 165    CMP:  Recent Labs     02/03/22  1004 02/04/22  0254 02/05/22  0606   * 145 144   K 3.5 3.3* 4.3   * 114* 114*   CO2 17* 16* 18*   BUN 84* 74* 61*   CREATININE 2.1* 2.0* 1.9*   CALCIUM 8.2* 7.8* 8.5   PROT 5.6* 5.1* 6.1*   LABALBU 2.8*  2.8* 2.6*  2.6* 3.1*   BILITOT 0.5 0.4 0.6   ALKPHOS 74 72 90   AST 30 29 28   ALT 32 29 29     Lipids: No results found for: CHOL, HDL, TRIG  Glucose:  Recent Labs     02/05/22  1625 02/05/22  2102 02/06/22  0418   POCGLU 168* 352* 118*     [id number]:  Lab Results   Component Value Date    LABA1C 6.0 01/29/2022     High Sensitivity TSH:   Lab Results   Component Value Date    TSHHS 0.335 02/01/2022     Free T3: No results found for: FT3  Free T4:  Lab Results   Component Value Date    T4FREE 1.28 02/01/2022       CT HEAD WO CONTRAST   Final Result   No acute intracranial abnormality. RECOMMENDATIONS:   Unavailable         XR CHEST PORTABLE   Final Result   Bilateral hazy parenchymal opacities greater on the left compatible with   pneumonia. Slightly diminished inspiratory effort. XR CHEST PORTABLE   Final Result   Left basilar infiltrates concerning for pneumonia. XR CHEST PORTABLE   Final Result   Mild bibasilar airspace disease, greater on the left, most likely pneumonia.          VL DUP LOWER EXTREMITY VENOUS BILATERAL   Final Result   Left anterior tibial vein is not visualized and patient trying to get out of   bed during the exam.      No definite deep venous thrombosis is demonstrated. NM LUNG SCAN PERFUSION ONLY   Final Result   Low Probability for Pulmonary Embolus. CT HEAD WO CONTRAST   Final Result   1. No acute intracranial abnormality. 2. Diffuse parenchymal volume loss with mild-to-moderate chronic white matter   microvascular ischemic changes. 3. Chronic lacunar infarct in the right thalamus. XR CHEST PORTABLE   Final Result   Ill-defined left-greater-than-right bibasilar airspace disease most   consistent with pneumonia.               Scheduled Medicines   Medications:    insulin glargine  10 Units SubCUTAneous Nightly    albuterol sulfate HFA  2 puff Inhalation 4x daily    ipratropium  2 puff Inhalation 4x daily    oxyCODONE  2.5 mg Oral TID    potassium chloride  10 mEq Oral BID    glipiZIDE  2.5 mg Oral QAM AC    enoxaparin  30 mg SubCUTAneous Daily    azithromycin  500 mg IntraVENous Q24H    pantoprazole  40 mg IntraVENous BID    gabapentin  300 mg Oral TID    carBAMazepine  100 mg Oral BID    baricitinib  2 mg Oral Daily    dexamethasone  10 mg IntraVENous Q12H    metoprolol tartrate  50 mg Oral BID    insulin lispro  0-12 Units SubCUTAneous TID WC    insulin lispro  0-12 Units SubCUTAneous 2 times per day    sodium chloride flush  5-40 mL IntraVENous 2 times per day    Vitamin D  2,000 Units Oral Daily    aspirin  81 mg Oral Daily    famotidine  10 mg Oral Q48H      Infusions:    dextrose      dextrose 5 % with KCl 20 mEq 50 mL/hr at 02/05/22 1124         Objective:   Vitals: BP (!) 97/54   Pulse 81   Temp 98.1 °F (36.7 °C) (Oral)   Resp 30   Ht 6' (1.829 m)   Wt 161 lb (73 kg)   SpO2 92%   BMI 21.84 kg/m²   General appearance: alert and cooperative with exam  Neck: no JVD or bruit  Thyroid : Normal lobes   Lungs: Has Vesicular Breath sounds Heart:  regular rate and rhythm  Abdomen: soft, non-tender; bowel sounds normal; no masses,  no organomegaly  Musculoskeletal: Normal  Extremities: extremities normal, , no edema  Neurologic:  Awake, alert, oriented to name, place and time. Cranial nerves II-XII are grossly intact. Motor is  intact. Sensory is intact. ,  and gait is normal.    Assessment:     Patient Active Problem List:     Acute kidney injury ([facility] [address]     Diabetes mellitus type II mild       Bilateral pneumonia possibly Covid related            Plan:     1. Reviewed POC blood glucose . Labs and X ray results   2. Reviewed Current Medicines   3. On  Correction bolus Humalog/ Basal Lantus Insulin regime   4. Monitor Blood glucose frequently   5. Modified  the dose of Insulin/ other medicines as needed   6. Will follow     .      Marian Boykin MD, MD

## 2022-02-06 NOTE — PROGRESS NOTES
Pulmonary and Critical Care  Progress Note    Subjective: The patient is sleepy. Shortness of breath unchanged. Chest pain none  Addressing respiratory complaints Patient is negative for  hemoptysis and cyanosis  CONSTITUTIONAL:  negative for fevers and chills      Past Medical History:     has a past medical history of DM (diabetes mellitus) ([address] Dysphagia, Dysphonia, Hyperlipidemia, Hypertension, Kidney failure, and Osteoarthritis. has a past surgical history that includes Cervical spine surgery and Endoscopy, colon, diagnostic (2/19/2016). reports that he quit smoking about 12 years ago. He quit after 50.00 years of use. He does not have any smokeless tobacco history on file. He reports that he does not drink alcohol and does not use drugs. Family history:  family history includes Cancer (age of onset: 61) in his sister; Cancer (age of onset: 68) in his father. No Known Allergies  Social History:    Reviewed; no changes    Objective:   PHYSICAL EXAM:        VITALS:  BP (!) 143/94   Pulse 87   Temp 98 °F (36.7 °C) (Oral)   Resp 29   Ht 6' (1.829 m)   Wt 158 lb 4.6 oz (71.8 kg)   SpO2 97%   BMI 21.47 kg/m²     24HR INTAKE/OUTPUT:  No intake or output data in the 24 hours ending 02/06/22 1503    CONSTITUTIONAL:  Sleepy. LUNGS:  decreased breath sounds, basilar crackles. CARDIOVASCULAR:  normal S1 and S2 and negative JVD  ABD:Abdomen soft, non-tender. BS normal. No masses,  No organomegaly.   DATA:    CBC:  Recent Labs     02/04/22 0254 02/05/22  0606 02/06/22  0611   WBC 10.5 11.7* 13.2*   RBC 3.68* 4.48* 3.81*   HGB 11.2* 13.9 11.8*   HCT 32.5* 40.2* 34.2*   * 165 158   MCV 88.3 89.7 89.8   MCH 30.4 31.0 31.0   MCHC 34.5 34.6 34.5   RDW 13.7 13.9 14.1      BMP:  Recent Labs     02/04/22 0254 02/05/22  0606 02/06/22  0611    144 145   K 3.3* 4.3 3.8   * 114* 114*   CO2 16* 18* 17*   BUN 74* 61* 58*   CREATININE 2.0* 1.9* 2.1*   CALCIUM 7.8* 8.5 8.2*   GLUCOSE 154* 146* 106* ABG:  Recent Labs     02/05/22  0945   PH 7.41   PO2ART 39*   GCR0TPM 17.0*   O2SAT 74.6*     Lab Results   Component Value Date    PROBNP 614.7 (H) 01/28/2022    PROBNP 272.0 03/14/2017     No results found for: CULTRESP    Radiology Review:  Pertinent images / reports were reviewed as a part of this visit. Assessment:     Patient Active Problem List   Diagnosis    Acute kidney injury ([facility] [address]       Plan:   1. Overall the patient is unchanged  2. Wean FiO2.   Dakota Cabral MD  2/6/2022  3:03 PM

## 2022-02-07 LAB
ALBUMIN SERPL-MCNC: 2.7 GM/DL (ref 3.4–5)
ALP BLD-CCNC: 95 IU/L (ref 40–128)
ALT SERPL-CCNC: 34 U/L (ref 10–40)
ANION GAP SERPL CALCULATED.3IONS-SCNC: 15 MMOL/L (ref 4–16)
AST SERPL-CCNC: 32 IU/L (ref 15–37)
BASOPHILS ABSOLUTE: 0 K/CU MM
BASOPHILS RELATIVE PERCENT: 0.2 % (ref 0–1)
BILIRUB SERPL-MCNC: 0.5 MG/DL (ref 0–1)
BUN BLDV-MCNC: 50 MG/DL (ref 6–23)
CALCIUM SERPL-MCNC: 8.4 MG/DL (ref 8.3–10.6)
CHLORIDE BLD-SCNC: 118 MMOL/L (ref 99–110)
CO2: 14 MMOL/L (ref 21–32)
CREAT SERPL-MCNC: 1.7 MG/DL (ref 0.9–1.3)
DIFFERENTIAL TYPE: ABNORMAL
EOSINOPHILS ABSOLUTE: 0 K/CU MM
EOSINOPHILS RELATIVE PERCENT: 0 % (ref 0–3)
GFR AFRICAN AMERICAN: 47 ML/MIN/1.73M2
GFR NON-AFRICAN AMERICAN: 39 ML/MIN/1.73M2
GLUCOSE BLD-MCNC: 100 MG/DL (ref 70–99)
GLUCOSE BLD-MCNC: 131 MG/DL (ref 70–99)
GLUCOSE BLD-MCNC: 166 MG/DL (ref 70–99)
GLUCOSE BLD-MCNC: 493 MG/DL (ref 70–99)
GLUCOSE BLD-MCNC: 94 MG/DL (ref 70–99)
HCT VFR BLD CALC: 36.5 % (ref 42–52)
HEMOGLOBIN: 12.4 GM/DL (ref 13.5–18)
HIGH SENSITIVE C-REACTIVE PROTEIN: 148.4 MG/L
IMMATURE NEUTROPHIL %: 1.5 % (ref 0–0.43)
LYMPHOCYTES ABSOLUTE: 0.5 K/CU MM
LYMPHOCYTES RELATIVE PERCENT: 5.5 % (ref 24–44)
MCH RBC QN AUTO: 30.5 PG (ref 27–31)
MCHC RBC AUTO-ENTMCNC: 34 % (ref 32–36)
MCV RBC AUTO: 89.9 FL (ref 78–100)
MONOCYTES ABSOLUTE: 0.1 K/CU MM
MONOCYTES RELATIVE PERCENT: 1.5 % (ref 0–4)
NUCLEATED RBC %: 0 %
PDW BLD-RTO: 14.2 % (ref 11.7–14.9)
PLATELET # BLD: 124 K/CU MM (ref 140–440)
PMV BLD AUTO: 11 FL (ref 7.5–11.1)
POTASSIUM SERPL-SCNC: 4.5 MMOL/L (ref 3.5–5.1)
PROCALCITONIN: 0.47
RBC # BLD: 4.06 M/CU MM (ref 4.6–6.2)
SEGMENTED NEUTROPHILS ABSOLUTE COUNT: 8.8 K/CU MM
SEGMENTED NEUTROPHILS RELATIVE PERCENT: 91.3 % (ref 36–66)
SODIUM BLD-SCNC: 147 MMOL/L (ref 135–145)
TOTAL IMMATURE NEUTOROPHIL: 0.14 K/CU MM
TOTAL NUCLEATED RBC: 0 K/CU MM
TOTAL PROTEIN: 5.6 GM/DL (ref 6.4–8.2)
WBC # BLD: 9.6 K/CU MM (ref 4–10.5)

## 2022-02-07 PROCEDURE — 2580000003 HC RX 258: Performed by: HOSPITALIST

## 2022-02-07 PROCEDURE — 85027 COMPLETE CBC AUTOMATED: CPT

## 2022-02-07 PROCEDURE — 6360000002 HC RX W HCPCS: Performed by: HOSPITALIST

## 2022-02-07 PROCEDURE — 1200000000 HC SEMI PRIVATE

## 2022-02-07 PROCEDURE — 6360000002 HC RX W HCPCS: Performed by: INTERNAL MEDICINE

## 2022-02-07 PROCEDURE — 6370000000 HC RX 637 (ALT 250 FOR IP): Performed by: FAMILY MEDICINE

## 2022-02-07 PROCEDURE — 6370000000 HC RX 637 (ALT 250 FOR IP): Performed by: INTERNAL MEDICINE

## 2022-02-07 PROCEDURE — 86141 C-REACTIVE PROTEIN HS: CPT

## 2022-02-07 PROCEDURE — 94640 AIRWAY INHALATION TREATMENT: CPT

## 2022-02-07 PROCEDURE — C9113 INJ PANTOPRAZOLE SODIUM, VIA: HCPCS | Performed by: HOSPITALIST

## 2022-02-07 PROCEDURE — 36415 COLL VENOUS BLD VENIPUNCTURE: CPT

## 2022-02-07 PROCEDURE — 84145 PROCALCITONIN (PCT): CPT

## 2022-02-07 PROCEDURE — 2580000003 HC RX 258: Performed by: FAMILY MEDICINE

## 2022-02-07 PROCEDURE — 2580000003 HC RX 258: Performed by: INTERNAL MEDICINE

## 2022-02-07 PROCEDURE — 94664 DEMO&/EVAL PT USE INHALER: CPT

## 2022-02-07 PROCEDURE — 85025 COMPLETE CBC W/AUTO DIFF WBC: CPT

## 2022-02-07 PROCEDURE — 2700000000 HC OXYGEN THERAPY PER DAY

## 2022-02-07 PROCEDURE — 94761 N-INVAS EAR/PLS OXIMETRY MLT: CPT

## 2022-02-07 PROCEDURE — 80053 COMPREHEN METABOLIC PANEL: CPT

## 2022-02-07 PROCEDURE — 82962 GLUCOSE BLOOD TEST: CPT

## 2022-02-07 RX ORDER — DEXTROSE MONOHYDRATE 100 MG/ML
INJECTION, SOLUTION INTRAVENOUS CONTINUOUS
Status: DISCONTINUED | OUTPATIENT
Start: 2022-02-07 | End: 2022-02-08

## 2022-02-07 RX ADMIN — BARICITINIB 2 MG: 2 TABLET, FILM COATED ORAL at 08:52

## 2022-02-07 RX ADMIN — SODIUM CHLORIDE, PRESERVATIVE FREE 10 ML: 5 INJECTION INTRAVENOUS at 08:54

## 2022-02-07 RX ADMIN — ALBUTEROL SULFATE 2 PUFF: 90 AEROSOL, METERED RESPIRATORY (INHALATION) at 07:59

## 2022-02-07 RX ADMIN — AZITHROMYCIN MONOHYDRATE 500 MG: 500 INJECTION, POWDER, LYOPHILIZED, FOR SOLUTION INTRAVENOUS at 12:03

## 2022-02-07 RX ADMIN — ENOXAPARIN SODIUM 30 MG: 100 INJECTION SUBCUTANEOUS at 13:56

## 2022-02-07 RX ADMIN — POTASSIUM CHLORIDE 10 MEQ: 20 TABLET, EXTENDED RELEASE ORAL at 21:00

## 2022-02-07 RX ADMIN — ALBUTEROL SULFATE 2 PUFF: 90 AEROSOL, METERED RESPIRATORY (INHALATION) at 16:03

## 2022-02-07 RX ADMIN — IPRATROPIUM BROMIDE 2 PUFF: 17 AEROSOL, METERED RESPIRATORY (INHALATION) at 16:04

## 2022-02-07 RX ADMIN — ALBUTEROL SULFATE 2 PUFF: 90 AEROSOL, METERED RESPIRATORY (INHALATION) at 12:01

## 2022-02-07 RX ADMIN — METOPROLOL TARTRATE 50 MG: 50 TABLET, FILM COATED ORAL at 21:00

## 2022-02-07 RX ADMIN — DEXAMETHASONE SODIUM PHOSPHATE 10 MG: 10 INJECTION INTRAMUSCULAR; INTRAVENOUS at 20:59

## 2022-02-07 RX ADMIN — ASPIRIN 81 MG CHEWABLE TABLET 81 MG: 81 TABLET CHEWABLE at 08:52

## 2022-02-07 RX ADMIN — PANTOPRAZOLE SODIUM 40 MG: 40 INJECTION, POWDER, FOR SOLUTION INTRAVENOUS at 20:59

## 2022-02-07 RX ADMIN — DEXTROSE MONOHYDRATE: 100 INJECTION, SOLUTION INTRAVENOUS at 17:51

## 2022-02-07 RX ADMIN — Medication 2000 UNITS: at 08:52

## 2022-02-07 RX ADMIN — POTASSIUM CHLORIDE 10 MEQ: 20 TABLET, EXTENDED RELEASE ORAL at 08:52

## 2022-02-07 RX ADMIN — POTASSIUM CHLORIDE AND DEXTROSE MONOHYDRATE: 150; 5 INJECTION, SOLUTION INTRAVENOUS at 15:55

## 2022-02-07 RX ADMIN — PANTOPRAZOLE SODIUM 40 MG: 40 INJECTION, POWDER, FOR SOLUTION INTRAVENOUS at 08:52

## 2022-02-07 RX ADMIN — IPRATROPIUM BROMIDE 2 PUFF: 17 AEROSOL, METERED RESPIRATORY (INHALATION) at 08:00

## 2022-02-07 RX ADMIN — IPRATROPIUM BROMIDE 2 PUFF: 17 AEROSOL, METERED RESPIRATORY (INHALATION) at 12:02

## 2022-02-07 RX ADMIN — DEXAMETHASONE SODIUM PHOSPHATE 10 MG: 10 INJECTION INTRAMUSCULAR; INTRAVENOUS at 11:59

## 2022-02-07 NOTE — PROGRESS NOTES
Blood sugar at 1730 was 36. Dextrose given. Will recheck per protocol. Patient drank orange juice PO- recheck 131 @ 1750. D10 @ 50 ml/hr started. Will continue to monitor.

## 2022-02-07 NOTE — PROGRESS NOTES
Progress Note( Dr. Kelly Son)    Subjective:   Admit Date: 1/28/2022  PCP: Antonio Arredondo MD    Admitted For : altered mental state with generalized weakness positive for Covid pneumonia    Consulted For: Better contr ol of blood glucose    Interval History: Feels somewhat better  Has hypernatremia    Denies any chest pains,   Yes SOB . On  oxygen  Denies nausea or vomiting. No new bowel or bladder symptoms. No intake or output data in the 24 hours ending 02/06/22 1918    DATA    CBC:    CMP:    Lipids: No results found for: CHOL, HDL, TRIG  Glucose:    [id number]:  Lab Results   Component Value Date    LABA1C 6.0 01/29/2022     High Sensitivity TSH:   Lab Results   Component Value Date    TSHHS 0.335 02/01/2022     Free T3: No results found for: FT3  Free T4:  Lab Results   Component Value Date    T4FREE 1.28 02/01/2022       CT HEAD WO CONTRAST   Final Result   No acute intracranial abnormality. RECOMMENDATIONS:   Unavailable         XR CHEST PORTABLE   Final Result   Bilateral hazy parenchymal opacities greater on the left compatible with   pneumonia. Slightly diminished inspiratory effort. XR CHEST PORTABLE   Final Result   Left basilar infiltrates concerning for pneumonia. XR CHEST PORTABLE   Final Result   Mild bibasilar airspace disease, greater on the left, most likely pneumonia. VL DUP LOWER EXTREMITY VENOUS BILATERAL   Final Result   Left anterior tibial vein is not visualized and patient trying to get out of   bed during the exam.      No definite deep venous thrombosis is demonstrated. NM LUNG SCAN PERFUSION ONLY   Final Result   Low Probability for Pulmonary Embolus. CT HEAD WO CONTRAST   Final Result   1. No acute intracranial abnormality. 2. Diffuse parenchymal volume loss with mild-to-moderate chronic white matter   microvascular ischemic changes. 3. Chronic lacunar infarct in the right thalamus.          XR CHEST PORTABLE   Final Result   Ill-defined left-greater-than-right bibasilar airspace disease most   consistent with pneumonia. Scheduled Medicines   Medications:    insulin glargine  10 Units SubCUTAneous Nightly    albuterol sulfate HFA  2 puff Inhalation 4x daily    ipratropium  2 puff Inhalation 4x daily    [Held by provider] oxyCODONE  2.5 mg Oral TID    potassium chloride  10 mEq Oral BID    glipiZIDE  2.5 mg Oral QAM AC    enoxaparin  30 mg SubCUTAneous Daily    azithromycin  500 mg IntraVENous Q24H    pantoprazole  40 mg IntraVENous BID    [Held by provider] gabapentin  300 mg Oral TID    [Held by provider] carBAMazepine  100 mg Oral BID    baricitinib  2 mg Oral Daily    dexamethasone  10 mg IntraVENous Q12H    metoprolol tartrate  50 mg Oral BID    insulin lispro  0-12 Units SubCUTAneous TID WC    insulin lispro  0-12 Units SubCUTAneous 2 times per day    sodium chloride flush  5-40 mL IntraVENous 2 times per day    Vitamin D  2,000 Units Oral Daily    aspirin  81 mg Oral Daily    famotidine  10 mg Oral Q48H      Infusions:    dextrose      dextrose 5 % with KCl 20 mEq 50 mL/hr at 02/05/22 1124         Objective:   Vitals: /64   Pulse 94   Temp 97.3 °F (36.3 °C) (Axillary)   Resp 23   Ht 6' (1.829 m)   Wt 158 lb 4.6 oz (71.8 kg)   SpO2 (!) 83%   BMI 21.47 kg/m²   General appearance: alert and cooperative with exam  Neck: no JVD or bruit  Thyroid : Normal lobes   Lungs: Has Vesicular Breath sounds   Heart:  regular rate and rhythm  Abdomen: soft, non-tender; bowel sounds normal; no masses,  no organomegaly  Musculoskeletal: Normal  Extremities: extremities normal, , no edema  Neurologic:  Awake, alert, oriented to name, place and time. Cranial nerves II-XII are grossly intact. Motor is  intact. Sensory is intact. ,  and gait is normal.    Assessment:     Patient Active Problem List:     Acute kidney injury ([facility] [address]     Diabetes mellitus type II mild       Bilateral pneumonia possibly Covid related            Plan:     1. Reviewed POC blood glucose . Labs and X ray results   2. Reviewed Current Medicines   3. On  Correction bolus Humalog/ Basal Lantus Insulin regime   4. Monitor Blood glucose frequently   5. Modified  the dose of Insulin/ other medicines as needed   6. Will follow     .      Bill Fields MD, MD

## 2022-02-07 NOTE — PROGRESS NOTES
Progress Note( Dr. Caron Foley)    Subjective:   Admit Date: 1/28/2022  PCP: Chan Rene MD    Admitted For : altered mental state with generalized weakness positive for Covid pneumonia    Consulted For: Better contr ol of blood glucose    Interval History: Feels somewhat better  Has hypernatremia    Denies any chest pains,   Yes SOB . On  oxygen  Denies nausea or vomiting. No new bowel or bladder symptoms. No intake or output data in the 24 hours ending 02/06/22 1919    DATA    CBC:    CMP:    Lipids: No results found for: CHOL, HDL, TRIG  Glucose:    OxjahxbcoqO0A:  Lab Results   Component Value Date    LABA1C 6.0 01/29/2022     High Sensitivity TSH:   Lab Results   Component Value Date    TSHHS 0.335 02/01/2022     Free T3: No results found for: FT3  Free T4:  Lab Results   Component Value Date    T4FREE 1.28 02/01/2022       CT HEAD WO CONTRAST   Final Result   No acute intracranial abnormality. RECOMMENDATIONS:   Unavailable         XR CHEST PORTABLE   Final Result   Bilateral hazy parenchymal opacities greater on the left compatible with   pneumonia. Slightly diminished inspiratory effort. XR CHEST PORTABLE   Final Result   Left basilar infiltrates concerning for pneumonia. XR CHEST PORTABLE   Final Result   Mild bibasilar airspace disease, greater on the left, most likely pneumonia. VL DUP LOWER EXTREMITY VENOUS BILATERAL   Final Result   Left anterior tibial vein is not visualized and patient trying to get out of   bed during the exam.      No definite deep venous thrombosis is demonstrated. NM LUNG SCAN PERFUSION ONLY   Final Result   Low Probability for Pulmonary Embolus. CT HEAD WO CONTRAST   Final Result   1. No acute intracranial abnormality. 2. Diffuse parenchymal volume loss with mild-to-moderate chronic white matter   microvascular ischemic changes. 3. Chronic lacunar infarct in the right thalamus.          XR CHEST PORTABLE   Final Result   Ill-defined left-greater-than-right bibasilar airspace disease most   consistent with pneumonia. Scheduled Medicines   Medications:    insulin glargine  10 Units SubCUTAneous Nightly    albuterol sulfate HFA  2 puff Inhalation 4x daily    ipratropium  2 puff Inhalation 4x daily    [Held by provider] oxyCODONE  2.5 mg Oral TID    potassium chloride  10 mEq Oral BID    glipiZIDE  2.5 mg Oral QAM AC    enoxaparin  30 mg SubCUTAneous Daily    azithromycin  500 mg IntraVENous Q24H    pantoprazole  40 mg IntraVENous BID    [Held by provider] gabapentin  300 mg Oral TID    [Held by provider] carBAMazepine  100 mg Oral BID    baricitinib  2 mg Oral Daily    dexamethasone  10 mg IntraVENous Q12H    metoprolol tartrate  50 mg Oral BID    insulin lispro  0-12 Units SubCUTAneous TID WC    insulin lispro  0-12 Units SubCUTAneous 2 times per day    sodium chloride flush  5-40 mL IntraVENous 2 times per day    Vitamin D  2,000 Units Oral Daily    aspirin  81 mg Oral Daily    famotidine  10 mg Oral Q48H      Infusions:    dextrose      dextrose 5 % with KCl 20 mEq 50 mL/hr at 02/05/22 1124         Objective:   Vitals: /64   Pulse 94   Temp 97.3 °F (36.3 °C) (Axillary)   Resp 23   Ht 6' (1.829 m)   Wt 158 lb 4.6 oz (71.8 kg)   SpO2 (!) 83%   BMI 21.47 kg/m²   General appearance: alert and cooperative with exam  Neck: no JVD or bruit  Thyroid : Normal lobes   Lungs: Has Vesicular Breath sounds   Heart:  regular rate and rhythm  Abdomen: soft, non-tender; bowel sounds normal; no masses,  no organomegaly  Musculoskeletal: Normal  Extremities: extremities normal, , no edema  Neurologic:  Awake, alert, oriented to name, place and time. Cranial nerves II-XII are grossly intact. Motor is  intact. Sensory is intact. ,  and gait is normal.    Assessment:     Patient Active Problem List:     Acute kidney injury ([facility] [address]     Diabetes mellitus type II mild       Bilateral pneumonia possibly Covid related            Plan:     1. Reviewed POC blood glucose . Labs and X ray results   2. Reviewed Current Medicines   3. On  Correction bolus Humalog/ Basal Lantus Insulin regime   4. Monitor Blood glucose frequently   5. Modified  the dose of Insulin/ other medicines as needed   6. Will follow     .      Refugio Montanez MD, MD

## 2022-02-07 NOTE — PROGRESS NOTES
Hospitalist Progress Note      Name:  [place] Hemphill /Age/Sex: 1945  (64 y.o. male)   MRN & CSN:  [id number] & [id number] Admission Date/Time: 2022  1:32 PM   Location:  [id number] PCP: Leda Sheets MD       Hospital Day: 11    Assessment and Plan:     Patient is 68-year-old male past medical history of chronic pain syndrome, chronic oxycodone use, diabetes, hypertension, hyperlipidemia who was admitted for confusion, generalized weakness. Patient was found to have COVID-19 pneumonia. Patient continues to require oxygen. He is on  baricitinib, dexamethasone and antibiotics. .He had unwitnessed fall  night,CT head neg. #.  Acute hypoxic respiratory failure due to viral pneumonia from COVID-19  #. Viral pneumonia from COVID-19  Patient oxygen requirement worsened to 15 L nonrebreather but now slowly improving. He is down to 5L HF NC. Continue bronchodilators and wean of oxygen as tolerated  Continue dexamethasone to 10 mg twice a day  Continue baricitinib  Continue antibiotics  Repeat CXR today-Bilateral hazy parenchymal opacities greater on the left compatible with pneumonia. WBC trending up, may be secondary to steroids but will check CRP and procalcitonin. Pulm following . Monitor. #.  Encephalopathy, likely metabolic  Suspect metabolic encephalopathy: Due to combination of hyponatremia, COVID-19 pneumonia,hypoxia and meds  Discontinued restraints  Continue to reorient patient  Hold CNS depressant meds including Neurontin, Tegretol and Roxicodone. Nephrology recommended to hold Tegretol. Monitor  CT head 22 neg. #.  Acute kidney injury likely due to ATN likely due to prerenal azotemia  #. Hypernatremia likely hypovolemic  Nephrology following for hypernatremia, acute kidney injury. Sodium now normal   Cr with some slight fluctuations. #.  Sinus tachycardia/junctional tachycardia  Cardiology following  Continue metoprolol. Improved.      #. Generalized weakness and physical deconditioning  PT/OT  ? Rehab once medically stable for discharge     #. Other medical problems include hypertension, hyperlipidemia, DM with hyperglycemia  Continue current medications        DVT prophylaxis: Lovenox  CODE STATUS: Total support    Subjective. :     Patient was seen and examined this morning . D/W RN. No new issues. Not eating much. Oxygen need decreasing. Looks weak and chronically ill. Objective:   Vitals:   Vitals:    02/07/22 0850   BP: 128/67   Pulse: 81   Resp: 20   Temp: 97.9 °F (36.6 °C)   SpO2: 92%       Physical Exam:   GEN: Awake, alert, in no apparent distress   HEENT: Atraumatic, normocephalic, PERRLA, EOMI  NECK: Supple, no apparent thyromegaly or masses. RESP: Bilateral good air entry, slightly diminished at the bases. CARDIO/VASC:Regular rate and rhythm, normal S1, S2, no murmurs  GI: Abdomen is soft, nontender, no significant organomegaly noted, bowel sounds present  MSK: No gross joint deformities.   Skin: No significant rashes, skin lesions noted  Neuro: AOx2, cranial nerves grossly intact, no focal motor or sensory deficits   PSYCH: Affect appropriate    Medications:   Medications:    [Held by provider] insulin glargine  10 Units SubCUTAneous Nightly    albuterol sulfate HFA  2 puff Inhalation 4x daily    ipratropium  2 puff Inhalation 4x daily    [Held by provider] oxyCODONE  2.5 mg Oral TID    potassium chloride  10 mEq Oral BID    [Held by provider] glipiZIDE  2.5 mg Oral QAM AC    enoxaparin  30 mg SubCUTAneous Daily    azithromycin  500 mg IntraVENous Q24H    pantoprazole  40 mg IntraVENous BID    [Held by provider] gabapentin  300 mg Oral TID    [Held by provider] carBAMazepine  100 mg Oral BID    baricitinib  2 mg Oral Daily    dexamethasone  10 mg IntraVENous Q12H    metoprolol tartrate  50 mg Oral BID    [Held by provider] insulin lispro  0-12 Units SubCUTAneous TID WC    [Held by provider] insulin lispro  0-12 Units SubCUTAneous 2 times per day    sodium chloride flush  5-40 mL IntraVENous 2 times per day    Vitamin D  2,000 Units Oral Daily    aspirin  81 mg Oral Daily    famotidine  10 mg Oral Q48H      Infusions:    dextrose      dextrose 5 % with KCl 20 mEq 50 mL/hr at 02/05/22 1124     PRN Meds: glucose, 15 g, PRN  glucagon (rDNA), 1 mg, PRN  dextrose, 100 mL/hr, PRN  dextrose bolus (hypoglycemia), 125 mL, PRN   Or  dextrose bolus (hypoglycemia), 250 mL, PRN  sodium chloride flush, 5-40 mL, PRN  ondansetron, 4 mg, Q8H PRN   Or  ondansetron, 4 mg, Q6H PRN  polyethylene glycol, 17 g, Daily PRN  acetaminophen, 650 mg, Q6H PRN   Or  acetaminophen, 650 mg, Q6H PRN          Electronically signed by Nataliia Taylor MD on 2/7/2022 at 2:12 PM

## 2022-02-07 NOTE — PROGRESS NOTES
Result   Ill-defined left-greater-than-right bibasilar airspace disease most   consistent with pneumonia. Scheduled Medicines   Medications:    insulin glargine  10 Units SubCUTAneous Nightly    albuterol sulfate HFA  2 puff Inhalation 4x daily    ipratropium  2 puff Inhalation 4x daily    [Held by provider] oxyCODONE  2.5 mg Oral TID    potassium chloride  10 mEq Oral BID    glipiZIDE  2.5 mg Oral QAM AC    enoxaparin  30 mg SubCUTAneous Daily    azithromycin  500 mg IntraVENous Q24H    pantoprazole  40 mg IntraVENous BID    [Held by provider] gabapentin  300 mg Oral TID    [Held by provider] carBAMazepine  100 mg Oral BID    baricitinib  2 mg Oral Daily    dexamethasone  10 mg IntraVENous Q12H    metoprolol tartrate  50 mg Oral BID    insulin lispro  0-12 Units SubCUTAneous TID WC    insulin lispro  0-12 Units SubCUTAneous 2 times per day    sodium chloride flush  5-40 mL IntraVENous 2 times per day    Vitamin D  2,000 Units Oral Daily    aspirin  81 mg Oral Daily    famotidine  10 mg Oral Q48H      Infusions:    dextrose      dextrose 5 % with KCl 20 mEq 50 mL/hr at 02/05/22 1124         Objective:   Vitals: /64   Pulse 94   Temp 97.3 °F (36.3 °C) (Axillary)   Resp 23   Ht 6' (1.829 m)   Wt 158 lb 4.6 oz (71.8 kg)   SpO2 (!) 83%   BMI 21.47 kg/m²   General appearance: alert and cooperative with exam  Neck: no JVD or bruit  Thyroid : Normal lobes   Lungs: Has Vesicular Breath sounds   Heart:  regular rate and rhythm  Abdomen: soft, non-tender; bowel sounds normal; no masses,  no organomegaly  Musculoskeletal: Normal  Extremities: extremities normal, , no edema  Neurologic:  Awake, alert, oriented to name, place and time. Cranial nerves II-XII are grossly intact. Motor is  intact. Sensory is intact. ,  and gait is normal.    Assessment:     Patient Active Problem List:     Acute kidney injury ([facility] [address]     Diabetes mellitus type II mild       Bilateral pneumonia possibly Covid related            Plan:     1. Reviewed POC blood glucose . Labs and X ray results   2. Reviewed Current Medicines   3. On  Correction bolus Humalog/ Basal Lantus Insulin regime   4. Monitor Blood glucose frequently   5. Modified  the dose of Insulin/ other medicines as needed   6. Will follow     .      Aston Benton MD, MD

## 2022-02-07 NOTE — PROGRESS NOTES
pulmonary      SUBJECTIVE:  On NRB and o2 high     OBJECTIVE    VITALS:  BP (!) 164/76   Pulse 72   Temp 97.4 °F (36.3 °C) (Axillary)   Resp 20   Ht 6' (1.829 m)   Wt 158 lb 4.6 oz (71.8 kg)   SpO2 98%   BMI 21.47 kg/m²   HEAD AND FACE EXAM:  No throat injection, no active exudate,no thrush  NECK EXAM;No JVD, no masses, symmetrical  CHEST EXAM; Expansion equal and symmetrical, no masses  LUNG EXAM; Good breath sounds bilaterally.  There are expiratory wheezes both lungs, there are crackles at both lung bases  CARDIOVASCULAR EXAM: Positive S1 and S2, no S3 or S4, no clicks ,no murmurs  RIGHT AND LEFT LOWER EXTRIMITY EXAM: No edema, no swelling, no inflamation            LABS   Lab Results   Component Value Date    WBC 13.2 (H) 02/06/2022    HGB 11.8 (L) 02/06/2022    HCT 34.2 (L) 02/06/2022    MCV 89.8 02/06/2022     02/06/2022     Lab Results   Component Value Date    CREATININE 2.1 (H) 02/06/2022    BUN 58 (H) 02/06/2022     02/06/2022    K 3.8 02/06/2022     (H) 02/06/2022    CO2 17 (L) 02/06/2022     Lab Results   Component Value Date    INR 5.10 (HH) 02/01/2022    PROTIME 66.9 (H) 02/01/2022          Lab Results   Component Value Date    PHOS 2.6 02/05/2022    PHOS 3.3 02/04/2022    PHOS 3.4 02/03/2022        Recent Labs     02/05/22  0945   PH 7.41   PO2ART 39*   KJD1DRT 17.0*   O2SAT 74.6*         Wt Readings from Last 3 Encounters:   02/06/22 158 lb 4.6 oz (71.8 kg)   03/14/17 200 lb (90.7 kg)   02/19/16 200 lb 6.4 oz (90.9 kg)        EXAMINATION:   ONE XRAY VIEW OF THE CHEST       2/5/2022 9:51 am       COMPARISON:   02/01/2022       HISTORY:   ORDERING SYSTEM PROVIDED HISTORY: pneumonia   TECHNOLOGIST PROVIDED HISTORY:   Reason for exam:->pneumonia   Reason for Exam: pneumonia       FINDINGS:   Heart is normal in size.  Slightly diminished inspiratory effort compared to   the prior study.  Lower lobe hazy opacities greater on the left are again   noted compatible with pneumonia.  No pneumothorax.  No evidence of pleural   fluid.  Evidence of previous lower anterior cervical disc fusion.           Impression   Bilateral hazy parenchymal opacities greater on the left compatible with   pneumonia.       Slightly diminished inspiratory effort. ASSESMENT  Ac resp failure  covid pneumonia  Bact pneumonia        PLAN  1. cpm  2. Decadron and baricitinab  3. conto2  4.  Bipap/vapo as needed    2/7/2022  Marcus Delacruz MD, MSUMAN.

## 2022-02-07 NOTE — PROGRESS NOTES
Progress Note( Dr. Mc Patel)    Subjective:   Admit Date: 1/28/2022  PCP: Karlee Brooks MD    Admitted For : altered mental state with generalized weakness positive for Covid pneumonia    Consulted For: Better contr ol of blood glucose    Interval History: Feels somewhat better  Seem to be somewhat confused    Denies any chest pains,   Yes SOB . On  oxygen  Denies nausea or vomiting. No new bowel or bladder symptoms. No intake or output data in the 24 hours ending 02/06/22 1921    DATA    CBC:   Recent Labs     02/04/22  0254 02/05/22  0606 02/06/22  0611   WBC 10.5 11.7* 13.2*   HGB 11.2* 13.9 11.8*   * 165 158    CMP:  Recent Labs     02/04/22  0254 02/05/22  0606 02/06/22  0611    144 145   K 3.3* 4.3 3.8   * 114* 114*   CO2 16* 18* 17*   BUN 74* 61* 58*   CREATININE 2.0* 1.9* 2.1*   CALCIUM 7.8* 8.5 8.2*   PROT 5.1* 6.1* 5.5*   LABALBU 2.6*  2.6* 3.1* 2.7*   BILITOT 0.4 0.6 0.6   ALKPHOS 72 90 86   AST 29 28 36   ALT 29 29 31     Lipids: No results found for: CHOL, HDL, TRIG  Glucose:  Recent Labs     02/06/22  0640 02/06/22  1159 02/06/22  1629   POCGLU 114* 114* 72     GrskhbesopH4C:  Lab Results   Component Value Date    LABA1C 6.0 01/29/2022     High Sensitivity TSH:   Lab Results   Component Value Date    TSHHS 0.335 02/01/2022     Free T3: No results found for: FT3  Free T4:  Lab Results   Component Value Date    T4FREE 1.28 02/01/2022       CT HEAD WO CONTRAST   Final Result   No acute intracranial abnormality. RECOMMENDATIONS:   Unavailable         XR CHEST PORTABLE   Final Result   Bilateral hazy parenchymal opacities greater on the left compatible with   pneumonia. Slightly diminished inspiratory effort. XR CHEST PORTABLE   Final Result   Left basilar infiltrates concerning for pneumonia. XR CHEST PORTABLE   Final Result   Mild bibasilar airspace disease, greater on the left, most likely pneumonia.          VL DUP LOWER EXTREMITY VENOUS BILATERAL Final Result   Left anterior tibial vein is not visualized and patient trying to get out of   bed during the exam.      No definite deep venous thrombosis is demonstrated. NM LUNG SCAN PERFUSION ONLY   Final Result   Low Probability for Pulmonary Embolus. CT HEAD WO CONTRAST   Final Result   1. No acute intracranial abnormality. 2. Diffuse parenchymal volume loss with mild-to-moderate chronic white matter   microvascular ischemic changes. 3. Chronic lacunar infarct in the right thalamus. XR CHEST PORTABLE   Final Result   Ill-defined left-greater-than-right bibasilar airspace disease most   consistent with pneumonia.               Scheduled Medicines   Medications:    insulin glargine  10 Units SubCUTAneous Nightly    albuterol sulfate HFA  2 puff Inhalation 4x daily    ipratropium  2 puff Inhalation 4x daily    [Held by provider] oxyCODONE  2.5 mg Oral TID    potassium chloride  10 mEq Oral BID    glipiZIDE  2.5 mg Oral QAM AC    enoxaparin  30 mg SubCUTAneous Daily    azithromycin  500 mg IntraVENous Q24H    pantoprazole  40 mg IntraVENous BID    [Held by provider] gabapentin  300 mg Oral TID    [Held by provider] carBAMazepine  100 mg Oral BID    baricitinib  2 mg Oral Daily    dexamethasone  10 mg IntraVENous Q12H    metoprolol tartrate  50 mg Oral BID    insulin lispro  0-12 Units SubCUTAneous TID WC    insulin lispro  0-12 Units SubCUTAneous 2 times per day    sodium chloride flush  5-40 mL IntraVENous 2 times per day    Vitamin D  2,000 Units Oral Daily    aspirin  81 mg Oral Daily    famotidine  10 mg Oral Q48H      Infusions:    dextrose      dextrose 5 % with KCl 20 mEq 50 mL/hr at 02/05/22 1124         Objective:   Vitals: /64   Pulse 94   Temp 97.3 °F (36.3 °C) (Axillary)   Resp 23   Ht 6' (1.829 m)   Wt 158 lb 4.6 oz (71.8 kg)   SpO2 (!) 83%   BMI 21.47 kg/m²   General appearance: alert and cooperative with exam  Neck: no JVD or bruit  Thyroid : Normal lobes   Lungs: Has Vesicular Breath sounds   Heart:  regular rate and rhythm  Abdomen: soft, non-tender; bowel sounds normal; no masses,  no organomegaly  Musculoskeletal: Normal  Extremities: extremities normal, , no edema  Neurologic:  Awake, alert, oriented to name, place and time. Cranial nerves II-XII are grossly intact. Motor is  intact. Sensory is intact. ,  and gait is normal.    Assessment:     Patient Active Problem List:     Acute kidney injury ([facility] [address]     Diabetes mellitus type II mild       Bilateral pneumonia possibly Covid related            Plan:     1. Reviewed POC blood glucose . Labs and X ray results   2. Reviewed Current Medicines   3. On  Correction bolus Humalog/ Basal Lantus Insulin regime   4. Monitor Blood glucose frequently   5. Modified  the dose of Insulin/ other medicines as needed   6. Will follow     .      Jayson Hinton MD, MD

## 2022-02-08 LAB
GLUCOSE BLD-MCNC: 220 MG/DL (ref 70–99)
GLUCOSE BLD-MCNC: 238 MG/DL (ref 70–99)
GLUCOSE BLD-MCNC: 240 MG/DL (ref 70–99)
GLUCOSE BLD-MCNC: 244 MG/DL (ref 70–99)

## 2022-02-08 PROCEDURE — 94664 DEMO&/EVAL PT USE INHALER: CPT

## 2022-02-08 PROCEDURE — C9113 INJ PANTOPRAZOLE SODIUM, VIA: HCPCS | Performed by: HOSPITALIST

## 2022-02-08 PROCEDURE — 2580000003 HC RX 258: Performed by: HOSPITALIST

## 2022-02-08 PROCEDURE — 85025 COMPLETE CBC W/AUTO DIFF WBC: CPT

## 2022-02-08 PROCEDURE — 80053 COMPREHEN METABOLIC PANEL: CPT

## 2022-02-08 PROCEDURE — 6360000002 HC RX W HCPCS: Performed by: INTERNAL MEDICINE

## 2022-02-08 PROCEDURE — 6360000002 HC RX W HCPCS: Performed by: HOSPITALIST

## 2022-02-08 PROCEDURE — 97530 THERAPEUTIC ACTIVITIES: CPT

## 2022-02-08 PROCEDURE — 94761 N-INVAS EAR/PLS OXIMETRY MLT: CPT

## 2022-02-08 PROCEDURE — 97535 SELF CARE MNGMENT TRAINING: CPT

## 2022-02-08 PROCEDURE — 6370000000 HC RX 637 (ALT 250 FOR IP): Performed by: FAMILY MEDICINE

## 2022-02-08 PROCEDURE — 6370000000 HC RX 637 (ALT 250 FOR IP): Performed by: INTERNAL MEDICINE

## 2022-02-08 PROCEDURE — 94640 AIRWAY INHALATION TREATMENT: CPT

## 2022-02-08 PROCEDURE — 82962 GLUCOSE BLOOD TEST: CPT

## 2022-02-08 PROCEDURE — 6370000000 HC RX 637 (ALT 250 FOR IP): Performed by: NURSE PRACTITIONER

## 2022-02-08 PROCEDURE — 84145 PROCALCITONIN (PCT): CPT

## 2022-02-08 PROCEDURE — 97110 THERAPEUTIC EXERCISES: CPT

## 2022-02-08 PROCEDURE — 2700000000 HC OXYGEN THERAPY PER DAY

## 2022-02-08 PROCEDURE — 80048 BASIC METABOLIC PNL TOTAL CA: CPT

## 2022-02-08 PROCEDURE — 2500000003 HC RX 250 WO HCPCS: Performed by: INTERNAL MEDICINE

## 2022-02-08 PROCEDURE — 2580000003 HC RX 258: Performed by: FAMILY MEDICINE

## 2022-02-08 PROCEDURE — 1200000000 HC SEMI PRIVATE

## 2022-02-08 RX ORDER — TRAMADOL HYDROCHLORIDE 50 MG/1
25 TABLET ORAL ONCE
Status: COMPLETED | OUTPATIENT
Start: 2022-02-08 | End: 2022-02-08

## 2022-02-08 RX ORDER — DEXAMETHASONE SODIUM PHOSPHATE 4 MG/ML
6 INJECTION, SOLUTION INTRA-ARTICULAR; INTRALESIONAL; INTRAMUSCULAR; INTRAVENOUS; SOFT TISSUE EVERY 12 HOURS
Status: DISCONTINUED | OUTPATIENT
Start: 2022-02-08 | End: 2022-02-11

## 2022-02-08 RX ADMIN — BARICITINIB 2 MG: 2 TABLET, FILM COATED ORAL at 10:22

## 2022-02-08 RX ADMIN — Medication 2000 UNITS: at 10:28

## 2022-02-08 RX ADMIN — FAMOTIDINE 10 MG: 20 TABLET, FILM COATED ORAL at 10:21

## 2022-02-08 RX ADMIN — IPRATROPIUM BROMIDE 2 PUFF: 17 AEROSOL, METERED RESPIRATORY (INHALATION) at 16:06

## 2022-02-08 RX ADMIN — ALBUTEROL SULFATE 2 PUFF: 90 AEROSOL, METERED RESPIRATORY (INHALATION) at 16:05

## 2022-02-08 RX ADMIN — ASPIRIN 81 MG CHEWABLE TABLET 81 MG: 81 TABLET CHEWABLE at 10:21

## 2022-02-08 RX ADMIN — POTASSIUM CHLORIDE 10 MEQ: 20 TABLET, EXTENDED RELEASE ORAL at 20:37

## 2022-02-08 RX ADMIN — INSULIN GLARGINE 10 UNITS: 100 INJECTION, SOLUTION SUBCUTANEOUS at 22:00

## 2022-02-08 RX ADMIN — TRAMADOL HYDROCHLORIDE 25 MG: 50 TABLET, COATED ORAL at 00:56

## 2022-02-08 RX ADMIN — SODIUM CHLORIDE, PRESERVATIVE FREE 10 ML: 5 INJECTION INTRAVENOUS at 10:23

## 2022-02-08 RX ADMIN — SOYBEAN OIL 250 ML: 20 INJECTION, SOLUTION INTRAVENOUS at 18:15

## 2022-02-08 RX ADMIN — ALBUTEROL SULFATE 2 PUFF: 90 AEROSOL, METERED RESPIRATORY (INHALATION) at 08:44

## 2022-02-08 RX ADMIN — ASCORBIC ACID, VITAMIN A PALMITATE, CHOLECALCIFEROL, THIAMINE HYDROCHLORIDE, RIBOFLAVIN-5 PHOSPHATE SODIUM, PYRIDOXINE HYDROCHLORIDE, NIACINAMIDE, DEXPANTHENOL, ALPHA-TOCOPHEROL ACETATE, VITAMIN K1, FOLIC ACID, BIOTIN, CYANOCOBALAMIN: 200; 3300; 200; 6; 3.6; 6; 40; 15; 10; 150; 600; 60; 5 INJECTION, SOLUTION INTRAVENOUS at 18:18

## 2022-02-08 RX ADMIN — METOPROLOL TARTRATE 50 MG: 25 TABLET, FILM COATED ORAL at 10:29

## 2022-02-08 RX ADMIN — ENOXAPARIN SODIUM 30 MG: 100 INJECTION SUBCUTANEOUS at 10:21

## 2022-02-08 RX ADMIN — IPRATROPIUM BROMIDE 2 PUFF: 17 AEROSOL, METERED RESPIRATORY (INHALATION) at 19:14

## 2022-02-08 RX ADMIN — PANTOPRAZOLE SODIUM 40 MG: 40 INJECTION, POWDER, FOR SOLUTION INTRAVENOUS at 10:21

## 2022-02-08 RX ADMIN — INSULIN LISPRO 2 UNITS: 100 INJECTION, SOLUTION INTRAVENOUS; SUBCUTANEOUS at 17:39

## 2022-02-08 RX ADMIN — ALBUTEROL SULFATE 2 PUFF: 90 AEROSOL, METERED RESPIRATORY (INHALATION) at 11:49

## 2022-02-08 RX ADMIN — DEXAMETHASONE SODIUM PHOSPHATE 10 MG: 10 INJECTION INTRAMUSCULAR; INTRAVENOUS at 10:20

## 2022-02-08 RX ADMIN — ALBUTEROL SULFATE 2 PUFF: 90 AEROSOL, METERED RESPIRATORY (INHALATION) at 19:14

## 2022-02-08 RX ADMIN — IPRATROPIUM BROMIDE 2 PUFF: 17 AEROSOL, METERED RESPIRATORY (INHALATION) at 08:45

## 2022-02-08 RX ADMIN — AZITHROMYCIN MONOHYDRATE 500 MG: 500 INJECTION, POWDER, LYOPHILIZED, FOR SOLUTION INTRAVENOUS at 10:33

## 2022-02-08 RX ADMIN — ACETAMINOPHEN 650 MG: 325 TABLET ORAL at 10:37

## 2022-02-08 RX ADMIN — DEXAMETHASONE SODIUM PHOSPHATE 6 MG: 4 INJECTION, SOLUTION INTRAMUSCULAR; INTRAVENOUS at 20:37

## 2022-02-08 RX ADMIN — INSULIN LISPRO 2 UNITS: 100 INJECTION, SOLUTION INTRAVENOUS; SUBCUTANEOUS at 11:39

## 2022-02-08 RX ADMIN — PANTOPRAZOLE SODIUM 40 MG: 40 INJECTION, POWDER, FOR SOLUTION INTRAVENOUS at 20:37

## 2022-02-08 RX ADMIN — IPRATROPIUM BROMIDE 2 PUFF: 17 AEROSOL, METERED RESPIRATORY (INHALATION) at 11:50

## 2022-02-08 RX ADMIN — POTASSIUM CHLORIDE 10 MEQ: 20 TABLET, EXTENDED RELEASE ORAL at 10:23

## 2022-02-08 ASSESSMENT — PAIN SCALES - GENERAL
PAINLEVEL_OUTOF10: 0
PAINLEVEL_OUTOF10: 6
PAINLEVEL_OUTOF10: 2
PAINLEVEL_OUTOF10: 6

## 2022-02-08 NOTE — PROGRESS NOTES
Comprehensive Nutrition Assessment    Type and Reason for Visit:  Reassess,Consult (Parental Nutrition Order and Management)    Nutrition Recommendations/Plan:     If medically appropriate with oxygen status/clinical status would recommend placing NGT and starting tube feeding. Would recommend:     · Vital AF 1.2 (peptide based formula) with goal rate 50 mL/hr to provide 1200 mL total volume, 1440 kcal, 90 grams of protein and 973 mL free water      If TPN started recommend:     · 4.25/10 formula with goal rate 84 mL/hr and standard lipids to provide an average of 1305 kcal and 85 grams of protein daily. Nutrition Assessment:  Pt with increased confusion and weakness. Pt currently on nasal cannula. NGT placement for tube feeding would be preferred if medically able with oxygen status to promote GI integrity. Consulted for PN at this time. RD cannot order and manage, only pharmacy. RD will provide recommendations, notified pharmacy of consult. Sent provider perfect serve regarding nutrition recommendations. Malnutrition Assessment:  Malnutrition Status:  Insufficient data    Context:  Acute Illness       Estimated Daily Nutrient Needs:  Energy (kcal):  0102-0119; Weight Used for Energy Requirements:  Current     Protein (g):  80-90; Weight Used for Protein Requirements:  Ideal        Fluid (ml/day):  1500; Method Used for Fluid Requirements:  1 ml/kcal      Nutrition Related Findings:  Na 147, BG , episode of hypoglycemia to the 30's. Wounds:  None       Current Nutrition Therapies:    ADULT ORAL NUTRITION SUPPLEMENT; Breakfast, Lunch; Fortified Pudding Oral Supplement  ADULT ORAL NUTRITION SUPPLEMENT; Lunch, Dinner; Frozen Oral Supplement  ADULT DIET; Dysphagia - Pureed;  Moderately Thick (Honey)    Anthropometric Measures:  · Height: 6' (182.9 cm)  · Current Body Weight: 158 lb 4.6 oz (71.8 kg)   · Admission Body Weight: 157 lb (71.2 kg) (ADELE)    · Usual Body Weight:  (ADELE)     · Ideal Body Weight: 178 lbs; % Ideal Body Weight 88.2 %   · BMI: 21.5  · BMI Categories: Underweight (BMI less than 22) age over 72       Nutrition Diagnosis:   · Inadequate oral intake related to acute injury/trauma as evidenced by intake 0-25%      Nutrition Interventions:   Food and/or Nutrient Delivery:  Continue Current Diet,Continue Oral Nutrition Supplement (start nutrition support EN vs. PN)  Nutrition Education/Counseling:  No recommendation at this time   Coordination of Nutrition Care:  Continue to monitor while inpatient,Feeding Assistance/Environment Change,Speech Therapy,Swallow Evaluation    Goals:  Pt will consume at least half of his meals and supplements       Nutrition Monitoring and Evaluation:   Behavioral-Environmental Outcomes:  None Identified   Food/Nutrient Intake Outcomes:  Food and Nutrient Intake,Supplement Intake  Physical Signs/Symptoms Outcomes:  Biochemical Data,Chewing or Swallowing,GI Status,Meal Time Behavior,Nutrition Focused Physical Findings,Weight     Discharge Planning:    Continue Oral Nutrition Supplement,Continue current diet     Electronically signed by Humberto Herndon RD, LD on 2/8/22 at 10:40 AM EST    Contact: [phone]

## 2022-02-08 NOTE — PROGRESS NOTES
pulmonary      SUBJECTIVE: on o2     OBJECTIVE    VITALS:  BP (!) 153/71   Pulse 86   Temp 97.7 °F (36.5 °C) (Axillary)   Resp 26   Ht 6' (1.829 m)   Wt 158 lb 4.6 oz (71.8 kg)   SpO2 100%   BMI 21.47 kg/m²   HEAD AND FACE EXAM:  No throat injection, no active exudate,no thrush  NECK EXAM;No JVD, no masses, symmetrical  CHEST EXAM; Expansion equal and symmetrical, no masses  LUNG EXAM; Good breath sounds bilaterally. There are expiratory wheezes both lungs, there are crackles at both lung bases  CARDIOVASCULAR EXAM: Positive S1 and S2, no S3 or S4, no clicks ,no murmurs  RIGHT AND LEFT LOWER EXTRIMITY EXAM: No edema, no swelling, no inflamation            LABS   Lab Results   Component Value Date    WBC 9.6 02/07/2022    HGB 12.4 (L) 02/07/2022    HCT 36.5 (L) 02/07/2022    MCV 89.9 02/07/2022     (L) 02/07/2022     Lab Results   Component Value Date    CREATININE 1.7 (H) 02/07/2022    BUN 50 (H) 02/07/2022     (H) 02/07/2022    K 4.5 02/07/2022     (H) 02/07/2022    CO2 14 (L) 02/07/2022     Lab Results   Component Value Date    INR 5.10 (HH) 02/01/2022    PROTIME 66.9 (H) 02/01/2022          Lab Results   Component Value Date    PHOS 2.6 02/05/2022    PHOS 3.3 02/04/2022    PHOS 3.4 02/03/2022        Recent Labs     02/05/22  0945   PH 7.41   PO2ART 39*   [id number] 17.0*   O2SAT 74.6*         Wt Readings from Last 3 Encounters:   02/06/22 158 lb 4.6 oz (71.8 kg)   03/14/17 200 lb (90.7 kg)   02/19/16 200 lb 6.4 oz (90.9 kg)               ASSESMENT  Ac resp failure  covid pneumonia  Bact superadded pneumonia        PLAN  1. cpm  2.  Cont o2 and try to decrease as rahul;erated    2/8/2022  Shereen Hazel MD, MSUMAN.

## 2022-02-08 NOTE — PLAN OF CARE
Nutrition Problem #1: Inadequate oral intake  Intervention: Food and/or Nutrient Delivery: Continue Current Diet,Continue Oral Nutrition Supplement (start nutrition support EN vs. PN)  Nutritional Goals: Pt will consume at least half of his meals and supplements

## 2022-02-08 NOTE — PROGRESS NOTES
Nephrology Progress Note  2/7/2022 9:47 PM  Subjective: Interval History: Ji Loaiza is a 64 y.o. male more confused and weak today    Data:   Scheduled Meds:   [Held by provider] insulin glargine  10 Units SubCUTAneous Nightly    albuterol sulfate HFA  2 puff Inhalation 4x daily    ipratropium  2 puff Inhalation 4x daily    [Held by provider] oxyCODONE  2.5 mg Oral TID    potassium chloride  10 mEq Oral BID    [Held by provider] glipiZIDE  2.5 mg Oral QAM AC    enoxaparin  30 mg SubCUTAneous Daily    azithromycin  500 mg IntraVENous Q24H    pantoprazole  40 mg IntraVENous BID    [Held by provider] gabapentin  300 mg Oral TID    [Held by provider] carBAMazepine  100 mg Oral BID    baricitinib  2 mg Oral Daily    dexamethasone  10 mg IntraVENous Q12H    metoprolol tartrate  50 mg Oral BID    [Held by provider] insulin lispro  0-12 Units SubCUTAneous TID WC    [Held by provider] insulin lispro  0-12 Units SubCUTAneous 2 times per day    sodium chloride flush  5-40 mL IntraVENous 2 times per day    Vitamin D  2,000 Units Oral Daily    aspirin  81 mg Oral Daily    famotidine  10 mg Oral Q48H     Continuous Infusions:   dextrose 30 mL/hr at 02/07/22 2058    dextrose      dextrose 5 % with KCl 20 mEq Stopped (02/07/22 1747)         CBC   Recent Labs     02/05/22  0606 02/06/22  0611 02/07/22  1657   WBC 11.7* 13.2* 9.6   HGB 13.9 11.8* 12.4*   HCT 40.2* 34.2* 36.5*    158 124*      BMP   Recent Labs     02/05/22  0606 02/06/22  0611 02/07/22  1657    145 147*   K 4.3 3.8 4.5   * 114* 118*   CO2 18* 17* 14*   PHOS 2.6  --   --    BUN 61* 58* 50*   CREATININE 1.9* 2.1* 1.7*     Hepatic:   Recent Labs     02/05/22  0606 02/06/22  0611 02/07/22  1657   AST 28 36 32   ALT 29 31 34   BILITOT 0.6 0.6 0.5   ALKPHOS 90 86 95     Troponin: No results for input(s): TROPONINI in the last 72 hours. BNP: No results for input(s): BNP in the last 72 hours.   Lipids: No results for input(s): CHOL, HDL in the last 72 hours. Invalid input(s): LDLCALCU  ABGs:   Lab Results   Component Value Date    PO2ART 39 02/05/2022    BJE1ZPA 17.0 02/05/2022     INR:   No results for input(s): INR in the last 72 hours. Renal Labs  Albumin:    Lab Results   Component Value Date    LABALBU 2.7 02/07/2022    LABALBU 100 05/30/2012     Calcium:    Lab Results   Component Value Date    CALCIUM 8.4 02/07/2022     Phosphorus:    Lab Results   Component Value Date    PHOS 2.6 02/05/2022     U/A:    Lab Results   Component Value Date    NITRU NEGATIVE 02/04/2022    COLORU YELLOW 02/04/2022    WBCUA 2 02/04/2022    RBCUA <1 02/04/2022    MUCUS RARE 01/31/2022    BACTERIA RARE 02/04/2022    CLARITYU CLEAR 02/04/2022    SPECGRAV 1.020 02/04/2022    UROBILINOGEN 0.2 02/04/2022    BILIRUBINUR NEGATIVE 02/04/2022    BLOODU SMALL 02/04/2022    KETUA NEGATIVE 02/04/2022     ABG:    Lab Results   Component Value Date    LLD1NUO 17.0 02/05/2022    PO2ART 39 02/05/2022    KJH1ORO 10.8 02/05/2022     HgBA1c:    Lab Results   Component Value Date    LABA1C 6.0 01/29/2022     Microalbumen/Creatinine ratio:  No components found for: RUCREAT  TSH:  No results found for: TSH  IRON:  No results found for: IRON  Iron Saturation:  No components found for: PERCENTFE  TIBC:  No results found for: TIBC  FERRITIN:    Lab Results   Component Value Date    FERRITIN 2,498 01/28/2022     RPR:  No results found for: RPR  JEREMIE:  No results found for: ANATITER, JEREMIE  24 Hour Urine for Creatinine Clearance:  No components found for: CREAT4, UHRS10, UTV10      Objective:   I/O: No intake/output data recorded. No intake/output data recorded. No intake/output data recorded. Vitals: BP (!) 153/71   Pulse 86   Temp 97.7 °F (36.5 °C) (Axillary)   Resp 26   Ht 6' (1.829 m)   Wt 158 lb 4.6 oz (71.8 kg)   SpO2 100%   BMI 21.47 kg/m²  {  General appearance: awake weak  HEENT: Head: Normal, normocephalic, atraumatic.   Neck: supple, symmetrical, trachea midline  Lungs: diminished breath sounds bilaterally  Heart: S1, S2 normal  Abdomen: abnormal findings:  soft nt  Extremities: edema trace  Neurologic: Mental status: alertness: Awake        Assessment and Plan:      IMP:  #1 COVID-19 pneumonia  #2 hypernatremia  #3 acute renal failure from ATN on CKD 3 creatinine 1.6  #4 type 2 diabetes  #5 hypertension  #6 weakness with some mild confusion    Plan     #1 treat for Covid as per protocols  #2 sodium holding stable  #3 creatinine 1.7 we will monitor  #4 glucose control  #5 blood pressure somewhat increased but also agitated  #6 monitor confusion above setting as that is what we need to improve         Cosmo Fernandez MD, MD

## 2022-02-08 NOTE — PROGRESS NOTES
Progress Note( Dr. Brandt Kapoor)    Subjective:   Admit Date: 1/28/2022  PCP: Uzair Watkins MD    Admitted For : altered mental state with generalized weakness positive for Covid pneumonia    Consulted For: Better contr ol of blood glucose    Interval History: Feels worse   Patient appeared to be confused  Has been running blood glucose levels lower levels hyper glycemic levels also some time  1 numbers however 400 but doubt very much being correct    Denies any chest pains,   Yes SOB . On  oxygen  Denies nausea or vomiting. Eating much  No new bowel or bladder symptoms. No intake or output data in the 24 hours ending 02/07/22 2303    DATA    CBC:   Recent Labs     02/05/22  0606 02/06/22  0611 02/07/22  1657   WBC 11.7* 13.2* 9.6   HGB 13.9 11.8* 12.4*    158 124*    CMP:  Recent Labs     02/05/22  0606 02/06/22  0611 02/07/22  1657    145 147*   K 4.3 3.8 4.5   * 114* 118*   CO2 18* 17* 14*   BUN 61* 58* 50*   CREATININE 1.9* 2.1* 1.7*   CALCIUM 8.5 8.2* 8.4   PROT 6.1* 5.5* 5.6*   LABALBU 3.1* 2.7* 2.7*   BILITOT 0.6 0.6 0.5   ALKPHOS 90 86 95   AST 28 36 32   ALT 29 31 34     Lipids: No results found for: CHOL, HDL, TRIG  Glucose:  Recent Labs     02/07/22  1139 02/07/22  1749 02/07/22  2057   POCGLU 94 131* 100*     QfinvnzcbsQ2F:  Lab Results   Component Value Date    LABA1C 6.0 01/29/2022     High Sensitivity TSH:   Lab Results   Component Value Date    TSHHS 0.335 02/01/2022     Free T3: No results found for: FT3  Free T4:  Lab Results   Component Value Date    T4FREE 1.28 02/01/2022       CT HEAD WO CONTRAST   Final Result   No acute intracranial abnormality. RECOMMENDATIONS:   Unavailable         XR CHEST PORTABLE   Final Result   Bilateral hazy parenchymal opacities greater on the left compatible with   pneumonia. Slightly diminished inspiratory effort. XR CHEST PORTABLE   Final Result   Left basilar infiltrates concerning for pneumonia.          XR CHEST

## 2022-02-08 NOTE — PROGRESS NOTES
Hospitalist Progress Note      Name:  Sridevi Terry /Age/Sex: 1945  (64 y.o. male)   MRN & CSN:  [id number] & [id number] Admission Date/Time: 2022  1:32 PM   Location:  [address] [place] [address] PCP: Patt Mora MD       Hospital Day: 12    Assessment and Plan:     Patient is 28-year-old male past medical history of chronic pain syndrome, chronic oxycodone use, diabetes, hypertension, hyperlipidemia who was admitted for confusion, generalized weakness. Patient was found to have COVID-19 pneumonia. Patient continues to require oxygen. He is on  baricitinib, dexamethasone and antibiotics. He had unwitnessed fall  night,CT head neg. #.  Acute hypoxic respiratory failure due to viral pneumonia from COVID-19  #. Viral pneumonia from COVID-19  Patient oxygen requirement worsened to 15 L nonrebreather but now slowly improving. He is down to 3L HF NC. Continue bronchodilators and wean of oxygen as tolerated  Continue dexamethasone,but will decrease dose from 10 to 6 mg bid. Continue baricitinib  Continue antibiotics,plan to stop after 7-10 days. Repeat CXR -Bilateral hazy parenchymal opacities greater on the left compatible with pneumonia. WBC normal.LFT's normal.  CRP up at 148, procalcitonin 0.465. Continue to trend. Pulm following . Monitor. #.  Encephalopathy, likely metabolic  Suspect metabolic encephalopathy: Due to COVID-19 pneumonia,hypoxia and meds. Discontinued restraints  Continue to reorient patient  Check ammonia level in AM.  Hold CNS depressant meds including Neurontin, Tegretol and Roxicodone. Nephrology recommended to hold Tegretol. Monitor  CT head 22 neg. #.  Acute kidney injury likely due to ATN likely due to prerenal azotemia  #. Hypernatremia   # Metabolic acidosis. Nephrology following  Sodium increased at 147 today  Cr with some slight fluctuations but better.      #.  Sinus tachycardia/junctional tachycardia  Cardiology following  Continue metoprolol. Improved. #.  Generalized weakness and physical deconditioning  PT/OT  ? Rehab once medically stable for discharge     #. Other medical problems include hypertension, hyperlipidemia, DM with hyperglycemia  Continue current medications      # Poor oral intake  Will start on TPN and monitor. DVT prophylaxis: Lovenox  CODE STATUS: Total support    Subjective. :     Patient was seen and examined this morning . Wife at bedside and updated. States pt not eating much. Looks weak and chronically ill. Objective:   Vitals:   Vitals:    02/08/22 1016   BP: 126/76   Pulse: 87   Resp: 19   Temp: 97.3 °F (36.3 °C)   SpO2: 92%       Physical Exam:   GEN: Awake, alert, in no apparent distress. O2 NC   HEENT: Atraumatic, normocephalic, PERRLA, EOMI  NECK: Supple, no apparent thyromegaly or masses. RESP: Bilateral good air entry, slightly diminished at the bases. CARDIO/VASC:Regular rate and rhythm, normal S1, S2, no murmurs  GI: Abdomen is soft, nontender, no significant organomegaly noted, bowel sounds present  MSK: No gross joint deformities.   Skin: No significant rashes, skin lesions noted  Neuro: AOx2, cranial nerves grossly intact, no focal motor or sensory deficits   PSYCH: Affect appropriate    Medications:   Medications:    insulin lispro  0-6 Units SubCUTAneous TID WC    insulin lispro  0-3 Units SubCUTAneous 2 times per day    metoprolol tartrate  50 mg Oral BID    dexamethasone  6 mg IntraVENous Q12H    [Held by provider] insulin glargine  10 Units SubCUTAneous Nightly    albuterol sulfate HFA  2 puff Inhalation 4x daily    ipratropium  2 puff Inhalation 4x daily    [Held by provider] oxyCODONE  2.5 mg Oral TID    potassium chloride  10 mEq Oral BID    [Held by provider] glipiZIDE  2.5 mg Oral QAM AC    enoxaparin  30 mg SubCUTAneous Daily    azithromycin  500 mg IntraVENous Q24H    pantoprazole  40 mg IntraVENous BID    [Held by provider] gabapentin  300 mg Oral TID    [Held by provider] carBAMazepine  100 mg Oral BID    baricitinib  2 mg Oral Daily    sodium chloride flush  5-40 mL IntraVENous 2 times per day    Vitamin D  2,000 Units Oral Daily    aspirin  81 mg Oral Daily    famotidine  10 mg Oral Q48H      Infusions:    dextrose      dextrose 5 % with KCl 20 mEq 30 mL/hr at 02/08/22 0924     PRN Meds: glucose, 15 g, PRN  glucagon (rDNA), 1 mg, PRN  dextrose, 100 mL/hr, PRN  dextrose bolus (hypoglycemia), 125 mL, PRN   Or  dextrose bolus (hypoglycemia), 250 mL, PRN  sodium chloride flush, 5-40 mL, PRN  ondansetron, 4 mg, Q8H PRN   Or  ondansetron, 4 mg, Q6H PRN  polyethylene glycol, 17 g, Daily PRN  acetaminophen, 650 mg, Q6H PRN   Or  acetaminophen, 650 mg, Q6H PRN          Electronically signed by Alberto Walker MD on 2/8/2022 at 1:18 PM

## 2022-02-08 NOTE — PROGRESS NOTES
Physical Therapy    Physical Therapy Treatment Note  Name: Ricardo Grayson MRN: [id number] :   1945   Date:  2022   Admission Date: 2022 Room:  [address] [place] [address]   Restrictions/Precautions:        droplet plus   Communication with other providers:  Vanessa Bentley for cotx. STEVEN Rodrigues present in room at [facility]. Informed RN of patient's inability to use straw and to swallow honey thick water today. Subjective:  Patient states:   Pain:   Location, Type, Intensity (0/10 to 10/10): Denies   Objective:    Observation:    Supine in bed in low position, no gown. Intermittently groaning throughout session. No oriented today. SpO2 variable with questionable reading on pulse ox. Steven Rodrigues informs that patient was on 2L of NC O2. Patient O@ saturation ~75% during session and O2 flow increased to 7L and saturation improved to ~85%. Treatment, including education/measures:  Bed Mobility today : Rolling Max A x2 3sets for clean up, gown donning, sheet and viviana change. Supine Scooting depend x2 up to [facility]   PROM: Patient resists all ankle, knee an hip motions today. Small wounds on Lt toes. Assessment / Impression:  Patient has poor tolerance today with therapy and has had a moderate decline. Unable to provide comprehensive speech. He accepted honey thick liquids from WILLIS today but struggled to completely swallow. Unable to suck through straw and needed spoon fed liquids. Unable to follow max verbal and manual cues today.    Patient's tolerance of treatment:  Good   Adverse Reaction: drop in SPO2  Significant change in status and impact: cognitive and functional decline  Barriers to improvement:  Activity tolerance, strength, balance, cognition   Plan for Next Session:    Activity tolerance, strength, balance, gait, transfers, bed mobility   Time in:  921  Time out:  1010  Timed treatment minutes:  49  Total treatment time:  52    Previously filed items:  Social/Functional History  Additional Comments: Pt nonverbal and unable to answer questions on this date. Short term goals  Time Frame for Short term goals: 1 week  Short term goal 1: Pt will perform bed mobility modA w elevated HOB and use of rail  Short term goal 2: Pt will perform STS to/from EOB, chair, and toilet maxA and LRAD  Short term goal 3: Pt will perform bed<>chair transfer maxA and LRAD       Electronically signed by:     Franki Wyatt, [id number]  2/8/2022, 11:05 AM

## 2022-02-08 NOTE — CONSULTS
Pharmacy to dose TPN per Dr. Yari Segal   TPN Day # 1     Indication: Poor oral intake    Goal: AA 4.25% + DEX 10% @84 ml/hr  (per dietician)    Central line: none (peripheral line only)    Diet: Dysphagia, pureed, moderately thick     Maintenance Fluids: D5W with KCL 20meq @50 ml/hr       GLUCOSE LEVELS LAST 72 HOURS                  (last 7 readings)    Recent Labs     02/06/22  0611 02/06/22  0640 02/06/22 2022 02/07/22  0254 02/07/22  1139 02/07/22  1657 02/07/22  1749 02/07/22 2057 02/08/22  0556 02/08/22  1134   POCGLU  --    < > 91 493* 94  --  131* 100* 244* 220*   GLUCOSE 106*  --   --   --   --  166*  --   --   --   --     < > = values in this interval not displayed. Glucose:  Goal <180   Last 2 reading above goal   Previously on Lantus 10 units nightly per Dr. Sai Baxter (now on hold)  Low dose sliding scale insulin coverage (per Dr. Sai Baxter)  No insulin in the TPN formula at this time, Dr. Sai Baxter may adjust.      TRIGLYCERIDES:  No results found for: TRIG    Triglycerides:  Goal < 400   TG level ordered for tomorrow then once weekly  Patient is not currently receiving propofol  Continue standard lipid replacement at this time       LIVER FUNCTION LAB EVALUATION  Recent Labs     02/07/22  1657   AST 32   ALT 34   ALKPHOS 95   BILITOT 0.5     Hepatic Function:  Previously WNL  Bilitotal previously <3, will add trace elements to the TPN        TPN LAB EVALUATION  Recent Labs     02/06/22  0611 02/06/22  0611 02/07/22  1657      < > 147*   K 3.8   < > 4.5   *   < > 118*   CALCIUM 8.2*   < > 8.4   GLUCOSE 106*  --  166*    < > = values in this interval not displayed. RENAL LAB EVALUATION  Estimated Creatinine Clearance: 36 mL/min (A) (based on SCr of 1.7 mg/dL (H)).   Recent Labs     02/06/22  0611 02/07/22  1657   BUN 58* 50*   CREATININE 2.1* 1.7*     Renal Function and Electrolytes:  Na - previously slightly high  K - WNL (main IV with KCL running)  Ca - WNL  Mg - level ordered for tomorrow am  Phos - level ordered for tomorrow am  No labs to review today, BMP, mag, phos, and triglycerides ordered for tomorrow am  MARCO on CKD3, nephrology following. Formulation: Start AA 4.25 + Dex 10% with no electrolytes at this time at 42 ml/hr. Standard lipids four times weekly. Component Order Dose Admin Dose   CLINIMIX 4.25/10 4.25 % Soln 2,000 mLs 2,000 mL   adult multi-vitamin with vitamin k Inj 10 mLs 10 mL   trace minerals Cu-Mn-Se-Zn 833-53- MCG/ML Soln 1 mL 1 mL       Pharmacy will continue to follow and adjust as appropriate. Thank you for the consult,    Meghann Hunt.  Bushra Finch, [facility]  2/8/2022 @ 1:37 PM

## 2022-02-08 NOTE — PROGRESS NOTES
Nephrology Progress Note  2/8/2022 4:25 PM  Subjective: Interval History: Samm Flores is a 64 y.o. male weak agitated confused  Data:   Scheduled Meds:   insulin lispro  0-6 Units SubCUTAneous TID WC    insulin lispro  0-3 Units SubCUTAneous 2 times per day    metoprolol tartrate  50 mg Oral BID    dexamethasone  6 mg IntraVENous Q12H    fat emulsion  250 mL IntraVENous Once per day on Mon Tue Thu Fri    [Held by provider] insulin glargine  10 Units SubCUTAneous Nightly    albuterol sulfate HFA  2 puff Inhalation 4x daily    ipratropium  2 puff Inhalation 4x daily    [Held by provider] oxyCODONE  2.5 mg Oral TID    potassium chloride  10 mEq Oral BID    [Held by provider] glipiZIDE  2.5 mg Oral QAM AC    enoxaparin  30 mg SubCUTAneous Daily    azithromycin  500 mg IntraVENous Q24H    pantoprazole  40 mg IntraVENous BID    [Held by provider] gabapentin  300 mg Oral TID    [Held by provider] carBAMazepine  100 mg Oral BID    baricitinib  2 mg Oral Daily    sodium chloride flush  5-40 mL IntraVENous 2 times per day    Vitamin D  2,000 Units Oral Daily    aspirin  81 mg Oral Daily    famotidine  10 mg Oral Q48H     Continuous Infusions:   PN-Adult Premix 4.25/10 - Peripheral Line      dextrose      dextrose 5 % with KCl 20 mEq 30 mL/hr at 02/08/22 0924         CBC   Recent Labs     02/06/22  0611 02/07/22  1657   WBC 13.2* 9.6   HGB 11.8* 12.4*   HCT 34.2* 36.5*    124*      BMP   Recent Labs     02/06/22  0611 02/07/22  1657    147*   K 3.8 4.5   * 118*   CO2 17* 14*   BUN 58* 50*   CREATININE 2.1* 1.7*     Hepatic:   Recent Labs     02/06/22  0611 02/07/22  1657   AST 36 32   ALT 31 34   BILITOT 0.6 0.5   ALKPHOS 86 95     Troponin: No results for input(s): TROPONINI in the last 72 hours. BNP: No results for input(s): BNP in the last 72 hours. Lipids: No results for input(s): CHOL, HDL in the last 72 hours.     Invalid input(s): LDLCALCU  ABGs:   Lab Results Component Value Date    PO2ART 39 02/05/2022    IZD5NQJ 17.0 02/05/2022     INR:   No results for input(s): INR in the last 72 hours. Renal Labs  Albumin:    Lab Results   Component Value Date    LABALBU 2.7 02/07/2022    LABALBU 100 05/30/2012     Calcium:    Lab Results   Component Value Date    CALCIUM 8.4 02/07/2022     Phosphorus:    Lab Results   Component Value Date    PHOS 2.6 02/05/2022     U/A:    Lab Results   Component Value Date    NITRU NEGATIVE 02/04/2022    COLORU YELLOW 02/04/2022    WBCUA 2 02/04/2022    RBCUA <1 02/04/2022    MUCUS RARE 01/31/2022    BACTERIA RARE 02/04/2022    CLARITYU CLEAR 02/04/2022    SPECGRAV 1.020 02/04/2022    UROBILINOGEN 0.2 02/04/2022    BILIRUBINUR NEGATIVE 02/04/2022    BLOODU SMALL 02/04/2022    KETUA NEGATIVE 02/04/2022     ABG:    Lab Results   Component Value Date    HQF9DUA 17.0 02/05/2022    PO2ART 39 02/05/2022    UKH2VSH 10.8 02/05/2022     HgBA1c:    Lab Results   Component Value Date    LABA1C 6.0 01/29/2022     Microalbumen/Creatinine ratio:  No components found for: RUCREAT  TSH:  No results found for: TSH  IRON:  No results found for: IRON  Iron Saturation:  No components found for: PERCENTFE  TIBC:  No results found for: TIBC  FERRITIN:    Lab Results   Component Value Date    FERRITIN 2,498 01/28/2022     RPR:  No results found for: RPR  JEREMIE:  No results found for: ANATITER, JEREMIE  24 Hour Urine for Creatinine Clearance:  No components found for: CREAT4, UHRS10, UTV10      Objective:   I/O: No intake/output data recorded. No intake/output data recorded. No intake/output data recorded. Vitals: /80   Pulse 73   Temp 97.3 °F (36.3 °C) (Axillary)   Resp 19   Ht 6' (1.829 m)   Wt 158 lb 4.6 oz (71.8 kg)   SpO2 98%   BMI 21.47 kg/m²  {  General appearance: awake weak  HEENT: Head: Normal, normocephalic, atraumatic.   Neck: supple, symmetrical, trachea midline  Lungs: diminished breath sounds bilaterally  Heart: S1, S2 normal  Abdomen: abnormal

## 2022-02-08 NOTE — PROGRESS NOTES
Occupational Therapy  . Occupational Therapy Treatment Note      Name: Maurice Sifuentes MRN: [id number] :   1945   Date:  2022   Admission Date: 2022 Room:  [address] [place] [address]-A     Primary Problem:      Restrictions/Precautions:    General precautions, fall risk    Droplet plus    Communication with other providers:  cotx with PTA Roderick Salinas for assist and safety. Updated nurse on patients behavior on o2 needs    Subjective:  Patient states:  Nonsensical words. Pain: none stated (location, type, intensity)    Objective:    Observation:  patient supine. Stiff and rigid. Confused. Groaning throughout session. resisitive to therapy. Patient with decreased conversation compared to last visit, decreased cognition, increased oxygen needs. Objective Measures:  02-2L initially, nurse increased to 5L, [facility]IS increased to 7L d/t desat to 77%-84%    Treatment, including education:    ADL activity training was instructed today. Cues were given for safety, sequence, UE/LE placement, visual cues, and balance. Activities performed today included dressing, toileting, hand hygiene, and grooming. Feeding- DEP. patient unable to use straw despite cues, required spooned thickened water, but patient still having trouble swallowing and at times choked. Facial hygiene- DEP  LB dressing- DEP for socks  DEP for robe change  toileting- DEP for diaper change    Therapeutic activity training was instructed today. Cues were given for safety, sequence, UE/LE placement, awareness, and balance. Activities performed today included bed mobility training,     Rolling- DEP  Boost to [facility]- DEP    Patient educated on role of OT , benefits of OT and rationale for therapeutic intervention. All therapeutic intervention performed c emphasis on BUE/BLE strengthening and endurance to  increase strength for functional tasks / transfers. Patient left safely in bed at end of session, with call light in reach, alarm on and nursing aware. Assessment / Impression:    Patient's tolerance of treatment: poor  Adverse Reaction: none  Significant change in status and impact:  none  Barriers to improvement: weakness, confusion, spo2      Plan for Next Session:    Continue with OT POC      Time in:  921  Time out:  1010  Timed treatment minutes:  49  Total treatment time:  49      Electronically signed by:    KELLY Parrish COTA/L [id number]    2/8/2022, 11:05 AM

## 2022-02-09 ENCOUNTER — APPOINTMENT (OUTPATIENT)
Dept: GENERAL RADIOLOGY | Age: 77
DRG: 177 | End: 2022-02-09
Payer: MEDICARE

## 2022-02-09 LAB
AMMONIA: 17 UMOL/L (ref 16–60)
ANION GAP SERPL CALCULATED.3IONS-SCNC: 13 MMOL/L (ref 4–16)
BASE EXCESS: 8 (ref 0–3.3)
BUN BLDV-MCNC: 49 MG/DL (ref 6–23)
CALCIUM SERPL-MCNC: 8.3 MG/DL (ref 8.3–10.6)
CARBON MONOXIDE, BLOOD: 1.5 % (ref 0–5)
CHLORIDE BLD-SCNC: 116 MMOL/L (ref 99–110)
CO2 CONTENT: 15.9 MMOL/L (ref 19–24)
CO2: 16 MMOL/L (ref 21–32)
COMMENT: ABNORMAL
CREAT SERPL-MCNC: 1.8 MG/DL (ref 0.9–1.3)
FOLATE: >20 NG/ML (ref 3.1–17.5)
GFR AFRICAN AMERICAN: 44 ML/MIN/1.73M2
GFR NON-AFRICAN AMERICAN: 37 ML/MIN/1.73M2
GLUCOSE BLD-MCNC: 139 MG/DL (ref 70–99)
GLUCOSE BLD-MCNC: 178 MG/DL (ref 70–99)
GLUCOSE BLD-MCNC: 182 MG/DL (ref 70–99)
GLUCOSE BLD-MCNC: 214 MG/DL (ref 70–99)
GLUCOSE BLD-MCNC: 256 MG/DL (ref 70–99)
GLUCOSE BLD-MCNC: 274 MG/DL (ref 70–99)
HCO3 ARTERIAL: 15.1 MMOL/L (ref 18–23)
MAGNESIUM: 2.2 MG/DL (ref 1.8–2.4)
METHEMOGLOBIN ARTERIAL: 1.2 %
O2 SATURATION: 96.8 % (ref 96–97)
PCO2 ARTERIAL: 25 MMHG (ref 32–45)
PH BLOOD: 7.39 (ref 7.34–7.45)
PHOSPHORUS: 2.9 MG/DL (ref 2.5–4.9)
PO2 ARTERIAL: 121 MMHG (ref 75–100)
POTASSIUM SERPL-SCNC: 4.3 MMOL/L (ref 3.5–5.1)
SODIUM BLD-SCNC: 145 MMOL/L (ref 135–145)
TRIGL SERPL-MCNC: 190 MG/DL
VITAMIN B-12: 790.8 PG/ML (ref 211–911)

## 2022-02-09 PROCEDURE — 82607 VITAMIN B-12: CPT

## 2022-02-09 PROCEDURE — 84478 ASSAY OF TRIGLYCERIDES: CPT

## 2022-02-09 PROCEDURE — 94761 N-INVAS EAR/PLS OXIMETRY MLT: CPT

## 2022-02-09 PROCEDURE — 6370000000 HC RX 637 (ALT 250 FOR IP): Performed by: FAMILY MEDICINE

## 2022-02-09 PROCEDURE — 92526 ORAL FUNCTION THERAPY: CPT

## 2022-02-09 PROCEDURE — 82746 ASSAY OF FOLIC ACID SERUM: CPT

## 2022-02-09 PROCEDURE — 84100 ASSAY OF PHOSPHORUS: CPT

## 2022-02-09 PROCEDURE — 36600 WITHDRAWAL OF ARTERIAL BLOOD: CPT

## 2022-02-09 PROCEDURE — 6370000000 HC RX 637 (ALT 250 FOR IP): Performed by: HOSPITALIST

## 2022-02-09 PROCEDURE — 2580000003 HC RX 258: Performed by: INTERNAL MEDICINE

## 2022-02-09 PROCEDURE — 2580000003 HC RX 258: Performed by: FAMILY MEDICINE

## 2022-02-09 PROCEDURE — 99223 1ST HOSP IP/OBS HIGH 75: CPT | Performed by: INTERNAL MEDICINE

## 2022-02-09 PROCEDURE — 83735 ASSAY OF MAGNESIUM: CPT

## 2022-02-09 PROCEDURE — 94640 AIRWAY INHALATION TREATMENT: CPT

## 2022-02-09 PROCEDURE — 6360000002 HC RX W HCPCS: Performed by: HOSPITALIST

## 2022-02-09 PROCEDURE — 82140 ASSAY OF AMMONIA: CPT

## 2022-02-09 PROCEDURE — 6370000000 HC RX 637 (ALT 250 FOR IP): Performed by: INTERNAL MEDICINE

## 2022-02-09 PROCEDURE — 1200000000 HC SEMI PRIVATE

## 2022-02-09 PROCEDURE — 2500000003 HC RX 250 WO HCPCS: Performed by: INTERNAL MEDICINE

## 2022-02-09 PROCEDURE — 82962 GLUCOSE BLOOD TEST: CPT

## 2022-02-09 PROCEDURE — 82803 BLOOD GASES ANY COMBINATION: CPT

## 2022-02-09 PROCEDURE — 2580000003 HC RX 258: Performed by: HOSPITALIST

## 2022-02-09 PROCEDURE — 36415 COLL VENOUS BLD VENIPUNCTURE: CPT

## 2022-02-09 PROCEDURE — 6360000002 HC RX W HCPCS: Performed by: INTERNAL MEDICINE

## 2022-02-09 PROCEDURE — 2700000000 HC OXYGEN THERAPY PER DAY

## 2022-02-09 PROCEDURE — C9113 INJ PANTOPRAZOLE SODIUM, VIA: HCPCS | Performed by: HOSPITALIST

## 2022-02-09 PROCEDURE — 80048 BASIC METABOLIC PNL TOTAL CA: CPT

## 2022-02-09 PROCEDURE — 87040 BLOOD CULTURE FOR BACTERIA: CPT

## 2022-02-09 RX ORDER — SODIUM CHLORIDE 9 MG/ML
INJECTION, SOLUTION INTRAVENOUS CONTINUOUS
Status: DISCONTINUED | OUTPATIENT
Start: 2022-02-09 | End: 2022-02-10

## 2022-02-09 RX ADMIN — PANTOPRAZOLE SODIUM 40 MG: 40 INJECTION, POWDER, FOR SOLUTION INTRAVENOUS at 08:46

## 2022-02-09 RX ADMIN — Medication 1250 MG: at 17:06

## 2022-02-09 RX ADMIN — INSULIN LISPRO 1 UNITS: 100 INJECTION, SOLUTION INTRAVENOUS; SUBCUTANEOUS at 10:04

## 2022-02-09 RX ADMIN — ASPIRIN 81 MG CHEWABLE TABLET 81 MG: 81 TABLET CHEWABLE at 08:46

## 2022-02-09 RX ADMIN — SODIUM CHLORIDE: 9 INJECTION, SOLUTION INTRAVENOUS at 12:41

## 2022-02-09 RX ADMIN — POTASSIUM BICARBONATE 10 MEQ: 782 TABLET, EFFERVESCENT ORAL at 10:04

## 2022-02-09 RX ADMIN — ALBUTEROL SULFATE 2 PUFF: 90 AEROSOL, METERED RESPIRATORY (INHALATION) at 12:08

## 2022-02-09 RX ADMIN — DEXAMETHASONE SODIUM PHOSPHATE 6 MG: 4 INJECTION, SOLUTION INTRAMUSCULAR; INTRAVENOUS at 08:45

## 2022-02-09 RX ADMIN — PANTOPRAZOLE SODIUM 40 MG: 40 INJECTION, POWDER, FOR SOLUTION INTRAVENOUS at 21:08

## 2022-02-09 RX ADMIN — SODIUM CHLORIDE, PRESERVATIVE FREE 10 ML: 5 INJECTION INTRAVENOUS at 21:07

## 2022-02-09 RX ADMIN — ENOXAPARIN SODIUM 30 MG: 100 INJECTION SUBCUTANEOUS at 08:45

## 2022-02-09 RX ADMIN — INSULIN LISPRO 2 UNITS: 100 INJECTION, SOLUTION INTRAVENOUS; SUBCUTANEOUS at 17:13

## 2022-02-09 RX ADMIN — SODIUM CHLORIDE, PRESERVATIVE FREE 10 ML: 5 INJECTION INTRAVENOUS at 09:48

## 2022-02-09 RX ADMIN — Medication 2000 UNITS: at 08:46

## 2022-02-09 RX ADMIN — ACETAMINOPHEN 650 MG: 325 TABLET ORAL at 05:56

## 2022-02-09 RX ADMIN — METOPROLOL TARTRATE 50 MG: 25 TABLET, FILM COATED ORAL at 09:48

## 2022-02-09 RX ADMIN — AZITHROMYCIN MONOHYDRATE 500 MG: 500 INJECTION, POWDER, LYOPHILIZED, FOR SOLUTION INTRAVENOUS at 10:16

## 2022-02-09 RX ADMIN — INSULIN LISPRO 3 UNITS: 100 INJECTION, SOLUTION INTRAVENOUS; SUBCUTANEOUS at 12:41

## 2022-02-09 RX ADMIN — BARICITINIB 2 MG: 2 TABLET, FILM COATED ORAL at 08:47

## 2022-02-09 RX ADMIN — DEXAMETHASONE SODIUM PHOSPHATE 6 MG: 4 INJECTION, SOLUTION INTRAMUSCULAR; INTRAVENOUS at 21:07

## 2022-02-09 RX ADMIN — CEFEPIME HYDROCHLORIDE 2000 MG: 2 INJECTION, POWDER, FOR SOLUTION INTRAVENOUS at 15:31

## 2022-02-09 RX ADMIN — IPRATROPIUM BROMIDE 2 PUFF: 17 AEROSOL, METERED RESPIRATORY (INHALATION) at 12:08

## 2022-02-09 RX ADMIN — ASCORBIC ACID, VITAMIN A PALMITATE, CHOLECALCIFEROL, THIAMINE HYDROCHLORIDE, RIBOFLAVIN-5 PHOSPHATE SODIUM, PYRIDOXINE HYDROCHLORIDE, NIACINAMIDE, DEXPANTHENOL, ALPHA-TOCOPHEROL ACETATE, VITAMIN K1, FOLIC ACID, BIOTIN, CYANOCOBALAMIN: 200; 3300; 200; 6; 3.6; 6; 40; 15; 10; 150; 600; 60; 5 INJECTION, SOLUTION INTRAVENOUS at 17:13

## 2022-02-09 ASSESSMENT — PAIN SCALES - GENERAL
PAINLEVEL_OUTOF10: 5
PAINLEVEL_OUTOF10: 4

## 2022-02-09 ASSESSMENT — PAIN SCALES - WONG BAKER
WONGBAKER_NUMERICALRESPONSE: 2

## 2022-02-09 NOTE — CONSULTS
Infectious Disease Consult Note  2022   Patient Name: Ann Lindsey : 1945   Impression  Severe COVID-19 pneumonia with acute hypoxic respiratory failure. ? Probable bacterial infection  Metabolic acidosis  lymphopenia  Vaccinated:no  Date of symptom onset:2022  Date of positive SARS-CoV-2 NAAT/PCR:2022  Exposure: unknown  Oxygen supplementation: HFNC  Bacterial infection. Elevated pct and CRP  BMI:21.47 kg/m2  Encephalopathy:  MARCO: on ckd 3b   Multi-morbidity: per PMHx  Plan:   Therapeutic:d/c azithromycin, start vancomycin and cefepime    Dexamethasone    Diagnostic: d-dimer, MRSA nares, trend CRP and pct   F/u test:     Thank you for allowing me to consult in the care of this patient.  ------------------------  REASON FOR CONSULT: Infective syndrome   Requested by: Dr. Hassan Presume  History obtained from chart reviewand family member-Wife Alexandra Laureano as the patient is confused    HPI:Patient is a 64 y.o.  male with a medical hx of diabetes mellitus, hypertension, hyperlipidemia not vaccinated against COVID-19 who was admitted 2022 for further evaluation and management of confusion and weakness for about 2 weeks prior to admission. He also endorsed cough, poor appetite and diarrhea. He tested positive for SARS-CoV-2. He was treated with dexamethasone, azithromycin, and baricitinib. His oxygen needs improved and then worsened and improved once more. However, over the last couple of days they have worsened as well. The patient remains confused. His labs revealed elevated procalcitonin and CRP. ? Infectious diseases service was consulted to evaluate the pt, and recommend further investigative and therapeutic measures. Review and summary of old records:  ROS: Other systems reviewed Including eyes, ENT, respiratory, cardiovascular, GI, , dermatologic, neurologic, psych, hem/lymphatic, musculoskeletal and endocrine were negative other than what is mentioned above. Unable to do so as he is confused  Patient Active Problem List    Diagnosis Date Noted    Acute kidney injury ([facility] [address] 2022     Past Medical History:   Diagnosis Date    DM (diabetes mellitus) ([facility] [address]     Dysphagia     Dysphonia     Hyperlipidemia     Hypertension     Kidney failure     Osteoarthritis       Past Surgical History:   Procedure Laterality Date    CERVICAL SPINE SURGERY      3 years ago    ENDOSCOPY, COLON, DIAGNOSTIC  2016    mild inflammation at the squamocolumnar junction- biopsied, no hiatal hernia, diffuse gastric erythema- biopsies to rule out chronic gastritis or intestinal metaplasia      Family History   Problem Relation Age of Onset    Cancer Sister 61        colon cancer    Cancer Father 68        stomach cancer spread to liver      Infectious disease related family history - not contibutory. SOCIAL HISTORY  Social History     Tobacco Use    Smoking status: Former Smoker     Years: 50.00     Quit date: 2010     Years since quittin.1    Smokeless tobacco: Not on file   Substance Use Topics    Alcohol use: No        ?  ALLERGIES  No Known Allergies   MEDICATIONS  Reviewed and are per the chart/EMR. IMMUNIZATION HISTORY    There is no immunization history on file for this patient. ? Antibiotics:   Azithromycin  ?  -------------------------------------------------------------------------------------------------------------------    Vital Signs:  Vitals:    22 0843   BP: 110/65   Pulse: 97   Resp: 26   Temp: 97.7 °F (36.5 °C)   SpO2:          Exam:    VS: noted; wt 71.8 kg  Gen: Awake, staring into space, unable to respond. He is on high flow nasal cannula  Skin: no stigmata of endocarditis  Wounds: C/D/I  HEMT: AT/NC   Eyes: PERRLA, EOMI, conjunctiva pink, sclera anicteric. Neck: Supple. Trachea midline. No LAD. Chest: Reduced air entry bilaterally  Heart: RRR and no MRG. Abd: soft, non-distended, no tenderness, no hepatomegaly.  Normoactive bowel sounds. Ext: no clubbing, cyanosis, or edema  Catheter Site: without erythema or tenderness  LDA:  Neuro: Confused    ? Diagnostic Studies: reviewed  ? ? I have examined this patient and available medical records on this date and have made the above observations, conclusions and recommendations.   Electronically signed by: Electronically signed by Charis Sepulveda MD on 2/9/2022 at 1:28 PM

## 2022-02-09 NOTE — PROGRESS NOTES
Patient very agitated and confused tonight. Pulling at wires, o2 off and swatting at staff members. Attempted to reorient and redirect patient. Wife at bedside stating patient is acting very different and mumbling words. Will monitor patient.

## 2022-02-09 NOTE — CONSULTS
Pharmacy to dose TPN per Dr. Constance Jean-Baptiste   TPN Day # 2    Indication: Poor oral intake    Goal: AA 4.25% + DEX 10% @84 ml/hr  (per dietician)    Central line: none (peripheral line only)    Diet: Dysphagia, pureed, moderately thick     Maintenance Fluids: NS @ 75 ml/hr       GLUCOSE LEVELS LAST 72 HOURS                  (last 7 readings)    Recent Labs     02/07/22  1657 02/07/22  1749 02/07/22  2057 02/08/22  0556 02/08/22  1134 02/08/22  1626 02/08/22  2155 02/09/22  0313 02/09/22  0744 02/09/22  0748   POCGLU  --    < > 100* 244* 220* 240* 238* 274* 182*  --    GLUCOSE 166*  --   --   --   --   --   --   --   --  178*    < > = values in this interval not displayed. Glucose:   Goal <180    4 readings above goal  In past 24 hours   Previously on Lantus 10 units nightly per Dr. Abner Bhagat (now on hold)   Low dose sliding scale insulin coverage (per Dr. Abner Bhagat)   50 units of insulin added to the TPN formula per Dr. Rosita Campbell:  No results found for: TRIG    Triglycerides:   Goal < 400    TG level ordered for this AM, results still pending   Patient is not currently receiving propofol   Continue standard lipid replacement at this time       LIVER FUNCTION LAB EVALUATION  Recent Labs     02/07/22 1657   AST 32   ALT 34   ALKPHOS 95   BILITOT 0.5     Hepatic Function:   Previously WNL   Bilitotal previously <3, will add trace elements to the TPN        TPN LAB EVALUATION  Recent Labs     02/07/22 1657 02/07/22 1657 02/09/22  0748   *   < > 145   K 4.5   < > 4.3   *   < > 116*   CALCIUM 8.4   < > 8.3   MG  --   --  2.2   PHOS  --   --  2.9   GLUCOSE 166*  --  178*    < > = values in this interval not displayed. RENAL LAB EVALUATION  Estimated Creatinine Clearance: 34 mL/min (A) (based on SCr of 1.8 mg/dL (H)).   Recent Labs     02/07/22 1657 02/09/22  0748   BUN 50* 49*   CREATININE 1.7* 1.8*     Renal Function and Electrolytes:   Na - WNL   K - WNL    Ca - WNL   Mg - level ordered for this am, results still pending   Phos - level ordered for this AM, results still pending   No electrolytes in current formula   MARCO on CKD3, nephrology following. Formulation: Continue AA 4.25 + Dex 10% with no electrolytes at this time; increased infusion to goal rate of 84 ml/hr (per Dietary Recommendation). Standard lipids four times weekly. Component Order Dose Admin Dose   CLINIMIX 4.25/10 4.25 % Soln 2,000 mLs 2,000 mL   insulin regular 100 UNIT/ML Soln 50 Units 50 Units   adult multi-vitamin with vitamin k Inj 10 mLs 10 mL   trace minerals Cu-Mn-Se-Zn 868-86- MCG/ML Soln 1 mL 1 mL       Pharmacy will continue to follow and adjust as appropriate.    Thank you for the consult,    Maggi Ryan, [facility]  2/9/2022 @ 11:40 AM

## 2022-02-09 NOTE — PROGRESS NOTES
Nephrology Progress Note  2/9/2022 4:03 PM  Subjective: Interval History: Gloria Preston is a 64 y.o. male weak and taking po from wife  Data:   Scheduled Meds:   potassium bicarb-citric acid  10 mEq Oral BID    cefepime  2,000 mg IntraVENous Q12H    vancomycin  1,250 mg IntraVENous Once    insulin lispro  0-6 Units SubCUTAneous TID WC    metoprolol tartrate  50 mg Oral BID    dexamethasone  6 mg IntraVENous Q12H    fat emulsion  250 mL IntraVENous Once per day on Mon Tue Thu Fri    insulin lispro  0-6 Units SubCUTAneous 2 times per day    [Held by provider] insulin glargine  10 Units SubCUTAneous Nightly    albuterol sulfate HFA  2 puff Inhalation 4x daily    ipratropium  2 puff Inhalation 4x daily    [Held by provider] oxyCODONE  2.5 mg Oral TID    [Held by provider] glipiZIDE  2.5 mg Oral QAM AC    enoxaparin  30 mg SubCUTAneous Daily    pantoprazole  40 mg IntraVENous BID    [Held by provider] gabapentin  300 mg Oral TID    [Held by provider] carBAMazepine  100 mg Oral BID    baricitinib  2 mg Oral Daily    sodium chloride flush  5-40 mL IntraVENous 2 times per day    Vitamin D  2,000 Units Oral Daily    aspirin  81 mg Oral Daily    famotidine  10 mg Oral Q48H     Continuous Infusions:   PN-Adult Premix 4.25/10 - Peripheral Line      sodium chloride 75 mL/hr at 02/09/22 1241    dextrose           CBC   Recent Labs     02/07/22  1657   WBC 9.6   HGB 12.4*   HCT 36.5*   *      BMP   Recent Labs     02/07/22  1657 02/09/22  0748   * 145   K 4.5 4.3   * 116*   CO2 14* 16*   PHOS  --  2.9   BUN 50* 49*   CREATININE 1.7* 1.8*     Hepatic:   Recent Labs     02/07/22  1657   AST 32   ALT 34   BILITOT 0.5   ALKPHOS 95     Troponin: No results for input(s): TROPONINI in the last 72 hours. BNP: No results for input(s): BNP in the last 72 hours. Lipids: No results for input(s): CHOL, HDL in the last 72 hours.     Invalid input(s): LDLCALCU  ABGs:   Lab Results   Component Value Date    PO2ART 121 02/09/2022    KZD5HZQ 25.0 02/09/2022     INR:   No results for input(s): INR in the last 72 hours. Renal Labs  Albumin:    Lab Results   Component Value Date    LABALBU 2.7 02/07/2022    LABALBU 100 05/30/2012     Calcium:    Lab Results   Component Value Date    CALCIUM 8.3 02/09/2022     Phosphorus:    Lab Results   Component Value Date    PHOS 2.9 02/09/2022     U/A:    Lab Results   Component Value Date    NITRU NEGATIVE 02/04/2022    COLORU YELLOW 02/04/2022    WBCUA 2 02/04/2022    RBCUA <1 02/04/2022    MUCUS RARE 01/31/2022    BACTERIA RARE 02/04/2022    CLARITYU CLEAR 02/04/2022    SPECGRAV 1.020 02/04/2022    UROBILINOGEN 0.2 02/04/2022    BILIRUBINUR NEGATIVE 02/04/2022    BLOODU SMALL 02/04/2022    KETUA NEGATIVE 02/04/2022     ABG:    Lab Results   Component Value Date    ODO9ABL 25.0 02/09/2022    PO2ART 121 02/09/2022    YLC1GRP 15.1 02/09/2022     HgBA1c:    Lab Results   Component Value Date    LABA1C 6.0 01/29/2022     Microalbumen/Creatinine ratio:  No components found for: RUCREAT  TSH:  No results found for: TSH  IRON:  No results found for: IRON  Iron Saturation:  No components found for: PERCENTFE  TIBC:  No results found for: TIBC  FERRITIN:    Lab Results   Component Value Date    FERRITIN 2,498 01/28/2022     RPR:  No results found for: RPR  JEREMIE:  No results found for: ANATITER, JEREMIE  24 Hour Urine for Creatinine Clearance:  No components found for: CREAT4, UHRS10, UTV10      Objective:   I/O: No intake/output data recorded. No intake/output data recorded. No intake/output data recorded. Vitals: BP (!) 104/91   Pulse 80   Temp 97.7 °F (36.5 °C) (Axillary)   Resp (!) 34   Ht 6' (1.829 m)   Wt 158 lb 4.6 oz (71.8 kg)   SpO2 100%   BMI 21.47 kg/m²  {  General appearance: awake weak  HEENT: Head: Normal, normocephalic, atraumatic.   Neck: supple, symmetrical, trachea midline  Lungs: diminished breath sounds bilaterally  Heart: S1, S2 normal  Abdomen: abnormal findings:  soft nt  Extremities: edema trace  Neurologic: Mental status: alertness: lethargic        Assessment and Plan:      IMP:  #1 COVID-19 pneumonia  #2 hypernatremia  #3 acute renal failure from ATN on CKD 3 creatinine 1.6  #4 type 2 diabetes  #5 hypertension  #6 weakness with some mild confusion    Plan     1 treating covid  2 na stable  3 renal stable close to base 1.8 and need hydrate  4 ssi  5 bp low monitr  6 being feed by family and not want peg and worry not able sustain intake and risk aspiration         Carmela Payne MD, MD

## 2022-02-09 NOTE — PROGRESS NOTES
[id number] [place] OF SPEECH/LANGUAGE PATHOLOGY  DAILY PROGRESS NOTE  Reji Ruffin  2/9/2022  [id number]  Hypernatremia [E87.0]  Hypoxia [R09.02]  Elevated serum creatinine [R79.89]  Acute kidney injury ([facility] [address] [N17.9]  Elevated lipase [R74.8]  Altered mental status, unspecified altered mental status type [R41.82]  COVID [U07.1]  No Known Allergies      Pt was seen this date for dysphagia treatment. IMPRESSION AND RECOMMENDATIONS:   Reji Ruffin was seen for dysphagia follow-up. Wife present throughout. She reports pt consumed regular diet/thin liquids at home prior to admission, and that his cognition is grossly intact at baseline. Pt seen for reassessment/possible diet advancement seated upright in bed, alert, cooperative given cues for attention. He was presented with PO trials of ice chips, thin liquids via tsp/cup, nectar thick liquids via tsp/cup, honey thick liquids via tsp/cup. Oral stage impaired, characterized by adequate labial seal, poor lingual control, suspected premature pharyngeal entry. Suspect pharyngeal dysphagia characterized by delayed swallow initiation and reduced vs age-appropriate laryngeal elevation. Immediate cough followed trials of thin liquids only. Pt became increasingly lethargic as session progressed and trials were discontinued. Recommendations and POC were discussed with pt's wife in detail. Recommend continued pureed diet, advancement to nectar thick liquids, total feed, with aspiration precautions. SLP will follow.     GOALS (current status in bold):  Short-term Goals  Timeframe for Short-term Goals: 2 weeks  Goal 1: Pt will tolerate pureed diet and honey thick liquids without clinical evidence for aspiration 100% Meeting, advanced to nectar thick liquids  Goal 2: Pt will tolerate trials for diet advancement with adequate mastication (dentures at home) and 0 s/s aspiration 100% Partially met, advance to nectar thick liquids  Goal 3: Caregivers will use aspiration precautions for all PO intake 100% Meeting, discussed recommendations and plan with wife in detail         EDUCATION: spoke with nursing about diet level downgrade     PAIN RATING (0-10 Scale): denies   Time in/Time out: SLP Individual Minutes  Time In: 0978  Time Out: [address]  Minutes: 30    Visit number: [address], 2/9/2022

## 2022-02-09 NOTE — PROGRESS NOTES
Progress Note( Dr. Rah Velazquez)    Subjective:   Admit Date: 1/28/2022  PCP: Salazar Aguilar MD    Admitted For : altered mental state with generalized weakness positive for Covid pneumonia    Consulted For: Better contr ol of blood glucose    Interval History: Feels worse   Patient appeared to be confused  Currently eating little better when fed by his wife in bed    Denies any chest pains,   Yes SOB . On  oxygen  Denies nausea or vomiting. Eating much better  No new bowel or bladder symptoms. No intake or output data in the 24 hours ending 02/08/22 2331    DATA    CBC:   Recent Labs     02/06/22  0611 02/07/22  1657   WBC 13.2* 9.6   HGB 11.8* 12.4*    124*    CMP:  Recent Labs     02/06/22  0611 02/07/22  1657    147*   K 3.8 4.5   * 118*   CO2 17* 14*   BUN 58* 50*   CREATININE 2.1* 1.7*   CALCIUM 8.2* 8.4   PROT 5.5* 5.6*   LABALBU 2.7* 2.7*   BILITOT 0.6 0.5   ALKPHOS 86 95   AST 36 32   ALT 31 34     Lipids: No results found for: CHOL, HDL, TRIG  Glucose:  Recent Labs     02/08/22  1134 02/08/22  1626 02/08/22  2155   POCGLU 220* 240* 238*     [id number]:  Lab Results   Component Value Date    LABA1C 6.0 01/29/2022     High Sensitivity TSH:   Lab Results   Component Value Date    TSHHS 0.335 02/01/2022     Free T3: No results found for: FT3  Free T4:  Lab Results   Component Value Date    T4FREE 1.28 02/01/2022       CT HEAD WO CONTRAST   Final Result   No acute intracranial abnormality. RECOMMENDATIONS:   Unavailable         XR CHEST PORTABLE   Final Result   Bilateral hazy parenchymal opacities greater on the left compatible with   pneumonia. Slightly diminished inspiratory effort. XR CHEST PORTABLE   Final Result   Left basilar infiltrates concerning for pneumonia. XR CHEST PORTABLE   Final Result   Mild bibasilar airspace disease, greater on the left, most likely pneumonia.          VL DUP LOWER EXTREMITY VENOUS BILATERAL   Final Result   Left anterior tibial vein is not visualized and patient trying to get out of   bed during the exam.      No definite deep venous thrombosis is demonstrated. NM LUNG SCAN PERFUSION ONLY   Final Result   Low Probability for Pulmonary Embolus. CT HEAD WO CONTRAST   Final Result   1. No acute intracranial abnormality. 2. Diffuse parenchymal volume loss with mild-to-moderate chronic white matter   microvascular ischemic changes. 3. Chronic lacunar infarct in the right thalamus. XR CHEST PORTABLE   Final Result   Ill-defined left-greater-than-right bibasilar airspace disease most   consistent with pneumonia.               Scheduled Medicines   Medications:    insulin lispro  0-6 Units SubCUTAneous TID WC    metoprolol tartrate  50 mg Oral BID    dexamethasone  6 mg IntraVENous Q12H    fat emulsion  250 mL IntraVENous Once per day on Mon Tue Thu Fri    insulin lispro  0-6 Units SubCUTAneous 2 times per day    insulin glargine  10 Units SubCUTAneous Nightly    albuterol sulfate HFA  2 puff Inhalation 4x daily    ipratropium  2 puff Inhalation 4x daily    [Held by provider] oxyCODONE  2.5 mg Oral TID    potassium chloride  10 mEq Oral BID    [Held by provider] glipiZIDE  2.5 mg Oral QAM AC    enoxaparin  30 mg SubCUTAneous Daily    azithromycin  500 mg IntraVENous Q24H    pantoprazole  40 mg IntraVENous BID    [Held by provider] gabapentin  300 mg Oral TID    [Held by provider] carBAMazepine  100 mg Oral BID    baricitinib  2 mg Oral Daily    sodium chloride flush  5-40 mL IntraVENous 2 times per day    Vitamin D  2,000 Units Oral Daily    aspirin  81 mg Oral Daily    famotidine  10 mg Oral Q48H      Infusions:    PN-Adult Premix 4.25/10 - Peripheral Line 42 mL/hr at 02/08/22 2153    dextrose           Objective:   Vitals: BP 95/74   Pulse 88   Temp 97.4 °F (36.3 °C) (Axillary)   Resp 20   Ht 6' (1.829 m)   Wt 158 lb 4.6 oz (71.8 kg)   SpO2 93%   BMI 21.47 kg/m²   General appearance: alert and cooperative with exam feels confused  Neck: no JVD or bruit  Thyroid : Normal lobes   Lungs: Has Vesicular Breath sounds onBiPAP by  Heart:  regular rate and rhythm  Abdomen: soft, non-tender; bowel sounds normal; no masses,  no organomegaly  Musculoskeletal: Normal  Extremities: extremities normal, , no edema  Neurologic:  Awake, alert, oriented to name, place and time. Appears to be confused cranial nerves II-XII are grossly intact. Motor is  intact. Sensory is intact. ,  and gait is abnormal.  And unstable    Assessment:     Patient Active Problem List:     Acute kidney injury ([facility] [address]     Diabetes mellitus type II mild       Bilateral pneumonia possibly Covid related            Plan:     1. Reviewed POC blood glucose . Labs and X ray results   2. Reviewed Current Medicines   3. On  Correction bolus Humalog/ Basal Lantus Insulin regime on hold at this time  4. Monitor Blood glucose frequently   5. Modified  the dose of Insulin/ other medicines as needed   6. Will follow     .      Sury Bashir MD, MD

## 2022-02-09 NOTE — PLAN OF CARE

## 2022-02-09 NOTE — PROGRESS NOTES
Hospitalist Progress Note      Name:  Samm Flores /Age/Sex: 1945  (64 y.o. male)   MRN & CSN:  [id number] & [id number] Admission Date/Time: 2022  1:32 PM   Location:  [facility] [place]- PCP: Shara Dow MD       Hospital Day: 13    Assessment and Plan:     Patient is 66-year-old male past medical history of chronic pain syndrome, chronic oxycodone use, diabetes, hypertension, hyperlipidemia who was admitted for confusion, generalized weakness. Patient was found to have COVID-19 pneumonia. Patient continues to require oxygen. He is on  baricitinib, dexamethasone and antibiotics. He had unwitnessed fall  night,CT head neg. #.  Acute hypoxic respiratory failure due to viral pneumonia from COVID-19  #. Viral pneumonia from COVID-19  Patient oxygen requirement worsened to 15 L nonrebreather but now slowly improving. He is down to 11L HF NC. ABG resulted noted. Continue bronchodilators and wean of oxygen as tolerated  Continue dexamethasone,but will decrease dose from 10 to 6 mg bid. Continue baricitinib  Completed zithromax  Seen by ID  and started on vanc and cefepime given elevated CRP/procal.   Repeat CXR -Bilateral hazy parenchymal opacities greater on the left compatible with pneumonia. WBC normal.LFT's normal.  CRP up at 148, procalcitonin 0.465. Continue to trend. Pulm following . Monitor. #.  Encephalopathy, likely metabolic  Suspect metabolic encephalopathy: Due to COVID-19 pneumonia,hypoxia and meds. Discontinued restraints  Continue to reorient patient. Wife at bedside. Check ammonia level in AM.  Hold CNS depressant meds including Neurontin, Tegretol and Roxicodone. Nephrology recommended to hold Tegretol. Monitor  CT head 22 neg. #.  Acute kidney injury likely due to ATN likely due to prerenal azotemia  #. Hypernatremia   # Metabolic acidosis. Nephrology following  Sodium improved to145 today  Cr 1.8.   Bicarb 16.     #.  Sinus tachycardia/junctional tachycardia  Cardiology following  Continue metoprolol. Improved. #.  Generalized weakness and physical deconditioning  PT/OT  ? Rehab once medically stable for discharge     #. Other medical problems include hypertension, hyperlipidemia, DM with hyperglycemia  Continue current medications      # Poor oral intake  Eating more with wife feeding him. On TPN . Will d/c TPN tomorrow if still eating well. # Hyperglycemia  Endocrinology following. On low sliding scale. DVT prophylaxis: Lovenox  CODE STATUS: Total support    Subjective. :     Patient was seen and examined this morning . Wife at bedside and updated. States pt  eating better. Looks weak and chronically ill with sunken eyes. ID consulted. Objective:   Vitals:   Vitals:    02/09/22 1457   BP: (!) 104/91   Pulse: 80   Resp: (!) 34   Temp:    SpO2: 100%       Physical Exam:   GEN: Awake, alert, in no apparent distress. O2 NC   HEENT: Atraumatic, normocephalic, PERRLA, EOMI  NECK: Supple, no apparent thyromegaly or masses. RESP: Bilateral good air entry, slightly diminished right base. CARDIO/VASC:Regular rate and rhythm, normal S1, S2, no murmurs  GI: Abdomen is soft, nontender, no significant organomegaly noted, bowel sounds present  MSK: No gross joint deformities.   Skin: No significant rashes, skin lesions noted  Neuro: AOx2, cranial nerves grossly intact, no focal motor or sensory deficits   PSYCH: Affect appropriate    Medications:   Medications:    potassium bicarb-citric acid  10 mEq Oral BID    cefepime  2,000 mg IntraVENous Q12H    vancomycin  1,250 mg IntraVENous Once    vancomycin (VANCOCIN) intermittent dosing (placeholder)   Other RX Placeholder    insulin lispro  0-6 Units SubCUTAneous TID WC    metoprolol tartrate  50 mg Oral BID    dexamethasone  6 mg IntraVENous Q12H    fat emulsion  250 mL IntraVENous Once per day on Mon Tue Thu Fri    insulin lispro  0-6 Units SubCUTAneous 2 times per day    [Held by provider] insulin glargine  10 Units SubCUTAneous Nightly    albuterol sulfate HFA  2 puff Inhalation 4x daily    ipratropium  2 puff Inhalation 4x daily    [Held by provider] oxyCODONE  2.5 mg Oral TID    [Held by provider] glipiZIDE  2.5 mg Oral QAM AC    enoxaparin  30 mg SubCUTAneous Daily    pantoprazole  40 mg IntraVENous BID    [Held by provider] gabapentin  300 mg Oral TID    [Held by provider] carBAMazepine  100 mg Oral BID    baricitinib  2 mg Oral Daily    sodium chloride flush  5-40 mL IntraVENous 2 times per day    Vitamin D  2,000 Units Oral Daily    aspirin  81 mg Oral Daily    famotidine  10 mg Oral Q48H      Infusions:    PN-Adult Premix 4.25/10 - Peripheral Line      sodium chloride 75 mL/hr at 02/09/22 1241    dextrose       PRN Meds: glucose, 15 g, PRN  glucagon (rDNA), 1 mg, PRN  dextrose, 100 mL/hr, PRN  dextrose bolus (hypoglycemia), 125 mL, PRN   Or  dextrose bolus (hypoglycemia), 250 mL, PRN  sodium chloride flush, 5-40 mL, PRN  ondansetron, 4 mg, Q8H PRN   Or  ondansetron, 4 mg, Q6H PRN  polyethylene glycol, 17 g, Daily PRN  acetaminophen, 650 mg, Q6H PRN   Or  acetaminophen, 650 mg, Q6H PRN          Electronically signed by Alberto Walker MD on 2/9/2022 at 4:53 PM

## 2022-02-09 NOTE — PROGRESS NOTES
[address]  consulted by Dr. Ernestina Hinojosa for monitoring and adjustment. Indication for treatment: Pneumonia (HAP)  Goal trough: [] 10-15 mcg/mL or [x] 15-20 mcg/ml  AUC/JOVAN: [] <500 or [x] 400-600    Pertinent Laboratory Values:   Temp Readings from Last 3 Encounters:   02/09/22 97.7 °F (36.5 °C) (Axillary)   03/14/17 98.7 °F (37.1 °C) (Oral)   02/19/16 97.7 °F (36.5 °C) (Temporal)     Recent Labs     02/07/22  1657   WBC 9.6     Recent Labs     02/07/22  1657 02/09/22  0748   BUN 50* 49*   CREATININE 1.7* 1.8*     Estimated Creatinine Clearance: 34 mL/min (A) (based on SCr of 1.8 mg/dL (H)). No intake or output data in the 24 hours ending 02/09/22 1440    Pertinent Cultures:  Date    Source    Results  2/9               Blood     Ordered   2/9               MRSA                          Ordered     Vancomycin level:   TROUGH:  No results for input(s): VANCOTROUGH in the last 72 hours. RANDOM:  No results for input(s): VANCORANDOM in the last 72 hours. Assessment:  SCr, BUN, and urine output: MARCO on CKD 3, SCr trending upward @ 1.8  Day(s) of therapy: # 1  Vancomycin concentration: Pending     Plan:  MARCO on CKD 3:  intermittent dosing based on random levels  Vancomycin 1250mg IVPB X 1 dose today  Random level ordered for tomorrow AM  Pharmacy will continue to monitor patient and adjust therapy as indicated    Marii 3 2/10 @ 0600    Thank you for the consult.   Gwenetta Koyanagi, [facility]  2/9/2022 2:40 PM

## 2022-02-10 ENCOUNTER — APPOINTMENT (OUTPATIENT)
Dept: GENERAL RADIOLOGY | Age: 77
DRG: 177 | End: 2022-02-10
Payer: MEDICARE

## 2022-02-10 PROBLEM — U07.1 PNEUMONIA DUE TO COVID-19 VIRUS: Status: ACTIVE | Noted: 2022-02-10

## 2022-02-10 PROBLEM — J12.82 PNEUMONIA DUE TO COVID-19 VIRUS: Status: ACTIVE | Noted: 2022-02-10

## 2022-02-10 PROBLEM — J96.01 ACUTE RESPIRATORY FAILURE WITH HYPOXIA (HCC): Status: ACTIVE | Noted: 2022-02-10

## 2022-02-10 LAB
ANION GAP SERPL CALCULATED.3IONS-SCNC: 15 MMOL/L (ref 4–16)
BUN BLDV-MCNC: 54 MG/DL (ref 6–23)
CALCIUM SERPL-MCNC: 7.8 MG/DL (ref 8.3–10.6)
CHLORIDE BLD-SCNC: 114 MMOL/L (ref 99–110)
CO2: 9 MMOL/L (ref 21–32)
CREAT SERPL-MCNC: 1.7 MG/DL (ref 0.9–1.3)
DOSE AMOUNT: NORMAL
DOSE TIME: NORMAL
GFR AFRICAN AMERICAN: 47 ML/MIN/1.73M2
GFR NON-AFRICAN AMERICAN: 39 ML/MIN/1.73M2
GLUCOSE BLD-MCNC: 110 MG/DL (ref 70–99)
GLUCOSE BLD-MCNC: 125 MG/DL (ref 70–99)
GLUCOSE BLD-MCNC: 136 MG/DL (ref 70–99)
GLUCOSE BLD-MCNC: 157 MG/DL (ref 70–99)
GLUCOSE BLD-MCNC: 204 MG/DL (ref 70–99)
GLUCOSE BLD-MCNC: 227 MG/DL (ref 70–99)
HIGH SENSITIVE C-REACTIVE PROTEIN: 9.5 MG/L
MAGNESIUM: 1.8 MG/DL (ref 1.8–2.4)
PHOSPHORUS: 1.6 MG/DL (ref 2.5–4.9)
POTASSIUM SERPL-SCNC: 4.1 MMOL/L (ref 3.5–5.1)
SODIUM BLD-SCNC: 138 MMOL/L (ref 135–145)
VANCOMYCIN RANDOM: 10.4 UG/ML

## 2022-02-10 PROCEDURE — 6370000000 HC RX 637 (ALT 250 FOR IP): Performed by: INTERNAL MEDICINE

## 2022-02-10 PROCEDURE — 83735 ASSAY OF MAGNESIUM: CPT

## 2022-02-10 PROCEDURE — 2580000003 HC RX 258: Performed by: INTERNAL MEDICINE

## 2022-02-10 PROCEDURE — 87081 CULTURE SCREEN ONLY: CPT

## 2022-02-10 PROCEDURE — 82803 BLOOD GASES ANY COMBINATION: CPT

## 2022-02-10 PROCEDURE — 82805 BLOOD GASES W/O2 SATURATION: CPT

## 2022-02-10 PROCEDURE — 36415 COLL VENOUS BLD VENIPUNCTURE: CPT

## 2022-02-10 PROCEDURE — 6360000002 HC RX W HCPCS: Performed by: HOSPITALIST

## 2022-02-10 PROCEDURE — 6370000000 HC RX 637 (ALT 250 FOR IP): Performed by: FAMILY MEDICINE

## 2022-02-10 PROCEDURE — C9113 INJ PANTOPRAZOLE SODIUM, VIA: HCPCS | Performed by: HOSPITALIST

## 2022-02-10 PROCEDURE — 84100 ASSAY OF PHOSPHORUS: CPT

## 2022-02-10 PROCEDURE — 1200000000 HC SEMI PRIVATE

## 2022-02-10 PROCEDURE — 94761 N-INVAS EAR/PLS OXIMETRY MLT: CPT

## 2022-02-10 PROCEDURE — 6360000002 HC RX W HCPCS: Performed by: INTERNAL MEDICINE

## 2022-02-10 PROCEDURE — 76937 US GUIDE VASCULAR ACCESS: CPT

## 2022-02-10 PROCEDURE — 2500000003 HC RX 250 WO HCPCS: Performed by: INTERNAL MEDICINE

## 2022-02-10 PROCEDURE — 82010 KETONE BODYS QUAN: CPT

## 2022-02-10 PROCEDURE — 87040 BLOOD CULTURE FOR BACTERIA: CPT

## 2022-02-10 PROCEDURE — 80202 ASSAY OF VANCOMYCIN: CPT

## 2022-02-10 PROCEDURE — 2700000000 HC OXYGEN THERAPY PER DAY

## 2022-02-10 PROCEDURE — 84145 PROCALCITONIN (PCT): CPT

## 2022-02-10 PROCEDURE — 80048 BASIC METABOLIC PNL TOTAL CA: CPT

## 2022-02-10 PROCEDURE — 6370000000 HC RX 637 (ALT 250 FOR IP): Performed by: HOSPITALIST

## 2022-02-10 PROCEDURE — 94640 AIRWAY INHALATION TREATMENT: CPT

## 2022-02-10 PROCEDURE — 97530 THERAPEUTIC ACTIVITIES: CPT

## 2022-02-10 PROCEDURE — 82962 GLUCOSE BLOOD TEST: CPT

## 2022-02-10 PROCEDURE — 84134 ASSAY OF PREALBUMIN: CPT

## 2022-02-10 PROCEDURE — 2580000003 HC RX 258: Performed by: FAMILY MEDICINE

## 2022-02-10 PROCEDURE — 86141 C-REACTIVE PROTEIN HS: CPT

## 2022-02-10 PROCEDURE — 99233 SBSQ HOSP IP/OBS HIGH 50: CPT | Performed by: INTERNAL MEDICINE

## 2022-02-10 RX ORDER — LORAZEPAM 0.5 MG/1
0.5 TABLET ORAL NIGHTLY
Status: DISCONTINUED | OUTPATIENT
Start: 2022-02-10 | End: 2022-02-19 | Stop reason: HOSPADM

## 2022-02-10 RX ORDER — SODIUM BICARBONATE 650 MG/1
650 TABLET ORAL 4 TIMES DAILY
Status: DISCONTINUED | OUTPATIENT
Start: 2022-02-10 | End: 2022-02-11

## 2022-02-10 RX ORDER — SODIUM CHLORIDE 9 MG/ML
25 INJECTION, SOLUTION INTRAVENOUS PRN
Status: DISCONTINUED | OUTPATIENT
Start: 2022-02-10 | End: 2022-02-19 | Stop reason: HOSPADM

## 2022-02-10 RX ORDER — SODIUM CHLORIDE 0.9 % (FLUSH) 0.9 %
5-40 SYRINGE (ML) INJECTION EVERY 12 HOURS SCHEDULED
Status: DISCONTINUED | OUTPATIENT
Start: 2022-02-10 | End: 2022-02-19 | Stop reason: HOSPADM

## 2022-02-10 RX ORDER — SODIUM CHLORIDE 0.9 % (FLUSH) 0.9 %
5-40 SYRINGE (ML) INJECTION PRN
Status: DISCONTINUED | OUTPATIENT
Start: 2022-02-10 | End: 2022-02-19 | Stop reason: HOSPADM

## 2022-02-10 RX ORDER — OXYCODONE HYDROCHLORIDE 5 MG/1
2.5 TABLET ORAL 2 TIMES DAILY
Status: DISCONTINUED | OUTPATIENT
Start: 2022-02-10 | End: 2022-02-16

## 2022-02-10 RX ADMIN — PANTOPRAZOLE SODIUM 40 MG: 40 INJECTION, POWDER, FOR SOLUTION INTRAVENOUS at 20:35

## 2022-02-10 RX ADMIN — ALBUTEROL SULFATE 2 PUFF: 90 AEROSOL, METERED RESPIRATORY (INHALATION) at 20:17

## 2022-02-10 RX ADMIN — IPRATROPIUM BROMIDE 2 PUFF: 17 AEROSOL, METERED RESPIRATORY (INHALATION) at 20:17

## 2022-02-10 RX ADMIN — SODIUM CHLORIDE, PRESERVATIVE FREE 10 ML: 5 INJECTION INTRAVENOUS at 20:35

## 2022-02-10 RX ADMIN — Medication 2000 UNITS: at 08:39

## 2022-02-10 RX ADMIN — POTASSIUM BICARBONATE 10 MEQ: 782 TABLET, EFFERVESCENT ORAL at 20:35

## 2022-02-10 RX ADMIN — PANTOPRAZOLE SODIUM 40 MG: 40 INJECTION, POWDER, FOR SOLUTION INTRAVENOUS at 08:38

## 2022-02-10 RX ADMIN — CEFEPIME HYDROCHLORIDE 2000 MG: 2 INJECTION, POWDER, FOR SOLUTION INTRAVENOUS at 04:28

## 2022-02-10 RX ADMIN — DEXAMETHASONE SODIUM PHOSPHATE 6 MG: 4 INJECTION, SOLUTION INTRAMUSCULAR; INTRAVENOUS at 08:38

## 2022-02-10 RX ADMIN — POTASSIUM BICARBONATE 10 MEQ: 782 TABLET, EFFERVESCENT ORAL at 08:38

## 2022-02-10 RX ADMIN — SODIUM BICARBONATE: 84 INJECTION, SOLUTION INTRAVENOUS at 16:31

## 2022-02-10 RX ADMIN — OXYCODONE HYDROCHLORIDE 2.5 MG: 5 TABLET ORAL at 20:35

## 2022-02-10 RX ADMIN — LORAZEPAM 0.5 MG: 0.5 TABLET ORAL at 20:36

## 2022-02-10 RX ADMIN — SODIUM CHLORIDE, PRESERVATIVE FREE 10 ML: 5 INJECTION INTRAVENOUS at 08:53

## 2022-02-10 RX ADMIN — DEXAMETHASONE SODIUM PHOSPHATE 6 MG: 4 INJECTION, SOLUTION INTRAMUSCULAR; INTRAVENOUS at 20:35

## 2022-02-10 RX ADMIN — SODIUM BICARBONATE 650 MG: 650 TABLET ORAL at 16:42

## 2022-02-10 RX ADMIN — METOPROLOL TARTRATE 50 MG: 25 TABLET, FILM COATED ORAL at 08:39

## 2022-02-10 RX ADMIN — METOPROLOL TARTRATE 50 MG: 25 TABLET, FILM COATED ORAL at 20:35

## 2022-02-10 RX ADMIN — CEFEPIME HYDROCHLORIDE 2000 MG: 2 INJECTION, POWDER, FOR SOLUTION INTRAVENOUS at 15:14

## 2022-02-10 RX ADMIN — ASPIRIN 81 MG CHEWABLE TABLET 81 MG: 81 TABLET CHEWABLE at 08:38

## 2022-02-10 RX ADMIN — POTASSIUM PHOSPHATE, MONOBASIC AND POTASSIUM PHOSPHATE, DIBASIC 15 MMOL: 224; 236 INJECTION, SOLUTION, CONCENTRATE INTRAVENOUS at 18:42

## 2022-02-10 RX ADMIN — ALBUTEROL SULFATE 2 PUFF: 90 AEROSOL, METERED RESPIRATORY (INHALATION) at 07:42

## 2022-02-10 RX ADMIN — INSULIN LISPRO 2 UNITS: 100 INJECTION, SOLUTION INTRAVENOUS; SUBCUTANEOUS at 16:40

## 2022-02-10 RX ADMIN — INSULIN LISPRO 1 UNITS: 100 INJECTION, SOLUTION INTRAVENOUS; SUBCUTANEOUS at 11:43

## 2022-02-10 RX ADMIN — FAMOTIDINE 10 MG: 20 TABLET, FILM COATED ORAL at 08:38

## 2022-02-10 RX ADMIN — VANCOMYCIN HYDROCHLORIDE 1250 MG: 5 INJECTION, POWDER, LYOPHILIZED, FOR SOLUTION INTRAVENOUS at 12:09

## 2022-02-10 RX ADMIN — ENOXAPARIN SODIUM 30 MG: 100 INJECTION SUBCUTANEOUS at 08:39

## 2022-02-10 RX ADMIN — SODIUM BICARBONATE 650 MG: 650 TABLET ORAL at 20:36

## 2022-02-10 RX ADMIN — SODIUM BICARBONATE 100 MEQ: 84 INJECTION, SOLUTION INTRAVENOUS at 18:33

## 2022-02-10 RX ADMIN — ACETAMINOPHEN 650 MG: 325 TABLET ORAL at 08:38

## 2022-02-10 ASSESSMENT — PAIN SCALES - WONG BAKER
WONGBAKER_NUMERICALRESPONSE: 2
WONGBAKER_NUMERICALRESPONSE: 4
WONGBAKER_NUMERICALRESPONSE: 2

## 2022-02-10 ASSESSMENT — PAIN SCALES - GENERAL: PAINLEVEL_OUTOF10: 6

## 2022-02-10 NOTE — PROGRESS NOTES
Physical Therapy    Physical Therapy Treatment Note  Name: Roxane Velez MRN: [id number] :   1945   Date:  2/10/2022   Admission Date: 2022 Room:  [address] [place] [address]   Restrictions/Precautions:        droplet plus   Communication with other providers:  RN   Subjective:  Patient states:  \"No\" (once movement initiated)   Pain:   Location, Type, Intensity (0/10 to 10/10): Denies   Objective:    Observation:    Supine in bed. Wife visiting and supportive. Pt awake but tired. Limited tolerance/participation. HR 65bpm at rest increasing to 122bpm with minimal movement. 8L O2, SpO2 >90% throughout session. Treatment, including education/measures:  Supine to sit: Total assist for partially getting to EOB. Able to tolerate BLEs to EOB but heavily resisted uprighting trunk. 2 trials performed but little cooperation. Total assist x 2 for positioning in bed. Educated pt and spouse on POC, role of PT, progression with mobility. Cues given during mobility to inc safety and indep with mobility with little execution or return. Assessment / Impression:   Patient's tolerance of treatment: poor    Adverse Reaction: elevated HR  Significant change in status and impact:  Dec tolerance and participation with activity  Barriers to improvement:  Activity tolerance, strength, balance, cognition  Plan for Next Session:    Activity tolerance,strength, balance, transfers, bed mobility   Time in:  1055  Time out:  1110  Timed treatment minutes:  15  Total treatment time:  15    Previously filed items:  Social/Functional History  Additional Comments: Pt nonverbal and unable to answer questions on this date.   Short term goals  Time Frame for Short term goals: 1 week  Short term goal 1: Pt will perform bed mobility modA w elevated HOB and use of rail  Short term goal 2: Pt will perform STS to/from EOB, chair, and toilet maxA and LRAD  Short term goal 3: Pt will perform bed<>chair transfer maxA and LRAD       Electronically signed by:    [facility], [id number]  2/10/2022, 12:08 PM

## 2022-02-10 NOTE — CONSULTS
IV Consult complete. PAtient combative and confused, not appropriate for a PICC line at this time, wife at bedside and although she signed consent for the PICC she agrees that he will not allow for sterile procedure to be done in his current state. Nexiva 20g 1.75\" Extra Long PIV inserted in right FA x1 attempt with ultrasound guidance, brisk blood return, flushes easy. PAtient did attempt to hit this nurse multiple times during assessment and insertion despite wife at bedside. Protective sleeve applied to new IV site.

## 2022-02-10 NOTE — PROGRESS NOTES
Hospitalist Progress Note      Name:  Almaz Delvalle /Age/Sex: 1945  (64 y.o. male)   MRN & CSN:  [id number] & [id number] Admission Date/Time: 2022  1:32 PM   Location:  [facility]- PCP: Ciro Orozco MD       Hospital Day: 14    Assessment and Plan:     Patient is 66-year-old male past medical history of chronic pain syndrome, chronic oxycodone use, diabetes, hypertension, hyperlipidemia who was admitted for confusion, generalized weakness. Patient was found to have COVID-19 pneumonia. Patient continues to require oxygen. He is on  baricitinib, dexamethasone and antibiotics. He had unwitnessed fall  night,CT head neg. #.  Acute hypoxic respiratory failure due to viral pneumonia from COVID-19  #. Viral pneumonia from COVID-19  Patient oxygen requirement worsened to 15 L nonrebreather but now slowly improving. He is down to 5L HF NC. Continue bronchodilators and wean of oxygen as tolerated  Continue dexamethasone,6 mg bid. Continue baricitinib  Completed zithromax  Seen by ID  and started on vanc and cefepime given elevated CRP/procal suspicious for bacterial infection. CRP down from 148 to 9.5 today. Repeat CXR -Bilateral hazy parenchymal opacities greater on the left compatible with pneumonia. WBC normal.LFT's normal.  Pulm following . Monitor. #.  Encephalopathy, likely metabolic  Suspect metabolic encephalopathy: Due to COVID-19 pneumonia,hypoxia and meds. Discontinued restraints  Continue to reorient patient. Wife at bedside. Ammonia level good at 17. Held CNS depressant meds including Neurontin, Tegretol and Roxicodone. Nephrology recommended to hold Tegretol. Wife requested restarting his pain med. Will restart roxicodone,lower dose at less freq. Monitor  CT head 22 neg. #.  Acute kidney injury likely due to ATN likely due to prerenal azotemia  #. Hypernatremia   # Metabolic acidosis. Nephrology following  Sodium now normal  Cr improving.   Cr 1.8.  Bicarb 9--d/w neph,rec to give 2 amps bicarb now,then start bicarb drip-3 amps of bicarb in sterile water at 50 cc/hr. Check ABG,BHB and lactic acid. Will follow. .    Hypophosphatemia  Replace     #. Sinus tachycardia/junctional tachycardia  Cardiology following  Continue metoprolol. Improved. #.  Generalized weakness and physical deconditioning  PT/OT  ? Rehab once medically stable for discharge     #. Other medical problems include hypertension, hyperlipidemia, DM with hyperglycemia  Continue current medications      # Poor oral intake  Eating more with wife feeding him. On TPN . Will d/c TPN if eating well. # Hyperglycemia  Endocrinology following. On low sliding scale. DVT prophylaxis: Lovenox  CODE STATUS: Total support    Subjective. :     Patient was seen and examined this morning . Wife at bedside and care d/w her. Pt eating more. States restless and night and requesting medication to help him sleep. Looks weak and chronically ill with sunken eyes. Was seen by PT today. Objective:   Vitals:   Vitals:    02/10/22 1139   BP:    Pulse:    Resp:    Temp:    SpO2: 95%       Physical Exam:   GEN: Awake, alert, in no apparent distress. O2 NC   HEENT: Atraumatic, normocephalic, PERRLA, EOMI  NECK: Supple, no apparent thyromegaly or masses. RESP: Bilateral good air entry, slightly diminished right base. CARDIO/VASC:Regular rate and rhythm, normal S1, S2, no murmurs  GI: Abdomen is soft, nontender, no significant organomegaly noted, bowel sounds present  MSK: No gross joint deformities.   Skin: No significant rashes, skin lesions noted  Neuro: AOx1, cranial nerves grossly intact, no focal motor or sensory deficits   PSYCH: Affect appropriate    Medications:   Medications:    oxyCODONE  2.5 mg Oral BID    LORazepam  0.5 mg Oral Nightly    potassium bicarb-citric acid  10 mEq Oral BID    cefepime  2,000 mg IntraVENous Q12H    vancomycin (VANCOCIN) intermittent dosing (placeholder)   Other RX Placeholder    insulin lispro  0-6 Units SubCUTAneous TID     metoprolol tartrate  50 mg Oral BID    dexamethasone  6 mg IntraVENous Q12H    fat emulsion  250 mL IntraVENous Once per day on Mon Tue Thu Fri    insulin lispro  0-6 Units SubCUTAneous 2 times per day    [Held by provider] insulin glargine  10 Units SubCUTAneous Nightly    albuterol sulfate HFA  2 puff Inhalation 4x daily    ipratropium  2 puff Inhalation 4x daily    [Held by provider] glipiZIDE  2.5 mg Oral QAM AC    enoxaparin  30 mg SubCUTAneous Daily    pantoprazole  40 mg IntraVENous BID    [Held by provider] gabapentin  300 mg Oral TID    [Held by provider] carBAMazepine  100 mg Oral BID    baricitinib  2 mg Oral Daily    sodium chloride flush  5-40 mL IntraVENous 2 times per day    Vitamin D  2,000 Units Oral Daily    aspirin  81 mg Oral Daily    famotidine  10 mg Oral Q48H      Infusions:    PN-Adult Premix 4.25/10 - Peripheral Line      sodium chloride 75 mL/hr at 02/09/22 1241    dextrose       PRN Meds: glucose, 15 g, PRN  glucagon (rDNA), 1 mg, PRN  dextrose, 100 mL/hr, PRN  dextrose bolus (hypoglycemia), 125 mL, PRN   Or  dextrose bolus (hypoglycemia), 250 mL, PRN  sodium chloride flush, 5-40 mL, PRN  ondansetron, 4 mg, Q8H PRN   Or  ondansetron, 4 mg, Q6H PRN  polyethylene glycol, 17 g, Daily PRN  acetaminophen, 650 mg, Q6H PRN   Or  acetaminophen, 650 mg, Q6H PRN          Electronically signed by Javad Cyr MD on 2/10/2022 at 2:31 PM

## 2022-02-10 NOTE — PROGRESS NOTES
pulmonary      SUBJECTIVE:seen early am and no sob and on o2     OBJECTIVE    VITALS:  /68   Pulse 97   Temp 98.1 °F (36.7 °C) (Axillary)   Resp 23   Ht 6' (1.829 m)   Wt 163 lb 2.3 oz (74 kg)   SpO2 94%   BMI 22.13 kg/m²   HEAD AND FACE EXAM:  No throat injection, no active exudate,no thrush  NECK EXAM;No JVD, no masses, symmetrical  CHEST EXAM; Expansion equal and symmetrical, no masses  LUNG EXAM; Good breath sounds bilaterally. There are expiratory wheezes both lungs, there are crackles at both lung bases  CARDIOVASCULAR EXAM: Positive S1 and S2, no S3 or S4, no clicks ,no murmurs  RIGHT AND LEFT LOWER EXTRIMITY EXAM: No edema, no swelling, no inflamation            LABS   Lab Results   Component Value Date    WBC 9.6 02/07/2022    HGB 12.4 (L) 02/07/2022    HCT 36.5 (L) 02/07/2022    MCV 89.9 02/07/2022     (L) 02/07/2022     Lab Results   Component Value Date    CREATININE 1.7 (H) 02/10/2022    BUN 54 (H) 02/10/2022     02/10/2022    K 4.1 02/10/2022     (H) 02/10/2022    CO2 9 (L) 02/10/2022     Lab Results   Component Value Date    INR 5.10 (HH) 02/01/2022    PROTIME 66.9 (H) 02/01/2022          Lab Results   Component Value Date    PHOS 2.9 02/09/2022    PHOS 2.6 02/05/2022    PHOS 3.3 02/04/2022        Recent Labs     02/09/22  1115   PH 7.39   PO2ART 121*   DRO1KWR 25.0*   O2SAT 96.8         Wt Readings from Last 3 Encounters:   02/10/22 163 lb 2.3 oz (74 kg)   03/14/17 200 lb (90.7 kg)   02/19/16 200 lb 6.4 oz (90.9 kg)               ASSESMENT  Ac resp failure  covid pneumonia        PLAN  1. On decadron  2.  Cont baricitinab  3. o2 adm    2/10/2022  Estephanie Tucker MD, M.D.

## 2022-02-10 NOTE — PROGRESS NOTES
Progress Note( Dr. Tamera Mead)    Subjective:   Admit Date: 1/28/2022  PCP: Patt Mora MD    Admitted For : altered mental state with generalized weakness positive for Covid pneumonia    Consulted For: Better contr ol of blood glucose    Interval History: Feels a bit better  Patient appeared to be confused  Currently eating little better when fed by his wife in bed    Denies any chest pains,   Yes SOB . On  oxygen  Denies nausea or vomiting. Eating much better  No new bowel or bladder symptoms. No intake or output data in the 24 hours ending 02/09/22 2345    DATA    CBC:   Recent Labs     02/07/22  1657   WBC 9.6   HGB 12.4*   *    CMP:  Recent Labs     02/07/22  1657 02/09/22  0748   * 145   K 4.5 4.3   * 116*   CO2 14* 16*   BUN 50* 49*   CREATININE 1.7* 1.8*   CALCIUM 8.4 8.3   PROT 5.6*  --    LABALBU 2.7*  --    BILITOT 0.5  --    ALKPHOS 95  --    AST 32  --    ALT 34  --      Lipids:   Lab Results   Component Value Date    TRIG 190 02/09/2022     Glucose:  Recent Labs     02/09/22  1233 02/09/22  1704 02/09/22  2047   POCGLU 256* 214* 139*     [id number]:  Lab Results   Component Value Date    LABA1C 6.0 01/29/2022     High Sensitivity TSH:   Lab Results   Component Value Date    TSHHS 0.335 02/01/2022     Free T3: No results found for: FT3  Free T4:  Lab Results   Component Value Date    T4FREE 1.28 02/01/2022       CT HEAD WO CONTRAST   Final Result   No acute intracranial abnormality. RECOMMENDATIONS:   Unavailable         XR CHEST PORTABLE   Final Result   Bilateral hazy parenchymal opacities greater on the left compatible with   pneumonia. Slightly diminished inspiratory effort. XR CHEST PORTABLE   Final Result   Left basilar infiltrates concerning for pneumonia. XR CHEST PORTABLE   Final Result   Mild bibasilar airspace disease, greater on the left, most likely pneumonia.          VL DUP LOWER EXTREMITY VENOUS BILATERAL   Final Result   Left anterior tibial vein is not visualized and patient trying to get out of   bed during the exam.      No definite deep venous thrombosis is demonstrated. NM LUNG SCAN PERFUSION ONLY   Final Result   Low Probability for Pulmonary Embolus. CT HEAD WO CONTRAST   Final Result   1. No acute intracranial abnormality. 2. Diffuse parenchymal volume loss with mild-to-moderate chronic white matter   microvascular ischemic changes. 3. Chronic lacunar infarct in the right thalamus. XR CHEST PORTABLE   Final Result   Ill-defined left-greater-than-right bibasilar airspace disease most   consistent with pneumonia.          XR CHEST PORTABLE    (Results Pending)        Scheduled Medicines   Medications:    potassium bicarb-citric acid  10 mEq Oral BID    cefepime  2,000 mg IntraVENous Q12H    vancomycin (VANCOCIN) intermittent dosing (placeholder)   Other RX Placeholder    insulin lispro  0-6 Units SubCUTAneous TID WC    metoprolol tartrate  50 mg Oral BID    dexamethasone  6 mg IntraVENous Q12H    fat emulsion  250 mL IntraVENous Once per day on Mon Tue Thu Fri    insulin lispro  0-6 Units SubCUTAneous 2 times per day    [Held by provider] insulin glargine  10 Units SubCUTAneous Nightly    albuterol sulfate HFA  2 puff Inhalation 4x daily    ipratropium  2 puff Inhalation 4x daily    [Held by provider] oxyCODONE  2.5 mg Oral TID    [Held by provider] glipiZIDE  2.5 mg Oral QAM AC    enoxaparin  30 mg SubCUTAneous Daily    pantoprazole  40 mg IntraVENous BID    [Held by provider] gabapentin  300 mg Oral TID    [Held by provider] carBAMazepine  100 mg Oral BID    baricitinib  2 mg Oral Daily    sodium chloride flush  5-40 mL IntraVENous 2 times per day    Vitamin D  2,000 Units Oral Daily    aspirin  81 mg Oral Daily    famotidine  10 mg Oral Q48H      Infusions:    PN-Adult Premix 4.25/10 - Peripheral Line 84 mL/hr at 02/09/22 1713    sodium chloride 75 mL/hr at 02/09/22 1241    dextrose           Objective:   Vitals: /78   Pulse 95   Temp 97 °F (36.1 °C) (Oral)   Resp 27   Ht 6' (1.829 m)   Wt 158 lb 4.6 oz (71.8 kg)   SpO2 97%   BMI 21.47 kg/m²   General appearance: alert and cooperative with exam feels confused  Neck: no JVD or bruit  Thyroid : Normal lobes   Lungs: Has Vesicular Breath sounds onBiPAP by  Heart:  regular rate and rhythm  Abdomen: soft, non-tender; bowel sounds normal; no masses,  no organomegaly  Musculoskeletal: Normal  Extremities: extremities normal, , no edema  Neurologic:  Awake, alert, oriented to name, place and time. Appears to be confused cranial nerves II-XII are grossly intact. Motor is  intact. Sensory is intact. ,  and gait is abnormal.  And unstable    Assessment:     Patient Active Problem List:     Acute kidney injury ([facility], [place] [address]     Diabetes mellitus type II mild       Bilateral pneumonia possibly Covid related            Plan:     1. Reviewed POC blood glucose . Labs and X ray results   2. Reviewed Current Medicines   3. On TPN now  4. On  Correction bolus Humalog/ Basal Lantus Insulin regime on hold at this time  5. Monitor Blood glucose frequently   6. Modified  the dose of Insulin/ other medicines as needed   7. Will follow     .      Kevin Bowers MD, MD

## 2022-02-10 NOTE — PROGRESS NOTES
Pt agitated and unwilling to let staff touch patient or to take his meds. Pt agitated and attempted to hit staff while trying to check IV's and put O2 back on pt. Wife at bedside and states that he seems to get worse at night. Pt refused to take oral meds or drink this evening even with wife's help. [facility] NP made aware.

## 2022-02-10 NOTE — PROGRESS NOTES
Nephrology Progress Note  2/10/2022 4:15 PM  Subjective:      Interval History: Miranda Caballero is a 64 y.o. male appears fatigued and weak in bed, and concerned today of some worsening acidosis      Data:   Scheduled Meds:   oxyCODONE  2.5 mg Oral BID    LORazepam  0.5 mg Oral Nightly    sodium bicarbonate  100 mEq IntraVENous Once    lidocaine  5 mL IntraDERmal Once    sodium chloride flush  5-40 mL IntraVENous 2 times per day    potassium phosphate IVPB  15 mmol IntraVENous Once    sodium bicarbonate  650 mg Oral 4x Daily    potassium bicarb-citric acid  10 mEq Oral BID    cefepime  2,000 mg IntraVENous Q12H    vancomycin (VANCOCIN) intermittent dosing (placeholder)   Other RX Placeholder    insulin lispro  0-6 Units SubCUTAneous TID WC    metoprolol tartrate  50 mg Oral BID    dexamethasone  6 mg IntraVENous Q12H    insulin lispro  0-6 Units SubCUTAneous 2 times per day    [Held by provider] insulin glargine  10 Units SubCUTAneous Nightly    albuterol sulfate HFA  2 puff Inhalation 4x daily    ipratropium  2 puff Inhalation 4x daily    [Held by provider] glipiZIDE  2.5 mg Oral QAM AC    enoxaparin  30 mg SubCUTAneous Daily    pantoprazole  40 mg IntraVENous BID    [Held by provider] gabapentin  300 mg Oral TID    [Held by provider] carBAMazepine  100 mg Oral BID    baricitinib  2 mg Oral Daily    sodium chloride flush  5-40 mL IntraVENous 2 times per day    Vitamin D  2,000 Units Oral Daily    aspirin  81 mg Oral Daily    famotidine  10 mg Oral Q48H     Continuous Infusions:   PN-Adult Premix 4.25/10 - Peripheral Line      sodium bicarbonate infusion      sodium chloride      dextrose           CBC   Recent Labs     02/07/22  1657   WBC 9.6   HGB 12.4*   HCT 36.5*   *      BMP   Recent Labs     02/07/22  1657 02/09/22  0748 02/10/22  0827 02/10/22  1213   * 145 138  --    K 4.5 4.3 4.1  --    * 116* 114*  --    CO2 14* 16* 9*  --    PHOS  --  2.9  --  1.6*   BUN 50* 49* 54*  --    CREATININE 1.7* 1.8* 1.7*  --      Hepatic:   Recent Labs     02/07/22  1657   AST 32   ALT 34   BILITOT 0.5   ALKPHOS 95     Troponin: No results for input(s): TROPONINI in the last 72 hours. BNP: No results for input(s): BNP in the last 72 hours. Lipids: No results for input(s): CHOL, HDL in the last 72 hours. Invalid input(s): LDLCALCU  ABGs:   Lab Results   Component Value Date    PO2ART 121 02/09/2022    BDL3FXT 25.0 02/09/2022     INR:   No results for input(s): INR in the last 72 hours. Renal Labs  Albumin:    Lab Results   Component Value Date    LABALBU 2.7 02/07/2022    LABALBU 100 05/30/2012     Calcium:    Lab Results   Component Value Date    CALCIUM 7.8 02/10/2022     Phosphorus:    Lab Results   Component Value Date    PHOS 1.6 02/10/2022     U/A:    Lab Results   Component Value Date    NITRU NEGATIVE 02/04/2022    COLORU YELLOW 02/04/2022    WBCUA 2 02/04/2022    RBCUA <1 02/04/2022    MUCUS RARE 01/31/2022    BACTERIA RARE 02/04/2022    CLARITYU CLEAR 02/04/2022    SPECGRAV 1.020 02/04/2022    UROBILINOGEN 0.2 02/04/2022    BILIRUBINUR NEGATIVE 02/04/2022    BLOODU SMALL 02/04/2022    KETUA NEGATIVE 02/04/2022     ABG:    Lab Results   Component Value Date    JFS2LNR 25.0 02/09/2022    PO2ART 121 02/09/2022    RFL9SAL 15.1 02/09/2022     HgBA1c:    Lab Results   Component Value Date    LABA1C 6.0 01/29/2022     Microalbumen/Creatinine ratio:  No components found for: RUCREAT  TSH:  No results found for: TSH  IRON:  No results found for: IRON  Iron Saturation:  No components found for: PERCENTFE  TIBC:  No results found for: TIBC  FERRITIN:    Lab Results   Component Value Date    FERRITIN 2,498 01/28/2022     RPR:  No results found for: RPR  JEREMIE:  No results found for: ANATITER, JEREMIE  24 Hour Urine for Creatinine Clearance:  No components found for: CREAT4, UHRS10, UTV10      Objective:   I/O: No intake/output data recorded. No intake/output data recorded.   No intake/output data recorded. Vitals: BP (!) 118/57   Pulse 76   Temp 97.4 °F (36.3 °C) (Oral)   Resp 26   Ht 6' (1.829 m)   Wt 163 lb 2.3 oz (74 kg)   SpO2 99%   BMI 22.13 kg/m²  {  General appearance: Weak lethargic  HEENT: Head: Normal, normocephalic, atraumatic. Neck: supple, symmetrical, trachea midline  Lungs: diminished breath sounds bilaterally  Heart: S1, S2 normal  Abdomen: abnormal findings:  soft nt  Extremities: edema trace  Neurologic: Mental status: alertness: lethargic        Assessment and Plan:      IMP:  #1 COVID-19 pneumonia  #2 hypernatremia  #3 acute renal failure from ATN on CKD 3 creatinine 1.6  #4 type 2 diabetes  #5 hypertension  #6 weakness with some mild confusion  #7 metabolic acidosis    Plan     #1 treated for COVID  #2 sodium holding stable  #3 creatinine holding at 1.7 close to baseline  #4 monitor glucose control  #5 blood pressure overall stable  #6 monitor affect  #7 start sodium bicarb replete phosphorus I feel most likely patient has starvation ketoacidosis check a prealbumin level, wife does not want a feeding tube and when takes p.o. intake is not taking as much as I would think.   Can check a urine culture also make sure not missing an infection but again my feeling is it is more related poor intake         Laverne Uriarte MD, MD

## 2022-02-10 NOTE — PROGRESS NOTES
[address] [facility]  consulted by Dr. Jc Cohen for monitoring and adjustment. Indication for treatment: Pneumonia (HAP)  Goal trough: [] 10-15 mcg/mL or [x] 15-20 mcg/ml  AUC/JOVAN: [] <500 or [x] 400-600    Pertinent Laboratory Values:   Temp Readings from Last 3 Encounters:   02/10/22 98.1 °F (36.7 °C) (Axillary)   03/14/17 98.7 °F (37.1 °C) (Oral)   02/19/16 97.7 °F (36.5 °C) (Temporal)     Recent Labs     02/07/22  1657   WBC 9.6     Recent Labs     02/07/22  1657 02/09/22  0748 02/10/22  0827   BUN 50* 49* 54*   CREATININE 1.7* 1.8* 1.7*     Estimated Creatinine Clearance: 37 mL/min (A) (based on SCr of 1.7 mg/dL (H)). No intake or output data in the 24 hours ending 02/10/22 1036    Pertinent Cultures:  Date    Source    Results  2/9               Blood     Ordered   2/9               MRSA                          Ordered     Vancomycin level:   TROUGH:  No results for input(s): VANCOTROUGH in the last 72 hours. RANDOM:    Recent Labs     02/10/22  0827   VANCORANDOM 10.4       Assessment:  · SCr, BUN, and urine output: MARCO on CKD 3, SCr slightly improved @ 1.7  · Day(s) of therapy: # 2  · Vancomycin concentration:   · 2/10:  10.4, Ok to re-dose    Plan:  · MARCO on CKD 3:  intermittent dosing based on random levels  · Ordered Vancomycin 1250mg IVPB X 1 dose today  · Repeat random level tomorrow AM  · Pharmacy will continue to monitor patient and adjust therapy as indicated    Marii 3 2/11 @ 0600    Thank you for the consult.   Yamileth Cassidy, [facility]  2/10/2022 10:36 AM

## 2022-02-10 NOTE — CARE COORDINATION
Alana contreras is following. Pt is on 11L of O2 and is on TPN. SNF can not take pt on TPN nor on this high of O2. Doctor did inform this LSW that the TPN is temporary and pt will not be discharge on this. CM following.

## 2022-02-10 NOTE — PLAN OF CARE
Problem: Airway Clearance - Ineffective  Goal: Achieve or maintain patent airway  2/10/2022 1424 by Maxwell Wu  Outcome: Ongoing  2/10/2022 1424 by Maxwell Wu  Outcome: Ongoing     Problem: Gas Exchange - Impaired  Goal: Absence of hypoxia  2/10/2022 1424 by Maxwell Wu  Outcome: Ongoing  2/10/2022 1424 by Maxwell Wu  Outcome: Ongoing  Goal: Promote optimal lung function  2/10/2022 1424 by Maxwell Wu  Outcome: Ongoing  2/10/2022 1424 by Maxwell Wu  Outcome: Ongoing     Problem: Breathing Pattern - Ineffective  Goal: Ability to achieve and maintain a regular respiratory rate  2/10/2022 1424 by Maxwell Wu  Outcome: Ongoing  2/10/2022 1424 by Maxwell Wu  Outcome: Ongoing     Problem:  Body Temperature -  Risk of, Imbalanced  Goal: Ability to maintain a body temperature within defined limits  2/10/2022 1424 by Maxwell Wu  Outcome: Ongoing  2/10/2022 1424 by Maxwell Wu  Outcome: Ongoing  Goal: Will regain or maintain usual level of consciousness  2/10/2022 1424 by Maxwell Wu  Outcome: Ongoing  2/10/2022 1424 by Maxwell Wu  Outcome: Ongoing  Goal: Complications related to the disease process, condition or treatment will be avoided or minimized  2/10/2022 1424 by Maxwell Wu  Outcome: Ongoing  2/10/2022 1424 by Maxwell Wu  Outcome: Ongoing     Problem: Isolation Precautions - Risk of Spread of Infection  Goal: Prevent transmission of infection  2/10/2022 1424 by Maxwell Wu  Outcome: Ongoing  2/10/2022 1424 by Maxwell Wu  Outcome: Ongoing     Problem: Nutrition Deficits  Goal: Optimize nutritional status  2/10/2022 1424 by Maxwell Wu  Outcome: Ongoing  2/10/2022 1424 by Maxwell Wu  Outcome: Ongoing     Problem: Risk for Fluid Volume Deficit  Goal: Maintain normal heart rhythm  2/10/2022 1424 by Nicholas Etienne  Outcome: Ongoing  2/10/2022 1424 by Nicholas Etienne  Outcome: Ongoing  Goal: Maintain absence of muscle cramping  2/10/2022 1424 by Nicholas Etienne  Outcome: Ongoing  2/10/2022 1424 by Nicholas Etienne  Outcome: Ongoing  Goal: Maintain normal serum potassium, sodium, calcium, phosphorus, and pH  2/10/2022 1424 by Nicholas Etienne  Outcome: Ongoing  2/10/2022 1424 by Nicholas Etienne  Outcome: Ongoing     Problem: Loneliness or Risk for Loneliness  Goal: Demonstrate positive use of time alone when socialization is not possible  2/10/2022 1424 by Nicholas Etienne  Outcome: Ongoing  2/10/2022 1424 by Nicholas Etienne  Outcome: Ongoing     Problem: Fatigue  Goal: Verbalize increase energy and improved vitality  2/10/2022 1424 by Nicholas Etienne  Outcome: Ongoing  2/10/2022 1424 by Nicholas Etienne  Outcome: Ongoing     Problem: Patient Education: Go to Patient Education Activity  Goal: Patient/Family Education  2/10/2022 1424 by Nicholas Etienne  Outcome: Ongoing  2/10/2022 1424 by Nicholas Etienne  Outcome: Ongoing     Problem: Falls - Risk of:  Goal: Will remain free from falls  Description: Will remain free from falls  2/10/2022 1424 by Nicholas Etienne  Outcome: Ongoing  2/10/2022 1424 by Nicholas Etienne  Outcome: Ongoing  Goal: Absence of physical injury  Description: Absence of physical injury  2/10/2022 1424 by Nicholas Etienne  Outcome: Ongoing  2/10/2022 1424 by Nicholas Etienne  Outcome: Ongoing     Problem: Skin Integrity:  Goal: Will show no infection signs and symptoms  Description: Will show no infection signs and symptoms  2/10/2022 1424 by Nicholas Etienne  Outcome: Ongoing  2/10/2022 1424 by Nicholas Etienne  Outcome: Ongoing  Goal: Absence of new skin breakdown  Description: Absence of new skin breakdown  2/10/2022 1424 by Adriana Willis  Outcome: Ongoing  2/10/2022 1424 by Adriana Willis  Outcome: Ongoing     Problem: Pain:  Goal: Pain level will decrease  Description: Pain level will decrease  2/10/2022 1424 by Adriana Willis  Outcome: Ongoing  2/10/2022 1424 by Adriana Willis  Outcome: Ongoing  Goal: Control of acute pain  Description: Control of acute pain  2/10/2022 1424 by Adriana Willis  Outcome: Ongoing  2/10/2022 1424 by Adriana Willis  Outcome: Ongoing  Goal: Control of chronic pain  Description: Control of chronic pain  2/10/2022 1424 by Adriana Willis  Outcome: Ongoing  2/10/2022 1424 by Adriana Willis  Outcome: Ongoing     Problem: Nutrition  Goal: Optimal nutrition therapy  2/10/2022 1424 by Adriana Willis  Outcome: Ongoing  2/10/2022 1424 by Adriana Willis  Outcome: Ongoing

## 2022-02-11 LAB
ANION GAP SERPL CALCULATED.3IONS-SCNC: 15 MMOL/L (ref 4–16)
BASOPHILS ABSOLUTE: 0 K/CU MM
BASOPHILS RELATIVE PERCENT: 0.2 % (ref 0–1)
BETA-HYDROXYBUTYRATE: 1 MG/DL (ref 0–3)
BUN BLDV-MCNC: 53 MG/DL (ref 6–23)
CALCIUM SERPL-MCNC: 7.6 MG/DL (ref 8.3–10.6)
CHLORIDE BLD-SCNC: 107 MMOL/L (ref 99–110)
CO2: 18 MMOL/L (ref 21–32)
CREAT SERPL-MCNC: 1.7 MG/DL (ref 0.9–1.3)
DIFFERENTIAL TYPE: ABNORMAL
DOSE AMOUNT: NORMAL
DOSE TIME: NORMAL
EOSINOPHILS ABSOLUTE: 0.1 K/CU MM
EOSINOPHILS RELATIVE PERCENT: 0.4 % (ref 0–3)
GFR AFRICAN AMERICAN: 47 ML/MIN/1.73M2
GFR NON-AFRICAN AMERICAN: 39 ML/MIN/1.73M2
GLUCOSE BLD-MCNC: 140 MG/DL (ref 70–99)
GLUCOSE BLD-MCNC: 185 MG/DL (ref 70–99)
GLUCOSE BLD-MCNC: 215 MG/DL (ref 70–99)
GLUCOSE BLD-MCNC: 217 MG/DL (ref 70–99)
GLUCOSE BLD-MCNC: 247 MG/DL (ref 70–99)
GLUCOSE BLD-MCNC: 290 MG/DL (ref 70–99)
HCT VFR BLD CALC: 36.6 % (ref 42–52)
HEMOGLOBIN: 11.6 GM/DL (ref 13.5–18)
HIGH SENSITIVE C-REACTIVE PROTEIN: 8.2 MG/L
IMMATURE NEUTROPHIL %: 2.2 % (ref 0–0.43)
LACTATE: 2.8 MMOL/L (ref 0.4–2)
LACTIC ACID, SEPSIS: 3.4 MMOL/L (ref 0.5–1.9)
LYMPHOCYTES ABSOLUTE: 0.9 K/CU MM
LYMPHOCYTES RELATIVE PERCENT: 7.2 % (ref 24–44)
MAGNESIUM: 1.8 MG/DL (ref 1.8–2.4)
MCH RBC QN AUTO: 31 PG (ref 27–31)
MCHC RBC AUTO-ENTMCNC: 31.7 % (ref 32–36)
MCV RBC AUTO: 97.9 FL (ref 78–100)
MONOCYTES ABSOLUTE: 0.5 K/CU MM
MONOCYTES RELATIVE PERCENT: 4.3 % (ref 0–4)
NUCLEATED RBC %: 0 %
PDW BLD-RTO: 14.1 % (ref 11.7–14.9)
PHOSPHORUS: 1.8 MG/DL (ref 2.5–4.9)
PLATELET # BLD: 97 K/CU MM (ref 140–440)
PMV BLD AUTO: 10.6 FL (ref 7.5–11.1)
POTASSIUM SERPL-SCNC: 3.3 MMOL/L (ref 3.5–5.1)
PREALBUMIN: 26 MG/DL (ref 20–40)
PROCALCITONIN: 0.19
PROCALCITONIN: 0.19
RBC # BLD: 3.74 M/CU MM (ref 4.6–6.2)
SEGMENTED NEUTROPHILS ABSOLUTE COUNT: 10.3 K/CU MM
SEGMENTED NEUTROPHILS RELATIVE PERCENT: 85.7 % (ref 36–66)
SODIUM BLD-SCNC: 140 MMOL/L (ref 135–145)
TOTAL IMMATURE NEUTOROPHIL: 0.26 K/CU MM
TOTAL NUCLEATED RBC: 0 K/CU MM
VANCOMYCIN RANDOM: 13.4 UG/ML
WBC # BLD: 12.1 K/CU MM (ref 4–10.5)

## 2022-02-11 PROCEDURE — 6370000000 HC RX 637 (ALT 250 FOR IP): Performed by: INTERNAL MEDICINE

## 2022-02-11 PROCEDURE — 2500000003 HC RX 250 WO HCPCS: Performed by: INTERNAL MEDICINE

## 2022-02-11 PROCEDURE — 6360000002 HC RX W HCPCS: Performed by: INTERNAL MEDICINE

## 2022-02-11 PROCEDURE — C9113 INJ PANTOPRAZOLE SODIUM, VIA: HCPCS | Performed by: HOSPITALIST

## 2022-02-11 PROCEDURE — 1200000000 HC SEMI PRIVATE

## 2022-02-11 PROCEDURE — 84134 ASSAY OF PREALBUMIN: CPT

## 2022-02-11 PROCEDURE — 94761 N-INVAS EAR/PLS OXIMETRY MLT: CPT

## 2022-02-11 PROCEDURE — 6360000002 HC RX W HCPCS: Performed by: HOSPITALIST

## 2022-02-11 PROCEDURE — 6370000000 HC RX 637 (ALT 250 FOR IP): Performed by: HOSPITALIST

## 2022-02-11 PROCEDURE — 82010 KETONE BODYS QUAN: CPT

## 2022-02-11 PROCEDURE — 82962 GLUCOSE BLOOD TEST: CPT

## 2022-02-11 PROCEDURE — 84145 PROCALCITONIN (PCT): CPT

## 2022-02-11 PROCEDURE — 80202 ASSAY OF VANCOMYCIN: CPT

## 2022-02-11 PROCEDURE — 2580000003 HC RX 258: Performed by: INTERNAL MEDICINE

## 2022-02-11 PROCEDURE — 94640 AIRWAY INHALATION TREATMENT: CPT

## 2022-02-11 PROCEDURE — 2700000000 HC OXYGEN THERAPY PER DAY

## 2022-02-11 PROCEDURE — 83605 ASSAY OF LACTIC ACID: CPT

## 2022-02-11 PROCEDURE — 2580000003 HC RX 258: Performed by: FAMILY MEDICINE

## 2022-02-11 PROCEDURE — 6370000000 HC RX 637 (ALT 250 FOR IP): Performed by: FAMILY MEDICINE

## 2022-02-11 PROCEDURE — 85025 COMPLETE CBC W/AUTO DIFF WBC: CPT

## 2022-02-11 PROCEDURE — 99223 1ST HOSP IP/OBS HIGH 75: CPT | Performed by: PSYCHIATRY & NEUROLOGY

## 2022-02-11 PROCEDURE — 36415 COLL VENOUS BLD VENIPUNCTURE: CPT

## 2022-02-11 PROCEDURE — 84100 ASSAY OF PHOSPHORUS: CPT

## 2022-02-11 PROCEDURE — 80048 BASIC METABOLIC PNL TOTAL CA: CPT

## 2022-02-11 PROCEDURE — 83735 ASSAY OF MAGNESIUM: CPT

## 2022-02-11 PROCEDURE — 86141 C-REACTIVE PROTEIN HS: CPT

## 2022-02-11 RX ORDER — INSULIN GLARGINE 100 [IU]/ML
7 INJECTION, SOLUTION SUBCUTANEOUS NIGHTLY
Status: DISCONTINUED | OUTPATIENT
Start: 2022-02-11 | End: 2022-02-12

## 2022-02-11 RX ORDER — DEXAMETHASONE SODIUM PHOSPHATE 4 MG/ML
6 INJECTION, SOLUTION INTRA-ARTICULAR; INTRALESIONAL; INTRAMUSCULAR; INTRAVENOUS; SOFT TISSUE EVERY 24 HOURS
Status: COMPLETED | OUTPATIENT
Start: 2022-02-12 | End: 2022-02-14

## 2022-02-11 RX ORDER — POTASSIUM CHLORIDE 20 MEQ/1
20 TABLET, EXTENDED RELEASE ORAL ONCE
Status: DISCONTINUED | OUTPATIENT
Start: 2022-02-11 | End: 2022-02-11

## 2022-02-11 RX ORDER — INSULIN GLARGINE 100 [IU]/ML
7 INJECTION, SOLUTION SUBCUTANEOUS NIGHTLY
Status: DISCONTINUED | OUTPATIENT
Start: 2022-02-11 | End: 2022-02-11

## 2022-02-11 RX ORDER — METOPROLOL TARTRATE 50 MG/1
50 TABLET, FILM COATED ORAL 2 TIMES DAILY
Status: DISCONTINUED | OUTPATIENT
Start: 2022-02-11 | End: 2022-02-12

## 2022-02-11 RX ORDER — INSULIN GLARGINE 100 [IU]/ML
7 INJECTION, SOLUTION SUBCUTANEOUS NIGHTLY
Status: DISCONTINUED | OUTPATIENT
Start: 2022-02-12 | End: 2022-02-11

## 2022-02-11 RX ADMIN — ALBUTEROL SULFATE 2 PUFF: 90 AEROSOL, METERED RESPIRATORY (INHALATION) at 16:13

## 2022-02-11 RX ADMIN — IPRATROPIUM BROMIDE 2 PUFF: 17 AEROSOL, METERED RESPIRATORY (INHALATION) at 08:04

## 2022-02-11 RX ADMIN — OXYCODONE HYDROCHLORIDE 2.5 MG: 5 TABLET ORAL at 09:54

## 2022-02-11 RX ADMIN — VANCOMYCIN HYDROCHLORIDE 1000 MG: 1 INJECTION, POWDER, LYOPHILIZED, FOR SOLUTION INTRAVENOUS at 16:59

## 2022-02-11 RX ADMIN — POTASSIUM PHOSPHATE, MONOBASIC AND POTASSIUM PHOSPHATE, DIBASIC 20 MMOL: 224; 236 INJECTION, SOLUTION, CONCENTRATE INTRAVENOUS at 17:10

## 2022-02-11 RX ADMIN — ENOXAPARIN SODIUM 30 MG: 100 INJECTION SUBCUTANEOUS at 09:54

## 2022-02-11 RX ADMIN — BARICITINIB 2 MG: 2 TABLET, FILM COATED ORAL at 09:58

## 2022-02-11 RX ADMIN — METOPROLOL TARTRATE 50 MG: 50 TABLET, FILM COATED ORAL at 21:23

## 2022-02-11 RX ADMIN — POTASSIUM BICARBONATE 10 MEQ: 782 TABLET, EFFERVESCENT ORAL at 21:23

## 2022-02-11 RX ADMIN — CEFEPIME HYDROCHLORIDE 2000 MG: 2 INJECTION, POWDER, FOR SOLUTION INTRAVENOUS at 02:54

## 2022-02-11 RX ADMIN — ALBUTEROL SULFATE 2 PUFF: 90 AEROSOL, METERED RESPIRATORY (INHALATION) at 08:04

## 2022-02-11 RX ADMIN — SODIUM CHLORIDE, PRESERVATIVE FREE 10 ML: 5 INJECTION INTRAVENOUS at 09:58

## 2022-02-11 RX ADMIN — INSULIN GLARGINE 7 UNITS: 100 INJECTION, SOLUTION SUBCUTANEOUS at 22:39

## 2022-02-11 RX ADMIN — POTASSIUM BICARBONATE 10 MEQ: 782 TABLET, EFFERVESCENT ORAL at 09:54

## 2022-02-11 RX ADMIN — CEFEPIME HYDROCHLORIDE 2000 MG: 2 INJECTION, POWDER, FOR SOLUTION INTRAVENOUS at 14:01

## 2022-02-11 RX ADMIN — DEXAMETHASONE SODIUM PHOSPHATE 6 MG: 4 INJECTION, SOLUTION INTRAMUSCULAR; INTRAVENOUS at 09:53

## 2022-02-11 RX ADMIN — SODIUM CHLORIDE, PRESERVATIVE FREE 10 ML: 5 INJECTION INTRAVENOUS at 09:55

## 2022-02-11 RX ADMIN — SODIUM BICARBONATE: 84 INJECTION, SOLUTION INTRAVENOUS at 15:07

## 2022-02-11 RX ADMIN — PANTOPRAZOLE SODIUM 40 MG: 40 INJECTION, POWDER, FOR SOLUTION INTRAVENOUS at 09:53

## 2022-02-11 RX ADMIN — SODIUM BICARBONATE 650 MG: 650 TABLET ORAL at 09:54

## 2022-02-11 RX ADMIN — Medication 2000 UNITS: at 09:53

## 2022-02-11 RX ADMIN — SODIUM BICARBONATE 650 MG: 650 TABLET ORAL at 14:00

## 2022-02-11 RX ADMIN — OXYCODONE HYDROCHLORIDE 2.5 MG: 5 TABLET ORAL at 21:23

## 2022-02-11 RX ADMIN — ASPIRIN 81 MG CHEWABLE TABLET 81 MG: 81 TABLET CHEWABLE at 09:54

## 2022-02-11 RX ADMIN — INSULIN LISPRO 2 UNITS: 100 INJECTION, SOLUTION INTRAVENOUS; SUBCUTANEOUS at 12:03

## 2022-02-11 RX ADMIN — IPRATROPIUM BROMIDE 2 PUFF: 17 AEROSOL, METERED RESPIRATORY (INHALATION) at 11:35

## 2022-02-11 RX ADMIN — INSULIN LISPRO 3 UNITS: 100 INJECTION, SOLUTION INTRAVENOUS; SUBCUTANEOUS at 16:04

## 2022-02-11 RX ADMIN — LORAZEPAM 0.5 MG: 0.5 TABLET ORAL at 21:23

## 2022-02-11 RX ADMIN — ALBUTEROL SULFATE 2 PUFF: 90 AEROSOL, METERED RESPIRATORY (INHALATION) at 11:35

## 2022-02-11 RX ADMIN — PANTOPRAZOLE SODIUM 40 MG: 40 INJECTION, POWDER, FOR SOLUTION INTRAVENOUS at 21:23

## 2022-02-11 ASSESSMENT — PAIN SCALES - GENERAL
PAINLEVEL_OUTOF10: 0
PAINLEVEL_OUTOF10: 10

## 2022-02-11 ASSESSMENT — PAIN SCALES - WONG BAKER: WONGBAKER_NUMERICALRESPONSE: 0

## 2022-02-11 NOTE — PROGRESS NOTES
Progress Note( Dr. Lita Chapman)    Subjective:   Admit Date: 1/28/2022  PCP: Reinaldo Rojas MD    Admitted For : altered mental state with generalized weakness positive for Covid pneumonia    Consulted For: Better contr ol of blood glucose    Interval History:r patient mental condition got a little worse  Patient appeared to be confused  Currently eating little better when fed by his wife in bed  Discontinued TPN is eating better    Patient got acidotic started on bicarbonate drip    Denies any chest pains,   Yes SOB . On  oxygen  Denies nausea or vomiting. Eating much better  No new bowel or bladder symptoms. No intake or output data in the 24 hours ending 02/10/22 2138    DATA    CBC:   No results for input(s): WBC, HGB, PLT in the last 72 hours. CMP:  Recent Labs     02/09/22  0748 02/10/22  0827    138   K 4.3 4.1   * 114*   CO2 16* 9*   BUN 49* 54*   CREATININE 1.8* 1.7*   CALCIUM 8.3 7.8*     Lipids:   Lab Results   Component Value Date    TRIG 190 02/09/2022     Glucose:  Recent Labs     02/10/22  1141 02/10/22  1638 02/10/22  2046   POCGLU 157* 227* 204*     [id number]:  Lab Results   Component Value Date    LABA1C 6.0 01/29/2022     High Sensitivity TSH:   Lab Results   Component Value Date    TSHHS 0.335 02/01/2022     Free T3: No results found for: FT3  Free T4:  Lab Results   Component Value Date    T4FREE 1.28 02/01/2022       CT HEAD WO CONTRAST   Final Result   No acute intracranial abnormality. RECOMMENDATIONS:   Unavailable         XR CHEST PORTABLE   Final Result   Bilateral hazy parenchymal opacities greater on the left compatible with   pneumonia. Slightly diminished inspiratory effort. XR CHEST PORTABLE   Final Result   Left basilar infiltrates concerning for pneumonia. XR CHEST PORTABLE   Final Result   Mild bibasilar airspace disease, greater on the left, most likely pneumonia.          VL DUP LOWER EXTREMITY VENOUS BILATERAL   Final Result   Left

## 2022-02-11 NOTE — CONSULTS
Neurology Service Consult Note  [facility]  Patient Name: Shiraz Faustin  : 1945        Subjective:   Reason for consult: \"covid encephalopathy\"  Patient seen and examined. Chart reviewed in detail. All information obtained from wife and chart review secondary to patient current mental status. 64 y.o. -male with PMH of T2DM, HLD, HTN, Kidney Failure presenting to [facility] on 22 after EMS was called via the  department for a well check. Upon EMS arrival patient was found confused and generalized weakness, at the time of presentation patient was able to report that both him and his wife have had symptoms over 2 to 3 weeks. Patient mainly presented with poor appetite, cough and diarrhea. Neurology being consulted for Covid encephalopathy. Patients wife is at bedside and reports that patient and herself both had were diagnosed with Covid, in  which she was able to get over hers virus. She further reports that  Prior to the Covid virus patient was able to walk and care for himself. Wife states that approximately four days ago he was able to say \" I love you over the phone and had some mumbling. \" However, for the past three days now patient only mumbles and has a severe dysarthric speech pattern. Mr. Humberto Anguiano  does follow simple commands and is moving all extremities spontaneously, however does not track in the room. he does blink to threats. Patient is fairly sleepy but opens his eyes to voice continue to keep them open throughout the exam. Per chart review,  On Saturday patient had a fall and they obtained  a CT of head, which was non acute. At that time I team  held all pain/sedated medications. Patient was then started on Vancomycin as he does have leukocytosis. Reported by  internal medicine team patient is slightly more alert since medications have been held.       Past Medical History:   Diagnosis Date    DM (diabetes mellitus) ([facility] [address]     Dysphagia     Dysphonia     Hyperlipidemia     Hypertension     Kidney failure     Osteoarthritis     :   Past Surgical History:   Procedure Laterality Date    CERVICAL SPINE SURGERY      3 years ago    ENDOSCOPY, COLON, DIAGNOSTIC  2/19/2016    mild inflammation at the squamocolumnar junction- biopsied, no hiatal hernia, diffuse gastric erythema- biopsies to rule out chronic gastritis or intestinal metaplasia     Medications:  Scheduled Meds:   oxyCODONE  2.5 mg Oral BID    LORazepam  0.5 mg Oral Nightly    lidocaine  5 mL IntraDERmal Once    sodium chloride flush  5-40 mL IntraVENous 2 times per day    sodium bicarbonate  650 mg Oral 4x Daily    potassium bicarb-citric acid  10 mEq Oral BID    cefepime  2,000 mg IntraVENous Q12H    vancomycin (VANCOCIN) intermittent dosing (placeholder)   Other RX Placeholder    insulin lispro  0-6 Units SubCUTAneous TID WC    metoprolol tartrate  50 mg Oral BID    dexamethasone  6 mg IntraVENous Q12H    insulin lispro  0-6 Units SubCUTAneous 2 times per day    [Held by provider] insulin glargine  10 Units SubCUTAneous Nightly    albuterol sulfate HFA  2 puff Inhalation 4x daily    ipratropium  2 puff Inhalation 4x daily    enoxaparin  30 mg SubCUTAneous Daily    pantoprazole  40 mg IntraVENous BID    [Held by provider] gabapentin  300 mg Oral TID    [Held by provider] carBAMazepine  100 mg Oral BID    baricitinib  2 mg Oral Daily    sodium chloride flush  5-40 mL IntraVENous 2 times per day    Vitamin D  2,000 Units Oral Daily    aspirin  81 mg Oral Daily    famotidine  10 mg Oral Q48H     Continuous Infusions:   sodium bicarbonate infusion 50 mL/hr at 02/10/22 1631    sodium chloride      dextrose       PRN Meds:.sodium chloride flush, sodium chloride, glucose, glucagon (rDNA), dextrose, dextrose bolus (hypoglycemia) **OR** dextrose bolus (hypoglycemia), sodium chloride flush, ondansetron **OR** ondansetron, polyethylene glycol, acetaminophen **OR** acetaminophen    No Known Allergies  Social History     Socioeconomic History    Marital status:      Spouse name: Not on file    Number of children: Not on file    Years of education: Not on file    Highest education level: Not on file   Occupational History    Not on file   Tobacco Use    Smoking status: Former Smoker     Years: 50.00     Quit date: 2010     Years since quittin.1    Smokeless tobacco: Not on file   Substance and Sexual Activity    Alcohol use: No    Drug use: No    Sexual activity: Not on file   Other Topics Concern    Not on file   Social History Narrative    Not on file     Social Determinants of Health     Financial Resource Strain:     Difficulty of Paying Living Expenses: Not on file   Food Insecurity:     Worried About [address] in the Last Year: Not on file    [address] in the Last Year: Not on file   Transportation Needs:     Lack of Transportation (Medical): Not on file    Lack of Transportation (Non-Medical):  Not on file   Physical Activity:     Days of Exercise per Week: Not on file    Minutes of Exercise per Session: Not on file   Stress:     Feeling of Stress : Not on file   Social Connections:     Frequency of Communication with Friends and Family: Not on file    Frequency of Social Gatherings with Friends and Family: Not on file    Attends Sabianist Services: Not on file    Active Member of [address] [place] [address] or Organizations: Not on file    Attends Club or Organization Meetings: Not on file    Marital Status: Not on file   Intimate Partner Violence:     Fear of Current or Ex-Partner: Not on file    Emotionally Abused: Not on file    Physically Abused: Not on file    Sexually Abused: Not on file   Housing Stability:     Unable to Pay for Housing in the Last Year: Not on file    Number of [place] in the Last Year: Not on file    Unstable Housing in the Last Year: Not on file      Family History   Problem Relation Age of Onset    Cancer Sister 61        colon cancer    Cancer Father 68        stomach cancer spread to liver         ROS (10 systems)  Unable to ask ROS questions due to patient being encephalopathic at this time. Physical Exam:       Vitals:    02/11/22 0256 02/11/22 0804 02/11/22 0954 02/11/22 1135   BP: (!) 133/58  99/60    Pulse: 80  94    Resp: 25  24    Temp: 97.6 °F (36.4 °C)  96.9 °F (36.1 °C)    TempSrc: Oral  Axillary    SpO2: 96% 90% 98% 98%   Weight: 160 lb 0.9 oz (72.6 kg)      Height:           Wt Readings from Last 3 Encounters:   02/11/22 160 lb 0.9 oz (72.6 kg)   03/14/17 200 lb (90.7 kg)   02/19/16 200 lb 6.4 oz (90.9 kg)     Temp Readings from Last 3 Encounters:   02/11/22 96.9 °F (36.1 °C) (Axillary)   03/14/17 98.7 °F (37.1 °C) (Oral)   02/19/16 97.7 °F (36.5 °C) (Temporal)     BP Readings from Last 3 Encounters:   02/11/22 99/60   03/14/17 (!) 139/91   02/19/16 160/78     Pulse Readings from Last 3 Encounters:   02/11/22 94   03/14/17 82   02/19/16 79        Gen: awake, drowsy, difficult to examine orientation secondary to encephalopathic state. NAD, mumbles, severe dysarthric speech pattern follows simple commands intermittently    HEENT: NC/AT, EOMI, does not track in room, however does blink to threat PERRL, mmm, neck supple, no meningeal signs; Heart: Regular  Lungs: Respirations Unlabored  Ext: no edema,   Psych: ADELE  Skin: no rashes or lesions    NEUROLOGIC EXAM:    Mental Status: awake, drowsy, difficult to examine orientation secondary to encephalopathic state.   NAD, mumbles, severe dysarthric speech pattern follows simple commands intermittently    Cranial Nerve Exam:   CN II-XII: PERRL, no nystagmus, no gaze paresis, face appears symmetrical; hearing intact to conversational tone,    Motor Exam:       Moves extremities spontaneously   Tone and bulk normal   No pronator drift    Deep Tendon Reflexes: 2/4 biceps, triceps, brachioradialis, patellar, and achilles b/l; flexor plantar responses b/l    Sensation: Moves extremities to touch and spontaneously     Coordination/Cerebellum:       Tremors--none      Rapidly alternating movements: ADELE                 Heel-to-Shin: ADELE      Finger-to-Nose: ADELE  Gait and stance:      Gait: deferred      LABS:        CBC: No results for input(s): WBC, RBC, HGB, HCT, PLT, MCV in the last 72 hours. BMP:    Recent Labs     02/10/22  0827      K 4.1   *   CO2 9*   BUN 54*   CREATININE 1.7*   GLUCOSE 110*   CALCIUM 7.8*       IMAGING:    CTH     Impression   No acute intracranial abnormality         Above imaging reviewed in detail. ASSESSMENT/PLAN:    64 y.o. -male with PMH of T2DM, HLD, HTN, Kidney Failure presenting to [facility] on 01/28/22 after EMS was called via the  department in which they were called for a well check. Patient chief complaint on presentation was poor appetite, cough and diarrhea. Neurology being consulted for Covid encephalopathy. 1. AMS likely due to acute metabolic encephalopathy secondary to COVID-19 infection superimposed on decreased cognitive reserve.     - PT/OT for the recommendation  - Further recommendation pending neurodiagnostics    Neurodiagnostics  -- CTH as above  -- MRI brain ordered  --creatinine 1.7  --BUN 54  --WBC trended down    Patient discussed with attending physician Dr. Symone Mondragon    Thank you for allowing us to participate in the care of your patient. If there are any questions regarding evaluation please feel free to contact us. CRISTOBAL Jeronimo - CNP, 2/11/2022      ------------------------------------    Attending Note:  I have rounded on this patient with Mario Haq NP. I have reviewed the chart and we have discussed this case in detail. The patient was seen and examined by myself. Pertinent labs and imaging have been personally reviewed. Our findings and impressions were discussed with the patient.  I concur with the Nurse Practitioner's assessment and plan.     Vasiliy Melo DO 2/11/2022 9:03 PM

## 2022-02-11 NOTE — PROGRESS NOTES
pulmonary      SUBJECTIVE: developing sev met acidosis     OBJECTIVE    VITALS:  BP (!) 133/58   Pulse 80   Temp 97.6 °F (36.4 °C) (Oral)   Resp 25   Ht 6' (1.829 m)   Wt 160 lb 0.9 oz (72.6 kg)   SpO2 90%   BMI 21.71 kg/m²   HEAD AND FACE EXAM:  No throat injection, no active exudate,no thrush  NECK EXAM;No JVD, no masses, symmetrical  CHEST EXAM; Expansion equal and symmetrical, no masses  LUNG EXAM; Good breath sounds bilaterally. There are expiratory wheezes both lungs, there are crackles at both lung bases  CARDIOVASCULAR EXAM: Positive S1 and S2, no S3 or S4, no clicks ,no murmurs  RIGHT AND LEFT LOWER EXTRIMITY EXAM: No edema, no swelling, no inflamation            LABS   Lab Results   Component Value Date    WBC 9.6 02/07/2022    HGB 12.4 (L) 02/07/2022    HCT 36.5 (L) 02/07/2022    MCV 89.9 02/07/2022     (L) 02/07/2022     Lab Results   Component Value Date    CREATININE 1.7 (H) 02/10/2022    BUN 54 (H) 02/10/2022     02/10/2022    K 4.1 02/10/2022     (H) 02/10/2022    CO2 9 (L) 02/10/2022     Lab Results   Component Value Date    INR 5.10 (HH) 02/01/2022    PROTIME 66.9 (H) 02/01/2022          Lab Results   Component Value Date    PHOS 1.6 02/10/2022    PHOS 2.9 02/09/2022    PHOS 2.6 02/05/2022        Recent Labs     02/09/22  1115   PH 7.39   PO2ART 121*   OLP4CVP 25.0*   O2SAT 96.8         Wt Readings from Last 3 Encounters:   02/11/22 160 lb 0.9 oz (72.6 kg)   03/14/17 200 lb (90.7 kg)   02/19/16 200 lb 6.4 oz (90.9 kg)               ASSESMENT  Ac resp failure  covid pneumonia  Ac on ch kidney disease with severe metabolic acidosis        PLAN  1. Bd rx  2. cpm  3. Cont o2  4.  Severe metabolic acidosis as per renal    2/11/2022  Estephanie Tucker MD, M.D.

## 2022-02-11 NOTE — PROGRESS NOTES
[address] [facility]  consulted by Dr. Ashok Mcbride for monitoring and adjustment. Indication for treatment: Pneumonia (HAP)  Goal trough: [] 10-15 mcg/mL or [x] 15-20 mcg/ml  AUC/JOVAN: [] <500 or [x] 400-600    Pertinent Laboratory Values:   Temp Readings from Last 3 Encounters:   02/11/22 96.9 °F (36.1 °C) (Axillary)   03/14/17 98.7 °F (37.1 °C) (Oral)   02/19/16 97.7 °F (36.5 °C) (Temporal)     Recent Labs     02/11/22  1116   WBC 12.1*     Recent Labs     02/09/22  0748 02/10/22  0827 02/11/22  1116   BUN 49* 54* 53*   CREATININE 1.8* 1.7* 1.7*     Estimated Creatinine Clearance: 37 mL/min (A) (based on SCr of 1.7 mg/dL (H)). No intake or output data in the 24 hours ending 02/11/22 1449    Pertinent Cultures:  Date    Source    Results  2/9               Blood     Ordered   2/9               MRSA                          Ordered     Vancomycin level:   TROUGH:  No results for input(s): VANCOTROUGH in the last 72 hours. RANDOM:    Recent Labs     02/10/22  0827 02/11/22  1116   VANCORANDOM 10.4 13.4       Assessment:  · SCr, BUN, and urine output: MARCO on CKD 3, SCr now steady @1.7, no UOP data  · Day(s) of therapy: # 3  · Vancomycin concentration:   · 2/10:  10.4, Ok to re-dose  · 2/11 - 13.4, 23 hour level post 1250mg dose    Plan:  · MARCO on CKD 3, SCR now steady around 1.7, will move to scheduled dosing. · Vanco level this am @13.4 about 23 hours post 1250mg dose. · Schedule vancomycin 100mg ivpb q24h   · Recheck the vanco level in 2 days  · Pharmacy will continue to monitor patient and adjust therapy as indicated    Marii 3 2/13 @ 10:00    Thank you for the consult. Shannen Daniel  [facility]  2/11/2022 2:49 PM

## 2022-02-11 NOTE — PROGRESS NOTES
Hospitalist Progress Note      Name:  Cornell Pederson /Age/Sex: 1945  (64 y.o. male)   MRN & CSN:  [id number] & [id number] Admission Date/Time: 2022  1:32 PM   [facility]- PCP: Guido Salazar MD       Hospital Day: 15    Assessment and Plan:     Patient is 17-year-old male past medical history of chronic pain syndrome, chronic oxycodone use, diabetes, hypertension, hyperlipidemia who was admitted for confusion, generalized weakness. Patient was found to have COVID-19 pneumonia. Patient continues to require oxygen. He is on  baricitinib, dexamethasone and antibiotics. He had unwitnessed fall  night,CT head neg. #.  Acute hypoxic respiratory failure due to viral pneumonia from COVID-19  #. Viral pneumonia from COVID-19  # Bacterial PNA with sepsis  On admission,patient's oxygen requirement worsened to 15 L but now slowly improved. He is now off oxygen. Continue to monitor. Continue bronchodilators   Continue dexamethasone,6 , decrease to daily for few more doses . Completed baricitinib  Completed zithromax  Seen by ID  and started on vanc and cefepime given elevated CRP/procal suspicious for bacterial infection. CRP down from 148 to 9.5 today. Lactic acid elevated at 3.4 . WBC elevated at 12. 1. Rpeat procalcitonin pending . Met sepsis criteria based on heart rate greater than 90, WBC greater than 12 and elevated lactic acid of 3.4. Follow blood cultures and MRSA screen  Repeat CXR -Bilateral hazy parenchymal opacities greater on the left compatible with pneumonia. LFT's normal.  Pulm following . Monitor. #.  Encephalopathy, likely metabolic  Suspect metabolic encephalopathy: Due to COVID-19 pneumonia,hypoxia and meds. Discontinued restraints  Continue to reorient patient. Wife at bedside and assisting. Ammonia level good at 17. B12 ,folate, TSH normal  Held CNS depressant meds including Neurontin, Tegretol and Roxicodone.    Nephrology recommended to hold Tegretol. Wife requested restarting his pain med. Restart roxicodone,lower dose at less freq. CT head 2/5/22 neg. Neuro consulted, await recommendations  Monitor. #.  Acute kidney injury likely due to ATN likely due to prerenal azotemia  # Metabolic acidosis. Nephrology following  Cr stable at 1.7. Bicarb improved from 9 to 18. May be partly from lactic acidosis. Beta hydroxybutyrate 1.0 so unlikely starvation acidosis  Received several amps of bicarb and currently on bicarb drip and tabs . Refused ABG  Continue to monitor    #. Hypernatremia   Resolved    Hypophosphatemia/hypokalemia  Replacing both. Recheck in the morning     #. Sinus tachycardia/junctional tachycardia  Cardiology following  Continue metoprolol 50 mg twice daily. Improved. #.  Generalized weakness and physical deconditioning  PT/OT  ? Rehab once medically stable for discharge     #. Other medical problems include hypertension, hyperlipidemia, DM with hyperglycemia  Continue current medications      # Poor oral intake  Eating more with wife feeding him. Off TPN     # Hyperglycemia  Endocrinology following. On low sliding scale. DVT prophylaxis: Lovenox  CODE STATUS: Total support    Subjective. :     Patient was seen and examined this morning . Wife at bedside and care d/w her. Pt eating more. no new issues. Still looks very weak. Still confused. Neurology consulted. Objective:   Vitals:   Vitals:    02/11/22 1135   BP:    Pulse:    Resp:    Temp:    SpO2: 98%       Physical Exam:   GEN: Awake, alert but confused, in no apparent distress. HEENT: Atraumatic, normocephalic, PERRLA, EOMI  NECK: Supple, no apparent thyromegaly or masses. RESP: Bilateral good air entry, slight right basal crackles. CARDIO/VASC:Regular rate and rhythm, normal S1, S2, no murmurs  GI: Abdomen is soft, nontender, no significant organomegaly noted, bowel sounds present  MSK: No gross joint deformities.   Skin: No significant rashes, skin lesions noted  Neuro: AOx1, cranial nerves grossly intact, no focal motor or sensory deficits   PSYCH: Affect appropriate    Medications:   Medications:    potassium phosphate IVPB  20 mmol IntraVENous Once    [START ON 2/12/2022] dexamethasone  6 mg IntraVENous Q24H    metoprolol tartrate  50 mg Oral BID    oxyCODONE  2.5 mg Oral BID    LORazepam  0.5 mg Oral Nightly    lidocaine  5 mL IntraDERmal Once    sodium chloride flush  5-40 mL IntraVENous 2 times per day    sodium bicarbonate  650 mg Oral 4x Daily    potassium bicarb-citric acid  10 mEq Oral BID    cefepime  2,000 mg IntraVENous Q12H    vancomycin (VANCOCIN) intermittent dosing (placeholder)   Other RX Placeholder    insulin lispro  0-6 Units SubCUTAneous TID WC    insulin lispro  0-6 Units SubCUTAneous 2 times per day    [Held by provider] insulin glargine  10 Units SubCUTAneous Nightly    albuterol sulfate HFA  2 puff Inhalation 4x daily    ipratropium  2 puff Inhalation 4x daily    enoxaparin  30 mg SubCUTAneous Daily    pantoprazole  40 mg IntraVENous BID    [Held by provider] gabapentin  300 mg Oral TID    [Held by provider] carBAMazepine  100 mg Oral BID    baricitinib  2 mg Oral Daily    sodium chloride flush  5-40 mL IntraVENous 2 times per day    Vitamin D  2,000 Units Oral Daily    aspirin  81 mg Oral Daily    famotidine  10 mg Oral Q48H      Infusions:    sodium bicarbonate infusion 50 mL/hr at 02/10/22 1631    sodium chloride      dextrose       PRN Meds: sodium chloride flush, 5-40 mL, PRN  sodium chloride, 25 mL, PRN  glucose, 15 g, PRN  glucagon (rDNA), 1 mg, PRN  dextrose, 100 mL/hr, PRN  dextrose bolus (hypoglycemia), 125 mL, PRN   Or  dextrose bolus (hypoglycemia), 250 mL, PRN  sodium chloride flush, 5-40 mL, PRN  ondansetron, 4 mg, Q8H PRN   Or  ondansetron, 4 mg, Q6H PRN  polyethylene glycol, 17 g, Daily PRN  acetaminophen, 650 mg, Q6H PRN   Or  acetaminophen, 650 mg, Q6H PRN          Electronically signed by Monique Galdamez MD on 2/11/2022 at 2:28 PM

## 2022-02-11 NOTE — PROGRESS NOTES
Nephrology Progress Note  2/11/2022 3:22 PM  Subjective:      Interval History: Craig Chiu is a 64 y.o. male weak poor intake wife agrees only when she is there does eat otherwise he does not eat for any one else      Data:   Scheduled Meds:   potassium phosphate IVPB  20 mmol IntraVENous Once    [START ON 2/12/2022] dexamethasone  6 mg IntraVENous Q24H    metoprolol tartrate  50 mg Oral BID    vancomycin  1,000 mg IntraVENous Q24H    oxyCODONE  2.5 mg Oral BID    LORazepam  0.5 mg Oral Nightly    lidocaine  5 mL IntraDERmal Once    sodium chloride flush  5-40 mL IntraVENous 2 times per day    sodium bicarbonate  650 mg Oral 4x Daily    potassium bicarb-citric acid  10 mEq Oral BID    cefepime  2,000 mg IntraVENous Q12H    insulin lispro  0-6 Units SubCUTAneous TID WC    insulin lispro  0-6 Units SubCUTAneous 2 times per day    [Held by provider] insulin glargine  10 Units SubCUTAneous Nightly    albuterol sulfate HFA  2 puff Inhalation 4x daily    ipratropium  2 puff Inhalation 4x daily    enoxaparin  30 mg SubCUTAneous Daily    pantoprazole  40 mg IntraVENous BID    [Held by provider] gabapentin  300 mg Oral TID    [Held by provider] carBAMazepine  100 mg Oral BID    baricitinib  2 mg Oral Daily    sodium chloride flush  5-40 mL IntraVENous 2 times per day    Vitamin D  2,000 Units Oral Daily    aspirin  81 mg Oral Daily    famotidine  10 mg Oral Q48H     Continuous Infusions:   sodium bicarbonate infusion 50 mL/hr at 02/11/22 1507    sodium chloride      dextrose           CBC   Recent Labs     02/11/22  1116   WBC 12.1*   HGB 11.6*   HCT 36.6*   PLT 97*      BMP   Recent Labs     02/09/22  0748 02/10/22  0827 02/10/22  1213 02/11/22  1116    138  --  140   K 4.3 4.1  --  3.3*   * 114*  --  107   CO2 16* 9*  --  18*   PHOS 2.9  --  1.6* 1.8*   BUN 49* 54*  --  53*   CREATININE 1.8* 1.7*  --  1.7*     Hepatic:   No results for input(s): AST, ALT, ALB, BILITOT, ALKPHOS in the last 72 hours. Troponin: No results for input(s): TROPONINI in the last 72 hours. BNP: No results for input(s): BNP in the last 72 hours. Lipids: No results for input(s): CHOL, HDL in the last 72 hours. Invalid input(s): LDLCALCU  ABGs:   Lab Results   Component Value Date    PO2ART 121 02/09/2022    CYO0LNX 25.0 02/09/2022     INR:   No results for input(s): INR in the last 72 hours. Renal Labs  Albumin:    Lab Results   Component Value Date    LABALBU 2.7 02/07/2022    LABALBU 100 05/30/2012     Calcium:    Lab Results   Component Value Date    CALCIUM 7.6 02/11/2022     Phosphorus:    Lab Results   Component Value Date    PHOS 1.8 02/11/2022     U/A:    Lab Results   Component Value Date    NITRU NEGATIVE 02/04/2022    COLORU YELLOW 02/04/2022    WBCUA 2 02/04/2022    RBCUA <1 02/04/2022    MUCUS RARE 01/31/2022    BACTERIA RARE 02/04/2022    CLARITYU CLEAR 02/04/2022    SPECGRAV 1.020 02/04/2022    UROBILINOGEN 0.2 02/04/2022    BILIRUBINUR NEGATIVE 02/04/2022    BLOODU SMALL 02/04/2022    KETUA NEGATIVE 02/04/2022     ABG:    Lab Results   Component Value Date    MQX5LMK 25.0 02/09/2022    PO2ART 121 02/09/2022    VCD0YGN 15.1 02/09/2022     HgBA1c:    Lab Results   Component Value Date    LABA1C 6.0 01/29/2022     Microalbumen/Creatinine ratio:  No components found for: RUCREAT  TSH:  No results found for: TSH  IRON:  No results found for: IRON  Iron Saturation:  No components found for: PERCENTFE  TIBC:  No results found for: TIBC  FERRITIN:    Lab Results   Component Value Date    FERRITIN 2,498 01/28/2022     RPR:  No results found for: RPR  JEREMIE:  No results found for: ANATITER, JEREMIE  24 Hour Urine for Creatinine Clearance:  No components found for: CREAT4, UHRS10, UTV10      Objective:   I/O: No intake/output data recorded. No intake/output data recorded. No intake/output data recorded.   Vitals: BP 99/60   Pulse 94   Temp 96.9 °F (36.1 °C) (Axillary)   Resp 24   Ht 6' (1.829 m)   Wt 160 lb 0.9 oz (72.6 kg)   SpO2 98%   BMI 21.71 kg/m²  {  General appearance: Weak lethargic  HEENT: Head: Normal, normocephalic, atraumatic. Neck: supple, symmetrical, trachea midline  Lungs: diminished breath sounds bilaterally  Heart: S1, S2 normal  Abdomen: abnormal findings:  soft nt  Extremities: edema trace  Neurologic: Mental status: alertness: lethargic        Assessment and Plan:      IMP:  #1 COVID-19 pneumonia  #2 hypernatremia  #3 acute renal failure from ATN on CKD 3 creatinine 1.6  #4 type 2 diabetes  #5 hypertension  #6 weakness with some mild confusion  #7 metabolic acidosis    Plan     #1 treated for COVID  #2 sodium stable now  #3 creatinine holding 1.7 close to baseline  #4 work on improving glucose  #5 blood pressure low hold meds  #6 affect really not improving still poor intake await prealbumin replete phosphorus and potassium and again I am very certain patient is just not getting left nutritional support to support level of life and the wife is now agreeable to that statement as only time he eats is when she feeds him which is not enough nutrition the acidosis does improve with bicarb but will need something more substantial to maintain.   If the plan is to be aggressive we will need a PEG tube or other forms of nutrition to help supplement otherwise this will be a prolonged course in this current setting         Don Paniagua MD, MD

## 2022-02-12 LAB
ANION GAP SERPL CALCULATED.3IONS-SCNC: 11 MMOL/L (ref 4–16)
BUN BLDV-MCNC: 40 MG/DL (ref 6–23)
CALCIUM SERPL-MCNC: 7.4 MG/DL (ref 8.3–10.6)
CHLORIDE BLD-SCNC: 105 MMOL/L (ref 99–110)
CO2: 26 MMOL/L (ref 21–32)
CREAT SERPL-MCNC: 1.6 MG/DL (ref 0.9–1.3)
CULTURE: NORMAL
GFR AFRICAN AMERICAN: 51 ML/MIN/1.73M2
GFR NON-AFRICAN AMERICAN: 42 ML/MIN/1.73M2
GLUCOSE BLD-MCNC: 100 MG/DL (ref 70–99)
GLUCOSE BLD-MCNC: 108 MG/DL (ref 70–99)
GLUCOSE BLD-MCNC: 141 MG/DL (ref 70–99)
GLUCOSE BLD-MCNC: 161 MG/DL (ref 70–99)
GLUCOSE BLD-MCNC: 245 MG/DL (ref 70–99)
GLUCOSE BLD-MCNC: 266 MG/DL (ref 70–99)
HIGH SENSITIVE C-REACTIVE PROTEIN: 16.1 MG/L
Lab: NORMAL
MAGNESIUM: 1.8 MG/DL (ref 1.8–2.4)
PHOSPHORUS: 3.1 MG/DL (ref 2.5–4.9)
POTASSIUM SERPL-SCNC: 3.9 MMOL/L (ref 3.5–5.1)
PROCALCITONIN: 0.2
SODIUM BLD-SCNC: 142 MMOL/L (ref 135–145)
SPECIMEN: NORMAL

## 2022-02-12 PROCEDURE — 84145 PROCALCITONIN (PCT): CPT

## 2022-02-12 PROCEDURE — C9113 INJ PANTOPRAZOLE SODIUM, VIA: HCPCS | Performed by: HOSPITALIST

## 2022-02-12 PROCEDURE — 2500000003 HC RX 250 WO HCPCS: Performed by: INTERNAL MEDICINE

## 2022-02-12 PROCEDURE — 94640 AIRWAY INHALATION TREATMENT: CPT

## 2022-02-12 PROCEDURE — 6370000000 HC RX 637 (ALT 250 FOR IP): Performed by: HOSPITALIST

## 2022-02-12 PROCEDURE — 94761 N-INVAS EAR/PLS OXIMETRY MLT: CPT

## 2022-02-12 PROCEDURE — 85025 COMPLETE CBC W/AUTO DIFF WBC: CPT

## 2022-02-12 PROCEDURE — 6360000002 HC RX W HCPCS: Performed by: HOSPITALIST

## 2022-02-12 PROCEDURE — 99232 SBSQ HOSP IP/OBS MODERATE 35: CPT | Performed by: PHYSICIAN ASSISTANT

## 2022-02-12 PROCEDURE — 2580000003 HC RX 258: Performed by: INTERNAL MEDICINE

## 2022-02-12 PROCEDURE — 6360000002 HC RX W HCPCS: Performed by: INTERNAL MEDICINE

## 2022-02-12 PROCEDURE — 86141 C-REACTIVE PROTEIN HS: CPT

## 2022-02-12 PROCEDURE — 82962 GLUCOSE BLOOD TEST: CPT

## 2022-02-12 PROCEDURE — 6370000000 HC RX 637 (ALT 250 FOR IP): Performed by: INTERNAL MEDICINE

## 2022-02-12 PROCEDURE — 6370000000 HC RX 637 (ALT 250 FOR IP): Performed by: FAMILY MEDICINE

## 2022-02-12 PROCEDURE — 83735 ASSAY OF MAGNESIUM: CPT

## 2022-02-12 PROCEDURE — 99233 SBSQ HOSP IP/OBS HIGH 50: CPT | Performed by: INTERNAL MEDICINE

## 2022-02-12 PROCEDURE — 80048 BASIC METABOLIC PNL TOTAL CA: CPT

## 2022-02-12 PROCEDURE — 84100 ASSAY OF PHOSPHORUS: CPT

## 2022-02-12 PROCEDURE — 1200000000 HC SEMI PRIVATE

## 2022-02-12 PROCEDURE — 2700000000 HC OXYGEN THERAPY PER DAY

## 2022-02-12 RX ORDER — INSULIN GLARGINE 100 [IU]/ML
5 INJECTION, SOLUTION SUBCUTANEOUS NIGHTLY
Status: DISCONTINUED | OUTPATIENT
Start: 2022-02-12 | End: 2022-02-19 | Stop reason: HOSPADM

## 2022-02-12 RX ORDER — DEXTROSE AND SODIUM CHLORIDE 5; .9 G/100ML; G/100ML
INJECTION, SOLUTION INTRAVENOUS CONTINUOUS
Status: DISCONTINUED | OUTPATIENT
Start: 2022-02-12 | End: 2022-02-14

## 2022-02-12 RX ADMIN — Medication 2000 UNITS: at 08:24

## 2022-02-12 RX ADMIN — CEFEPIME HYDROCHLORIDE 2000 MG: 2 INJECTION, POWDER, FOR SOLUTION INTRAVENOUS at 02:41

## 2022-02-12 RX ADMIN — FAMOTIDINE 10 MG: 20 TABLET, FILM COATED ORAL at 08:24

## 2022-02-12 RX ADMIN — DEXTROSE AND SODIUM CHLORIDE: 5; 900 INJECTION, SOLUTION INTRAVENOUS at 16:50

## 2022-02-12 RX ADMIN — SODIUM CHLORIDE, PRESERVATIVE FREE 10 ML: 5 INJECTION INTRAVENOUS at 08:25

## 2022-02-12 RX ADMIN — ENOXAPARIN SODIUM 30 MG: 100 INJECTION SUBCUTANEOUS at 08:25

## 2022-02-12 RX ADMIN — CALCIUM GLUCONATE: 98 INJECTION, SOLUTION INTRAVENOUS at 18:41

## 2022-02-12 RX ADMIN — ALBUTEROL SULFATE 2 PUFF: 90 AEROSOL, METERED RESPIRATORY (INHALATION) at 11:34

## 2022-02-12 RX ADMIN — ASPIRIN 81 MG CHEWABLE TABLET 81 MG: 81 TABLET CHEWABLE at 08:24

## 2022-02-12 RX ADMIN — IPRATROPIUM BROMIDE 2 PUFF: 17 AEROSOL, METERED RESPIRATORY (INHALATION) at 11:34

## 2022-02-12 RX ADMIN — POTASSIUM BICARBONATE 10 MEQ: 782 TABLET, EFFERVESCENT ORAL at 08:24

## 2022-02-12 RX ADMIN — VANCOMYCIN HYDROCHLORIDE 1000 MG: 1 INJECTION, POWDER, LYOPHILIZED, FOR SOLUTION INTRAVENOUS at 16:53

## 2022-02-12 RX ADMIN — PANTOPRAZOLE SODIUM 40 MG: 40 INJECTION, POWDER, FOR SOLUTION INTRAVENOUS at 22:02

## 2022-02-12 RX ADMIN — SODIUM BICARBONATE: 84 INJECTION, SOLUTION INTRAVENOUS at 10:01

## 2022-02-12 RX ADMIN — ALBUTEROL SULFATE 2 PUFF: 90 AEROSOL, METERED RESPIRATORY (INHALATION) at 08:43

## 2022-02-12 RX ADMIN — IPRATROPIUM BROMIDE 2 PUFF: 17 AEROSOL, METERED RESPIRATORY (INHALATION) at 08:44

## 2022-02-12 RX ADMIN — POTASSIUM BICARBONATE 10 MEQ: 782 TABLET, EFFERVESCENT ORAL at 22:03

## 2022-02-12 RX ADMIN — CEFEPIME HYDROCHLORIDE 2000 MG: 2 INJECTION, POWDER, FOR SOLUTION INTRAVENOUS at 14:24

## 2022-02-12 RX ADMIN — DEXAMETHASONE SODIUM PHOSPHATE 6 MG: 4 INJECTION, SOLUTION INTRAMUSCULAR; INTRAVENOUS at 08:25

## 2022-02-12 RX ADMIN — OXYCODONE HYDROCHLORIDE 2.5 MG: 5 TABLET ORAL at 08:24

## 2022-02-12 RX ADMIN — INSULIN GLARGINE 5 UNITS: 100 INJECTION, SOLUTION SUBCUTANEOUS at 22:05

## 2022-02-12 RX ADMIN — PANTOPRAZOLE SODIUM 40 MG: 40 INJECTION, POWDER, FOR SOLUTION INTRAVENOUS at 08:24

## 2022-02-12 RX ADMIN — METOPROLOL TARTRATE 50 MG: 50 TABLET, FILM COATED ORAL at 08:24

## 2022-02-12 ASSESSMENT — PAIN SCALES - GENERAL
PAINLEVEL_OUTOF10: 0

## 2022-02-12 ASSESSMENT — PAIN SCALES - WONG BAKER
WONGBAKER_NUMERICALRESPONSE: 0
WONGBAKER_NUMERICALRESPONSE: 0

## 2022-02-12 NOTE — PROGRESS NOTES
Nephrology Progress Note  2/12/2022 2:00 PM  Subjective: Interval History: Chapo Sal is a 64 y.o. male weak resting in bed wife in the room    Data:   Scheduled Meds:   insulin glargine  5 Units SubCUTAneous Nightly    dexamethasone  6 mg IntraVENous Q24H    metoprolol tartrate  50 mg Oral BID    vancomycin  1,000 mg IntraVENous Q24H    insulin lispro  0-12 Units SubCUTAneous TID WC    insulin lispro  0-12 Units SubCUTAneous 2 times per day    oxyCODONE  2.5 mg Oral BID    LORazepam  0.5 mg Oral Nightly    lidocaine  5 mL IntraDERmal Once    sodium chloride flush  5-40 mL IntraVENous 2 times per day    potassium bicarb-citric acid  10 mEq Oral BID    cefepime  2,000 mg IntraVENous Q12H    albuterol sulfate HFA  2 puff Inhalation 4x daily    ipratropium  2 puff Inhalation 4x daily    enoxaparin  30 mg SubCUTAneous Daily    pantoprazole  40 mg IntraVENous BID    [Held by provider] gabapentin  300 mg Oral TID    [Held by provider] carBAMazepine  100 mg Oral BID    sodium chloride flush  5-40 mL IntraVENous 2 times per day    Vitamin D  2,000 Units Oral Daily    aspirin  81 mg Oral Daily    famotidine  10 mg Oral Q48H     Continuous Infusions:   IV infusion builder 75 mL/hr at 02/12/22 1001    sodium chloride      dextrose           CBC   Recent Labs     02/11/22  1116   WBC 12.1*   HGB 11.6*   HCT 36.6*   PLT 97*      BMP   Recent Labs     02/10/22  0827 02/10/22  1213 02/11/22  1116     --  140   K 4.1  --  3.3*   *  --  107   CO2 9*  --  18*   PHOS  --  1.6* 1.8*   BUN 54*  --  53*   CREATININE 1.7*  --  1.7*     Hepatic:   No results for input(s): AST, ALT, ALB, BILITOT, ALKPHOS in the last 72 hours. Troponin: No results for input(s): TROPONINI in the last 72 hours. BNP: No results for input(s): BNP in the last 72 hours. Lipids: No results for input(s): CHOL, HDL in the last 72 hours.     Invalid input(s): LDLCALCU  ABGs:   Lab Results   Component Value Date PO2ART 121 02/09/2022    HTE5TME 25.0 02/09/2022     INR:   No results for input(s): INR in the last 72 hours. Renal Labs  Albumin:    Lab Results   Component Value Date    LABALBU 2.7 02/07/2022    LABALBU 100 05/30/2012     Calcium:    Lab Results   Component Value Date    CALCIUM 7.6 02/11/2022     Phosphorus:    Lab Results   Component Value Date    PHOS 1.8 02/11/2022     U/A:    Lab Results   Component Value Date    NITRU NEGATIVE 02/04/2022    COLORU YELLOW 02/04/2022    WBCUA 2 02/04/2022    RBCUA <1 02/04/2022    MUCUS RARE 01/31/2022    BACTERIA RARE 02/04/2022    CLARITYU CLEAR 02/04/2022    SPECGRAV 1.020 02/04/2022    UROBILINOGEN 0.2 02/04/2022    BILIRUBINUR NEGATIVE 02/04/2022    BLOODU SMALL 02/04/2022    KETUA NEGATIVE 02/04/2022     ABG:    Lab Results   Component Value Date    IYJ6GLX 25.0 02/09/2022    PO2ART 121 02/09/2022    USL5PKM 15.1 02/09/2022     HgBA1c:    Lab Results   Component Value Date    LABA1C 6.0 01/29/2022     Microalbumen/Creatinine ratio:  No components found for: RUCREAT  TSH:  No results found for: TSH  IRON:  No results found for: IRON  Iron Saturation:  No components found for: PERCENTFE  TIBC:  No results found for: TIBC  FERRITIN:    Lab Results   Component Value Date    FERRITIN 2,498 01/28/2022     RPR:  No results found for: RPR  JEREMIE:  No results found for: ANATITER, JEREMIE  24 Hour Urine for Creatinine Clearance:  No components found for: CREAT4, UHRS10, UTV10      Objective:   I/O: No intake/output data recorded. No intake/output data recorded. No intake/output data recorded. Vitals: /77   Pulse 80   Temp 98.1 °F (36.7 °C) (Oral)   Resp 21   Ht 6' (1.829 m)   Wt 154 lb 1.6 oz (69.9 kg)   SpO2 97%   BMI 20.90 kg/m²  {  General appearance: Weak lethargic  HEENT: Head: Normal, normocephalic, atraumatic.   Neck: supple, symmetrical, trachea midline  Lungs: diminished breath sounds bilaterally  Heart: S1, S2 normal  Abdomen: abnormal findings:  soft nt  Extremities: edema trace  Neurologic: Mental status: alertness: lethargic        Assessment and Plan:      IMP:  #1 COVID-19 pneumonia  #2 hypernatremia  #3 acute renal failure from ATN on CKD 3 creatinine 1.6  #4 type 2 diabetes  #5 hypertension  #6 weakness with some mild confusion  #7 metabolic acidosis    Plan     #1 treated for COVID-19  #2 sodium holding stable  #3 creatinine holding close to baseline  #4 monitor glucose  #5 blood pressure overall stable  #6 monitor affect not improved  #7 treat acidosis  Overall supportive care overall guarded prognosis nutrition appears to be holding stable as well we will follow         Francois Pena MD, MD

## 2022-02-12 NOTE — PROGRESS NOTES
Hospitalist Progress Note      Name:  Reji Ruffin /Age/Sex: 1945  (64 y.o. male)   MRN & CSN:  [id number] & [id number] Admission Date/Time: 2022  1:32 PM   Location:  [facility]- PCP: Mami Kemp MD       Hospital Day: 16    Assessment and Plan:     Patient is 68-year-old male past medical history of chronic pain syndrome, chronic oxycodone use, diabetes, hypertension, hyperlipidemia who was admitted for confusion, generalized weakness. Patient was found to have COVID-19 pneumonia. Patient continues to require oxygen. He is on  baricitinib, dexamethasone and antibiotics. He had unwitnessed fall  night,CT head neg. #.  Acute hypoxic respiratory failure due to viral pneumonia from COVID-19  #. Viral pneumonia from COVID-19  # Bacterial PNA with sepsis  On admission,patient's oxygen requirement slowly worsened to 15 L but now slowly improved. He is on 7L . Continue to monitor. Continue bronchodilators   Continue dexamethasone,6 , decreased to daily   Completed baricitinib  Completed zithromax  Seen by ID  and started on vanc and cefepime given elevated CRP/procal suspicious for bacterial infection. Monitor CRP and procalcitonin 16 and 0.197 respectively today. Follow blood cultures and MRSA screen  Repeat CXR -Bilateral hazy parenchymal opacities greater on the left compatible with pneumonia. LFT's normal.  Pulm following . Monitor. #.  Encephalopathy, likely metabolic  Suspect metabolic encephalopathy: Due to COVID-19 pneumonia,hypoxia and meds. Discontinued restraints  Continue to reorient patient. Wife at bedside and assisting. Ammonia level good at 17. B12 ,folate, TSH normal  Held CNS depressant meds including Neurontin, Tegretol and Roxicodone. Nephrology recommended to hold Tegretol. Wife requested restarting his pain med. Restart roxicodone,lower dose at less freq. CT head 22 neg. Neuro consulted,recommends MRI.  This is pending. Monitor. #.  Acute kidney injury likely due to ATN likely due to prerenal azotemia  # Metabolic acidosis. Nephrology following  Cr improved at 1.6. Bicarb has normalized. May be partly from lactic acidosis. Beta hydroxybutyrate 1.0 so unlikely starvation acidosis  Received several amps of bicarb and currently on bicarb drip and tabs . Will dc bicarb drip. Continue to monitor    #. Hypernatremia   Resolved    Hypophosphatemia/hypokalemia  Replaced. Both normal.     #.  Sinus tachycardia/junctional tachycardia  Cardiology following  Continue metoprolol,bp soft so decrease from 50 mg twice daily to 25 mg bid and monitor. .  Improved. #.  Generalized weakness and physical deconditioning  PT/OT  ? Rehab once medically stable for discharge     #. Other medical problems include hypertension, hyperlipidemia, DM with hyperglycemia  Continue current medications      # Poor oral intake  Eating more with wife feeding him but still not enough  RestartTPN     # Hyperglycemia  Endocrinology following. On low sliding scale. DVT prophylaxis: Lovenox  CODE STATUS: Total support    Subjective. :     Patient was seen and examined this morning . Wife at bedside and care d/w her. Pt eating more. Wife complained the food they get for him is small and she will like to order for him if possible. D/W RN and she will give her the menu to order for him. Objective:   Vitals:   Vitals:    02/12/22 1521   BP: (!) 92/56   Pulse: 80   Resp: 16   Temp:    SpO2: 90%       Physical Exam:   GEN: Awake, alert but confused, in no apparent distress. HEENT: Atraumatic, normocephalic, PERRLA, EOMI  NECK: Supple, no apparent thyromegaly or masses. RESP: Bilateral good air entry, slight right basal crackles. CARDIO/VASC:Regular rate and rhythm, normal S1, S2, no murmurs  GI: Abdomen is soft, nontender, no significant organomegaly noted, bowel sounds present  MSK: No gross joint deformities.   Skin: No significant rashes, skin lesions noted  Neuro: AOx1, cranial nerves grossly intact, no focal motor or sensory deficits   PSYCH: Affect appropriate    Medications:   Medications:    insulin glargine  5 Units SubCUTAneous Nightly    metoprolol tartrate  25 mg Oral BID    dexamethasone  6 mg IntraVENous Q24H    vancomycin  1,000 mg IntraVENous Q24H    insulin lispro  0-12 Units SubCUTAneous TID WC    insulin lispro  0-12 Units SubCUTAneous 2 times per day    oxyCODONE  2.5 mg Oral BID    LORazepam  0.5 mg Oral Nightly    lidocaine  5 mL IntraDERmal Once    sodium chloride flush  5-40 mL IntraVENous 2 times per day    potassium bicarb-citric acid  10 mEq Oral BID    cefepime  2,000 mg IntraVENous Q12H    albuterol sulfate HFA  2 puff Inhalation 4x daily    ipratropium  2 puff Inhalation 4x daily    enoxaparin  30 mg SubCUTAneous Daily    pantoprazole  40 mg IntraVENous BID    [Held by provider] gabapentin  300 mg Oral TID    [Held by provider] carBAMazepine  100 mg Oral BID    sodium chloride flush  5-40 mL IntraVENous 2 times per day    Vitamin D  2,000 Units Oral Daily    aspirin  81 mg Oral Daily    famotidine  10 mg Oral Q48H      Infusions:    dextrose 5 % and 0.9 % NaCl      sodium chloride      dextrose       PRN Meds: sodium chloride flush, 5-40 mL, PRN  sodium chloride, 25 mL, PRN  glucose, 15 g, PRN  glucagon (rDNA), 1 mg, PRN  dextrose, 100 mL/hr, PRN  dextrose bolus (hypoglycemia), 125 mL, PRN   Or  dextrose bolus (hypoglycemia), 250 mL, PRN  sodium chloride flush, 5-40 mL, PRN  ondansetron, 4 mg, Q8H PRN   Or  ondansetron, 4 mg, Q6H PRN  polyethylene glycol, 17 g, Daily PRN  acetaminophen, 650 mg, Q6H PRN   Or  acetaminophen, 650 mg, Q6H PRN        Electronically signed by Dominick Owens MD on 2/12/2022 at 3:58 PM

## 2022-02-12 NOTE — CONSULTS
< > 1.8*   < > 3.1   GLUCOSE 110*  --  217*  --  266*    < > = values in this interval not displayed. RENAL LAB EVALUATION  Estimated Creatinine Clearance: 38 mL/min (A) (based on SCr of 1.6 mg/dL (H)). Recent Labs     02/10/22  0827 02/11/22  1116 02/12/22  1420   BUN 54* 53* 40*   CREATININE 1.7* 1.7* 1.6*     Renal Function and Electrolytes:   Na - WNL   K - WNL    Ca - WNL (corrected)   Mg - WNL   Phos - WNL   Only calcium gluconate in the current formula   MARCO on CKD3, nephrology following. Formulation: Continue AA 4.25 + Dex 10% with custom electrolytes at starting rate of 42 ml/hr. Standard lipids four times weekly starting Monday. Component Order Dose Admin Dose   CLINIMIX 4.25/10 4.25 % Soln 2,000 mLs 2,000 mL   calcium gluconate 10 % Soln 2,000 mg 2,000 mg   insulin regular 100 UNIT/ML Soln 50 Units 50 Units   adult multi-vitamin with vitamin k Inj 10 mLs 10 mL   trace minerals Cu-Mn-Se-Zn 740-32- MCG/ML Soln 1 mL 1 mL     Pharmacy will continue to follow and adjust as appropriate. Thank you for the consult,    Nadine Carey.  Kirsten Bruno [facility]  2/12/2022 @ 5:20 PM

## 2022-02-12 NOTE — PROGRESS NOTES
Comprehensive Nutrition Assessment    Type and Reason for Visit:  Reassess    Nutrition Recommendations/Plan:     Continue diet and nutrition supplements. Diet consistency per speech; no other restrictions indicated at this time. Please continue to assist with meals TID. Please record oral intake. Nutrition Assessment:  Pt continues with confusion which neurology is evaluating. Wife coming in to feed pt. Per progress notes, pt PO intake improving. No PO intake documented in flowsheets. PN was stopped on 2/10. Malnutrition Assessment:  Malnutrition Status:  Insufficient data    Context:  Acute Illness       Estimated Daily Nutrient Needs:  Energy (kcal):  1068-0347; Weight Used for Energy Requirements:  Current     Protein (g):  80-90; Weight Used for Protein Requirements:  Ideal        Fluid (ml/day):  1500; Method Used for Fluid Requirements:  1 ml/kcal      Nutrition Related Findings:  Na 140, K+ 3.3, Glucose 100-290, 7L HFNC      Wounds:  None       Current Nutrition Therapies:    ADULT ORAL NUTRITION SUPPLEMENT; Breakfast, Lunch; Fortified Pudding Oral Supplement  ADULT ORAL NUTRITION SUPPLEMENT; Lunch, Dinner; Frozen Oral Supplement  ADULT DIET;  Dysphagia - Pureed; Mildly Thick (Nectar)    Anthropometric Measures:  · Height: 6' (182.9 cm)  · Current Body Weight: 154 lb 1.6 oz (69.9 kg)   · Admission Body Weight: 157 lb (71.2 kg) (ADELE)    · Usual Body Weight:  (ADELE)     · Ideal Body Weight: 178 lbs; % Ideal Body Weight 88.2 %   · BMI: 20.9  · BMI Categories: Underweight (BMI less than 22) age over 72       Nutrition Diagnosis:   · Inadequate oral intake related to acute injury/trauma as evidenced by intake 0-25%      Nutrition Interventions:   Food and/or Nutrient Delivery:  Continue Current Diet,Continue Oral Nutrition Supplement  Nutrition Education/Counseling:  No recommendation at this time   Coordination of Nutrition Care:  Continue to monitor while inpatient,Feeding Assistance/Environment Change,Speech Therapy,Swallow Evaluation    Goals:  Pt will consume at least half of his meals and supplements       Nutrition Monitoring and Evaluation:   Behavioral-Environmental Outcomes:  None Identified   Food/Nutrient Intake Outcomes:  Food and Nutrient Intake,Supplement Intake  Physical Signs/Symptoms Outcomes:  Biochemical Data,Chewing or Swallowing,GI Status,Meal Time Behavior,Nutrition Focused Physical Findings,Weight     Discharge Planning:    Continue Oral Nutrition Supplement,Continue current diet     Electronically signed by Basil Bullard RD, LD on 2/12/22 at 3:31 PM EST    Contact: [phone]

## 2022-02-12 NOTE — PROGRESS NOTES
Pt needs to be screened by family. Pt is not A & O to answer own surgical hx for MRI exam.  MRI reached out to family with no answer, no voice mail on wife's number. Will attempt MRI exam when pt is screened.

## 2022-02-12 NOTE — PLAN OF CARE
Problem: Airway Clearance - Ineffective  Goal: Achieve or maintain patent airway  Outcome: Ongoing     Problem: Gas Exchange - Impaired  Goal: Absence of hypoxia  Outcome: Ongoing  Goal: Promote optimal lung function  Outcome: Ongoing     Problem: Breathing Pattern - Ineffective  Goal: Ability to achieve and maintain a regular respiratory rate  Outcome: Ongoing     Problem:  Body Temperature -  Risk of, Imbalanced  Goal: Ability to maintain a body temperature within defined limits  Outcome: Ongoing  Goal: Will regain or maintain usual level of consciousness  Outcome: Ongoing  Goal: Complications related to the disease process, condition or treatment will be avoided or minimized  Outcome: Ongoing     Problem: Isolation Precautions - Risk of Spread of Infection  Goal: Prevent transmission of infection  Outcome: Ongoing     Problem: Nutrition Deficits  Goal: Optimize nutritional status  Outcome: Ongoing     Problem: Risk for Fluid Volume Deficit  Goal: Maintain normal heart rhythm  Outcome: Ongoing  Goal: Maintain absence of muscle cramping  Outcome: Ongoing  Goal: Maintain normal serum potassium, sodium, calcium, phosphorus, and pH  Outcome: Ongoing     Problem: Loneliness or Risk for Loneliness  Goal: Demonstrate positive use of time alone when socialization is not possible  Outcome: Ongoing     Problem: Fatigue  Goal: Verbalize increase energy and improved vitality  Outcome: Ongoing     Problem: Patient Education: Go to Patient Education Activity  Goal: Patient/Family Education  Outcome: Ongoing     Problem: Falls - Risk of:  Goal: Will remain free from falls  Description: Will remain free from falls  Outcome: Ongoing  Goal: Absence of physical injury  Description: Absence of physical injury  Outcome: Ongoing     Problem: Skin Integrity:  Goal: Will show no infection signs and symptoms  Description: Will show no infection signs and symptoms  Outcome: Ongoing  Goal: Absence of new skin breakdown  Description: Absence of new skin breakdown  Outcome: Ongoing     Problem: Pain:  Description: Pain management should include both nonpharmacologic and pharmacologic interventions.   Goal: Pain level will decrease  Description: Pain level will decrease  Outcome: Ongoing  Goal: Control of acute pain  Description: Control of acute pain  Outcome: Ongoing  Goal: Control of chronic pain  Description: Control of chronic pain  Outcome: Ongoing     Problem: Nutrition  Goal: Optimal nutrition therapy  Outcome: Ongoing

## 2022-02-12 NOTE — PROGRESS NOTES
Infectious Disease Progress Note  2022   Patient Name: Suly Lora : 1945       Reason for visit: F/u COVID-19 pneumonia, acute respiratory failure? History:? Interval history noted  Remains confused. Physical Exam:  Vital Signs: /77   Pulse 80   Temp 98.1 °F (36.7 °C) (Oral)   Resp 21   Ht 6' (1.829 m)   Wt 154 lb 1.6 oz (69.9 kg)   SpO2 97%   BMI 20.90 kg/m²     Gen: Awake, not responsive to questions. On high flow nasal cannula. Skin: no stigmata of endocarditis  Wounds: C/D/I  HEMT: AT/NC Oropharynx pink, moist, and without lesions or exudates; dentition in good state of repair  Eyes: PERRLA, EOMI, conjunctiva pink, sclera anicteric. Neck: Supple. Trachea midline. No LAD. Chest: Reduced air entry bilaterally  Heart: RRR  Abd: soft, non-distended, no tenderness, no hepatomegaly. Normoactive bowel sounds. Ext: no clubbing, cyanosis, or edema  Catheter Site: without erythema or tenderness  LDA:   Neuro: Mental status intact. CN 2-12 intact and no focal sensory or motor deficits     Radiologic / Imaging / TESTING  No results found.      Labs:    Recent Results (from the past 24 hour(s))   Magnesium    Collection Time: 22 11:16 AM   Result Value Ref Range    Magnesium 1.8 1.8 - 2.4 mg/dl   Phosphorus    Collection Time: 22 11:16 AM   Result Value Ref Range    Phosphorus 1.8 (L) 2.5 - 4.9 MG/DL   Basic metabolic panel    Collection Time: 22 11:16 AM   Result Value Ref Range    Sodium 140 135 - 145 MMOL/L    Potassium 3.3 (L) 3.5 - 5.1 MMOL/L    Chloride 107 99 - 110 mMol/L    CO2 18 (L) 21 - 32 MMOL/L    Anion Gap 15 4 - 16    BUN 53 (H) 6 - 23 MG/DL    CREATININE 1.7 (H) 0.9 - 1.3 MG/DL    Glucose 217 (H) 70 - 99 MG/DL    Calcium 7.6 (L) 8.3 - 10.6 MG/DL    GFR Non- 39 (L) >60 mL/min/1.73m2    GFR  47 (L) >60 mL/min/1.73m2   C-Reactive Protein    Collection Time: 22 11:16 AM   Result Value Ref Range    CRP, High Sensitivity 8.2 mg/L   Vancomycin, random    Collection Time: 02/11/22 11:16 AM   Result Value Ref Range    Vancomycin Rm 13.4 UG/ML    DOSE AMOUNT DOSE AMT.  GIVEN - NONE     DOSE TIME DOSE TIME GIVEN - NONE    Procalcitonin    Collection Time: 02/11/22 11:16 AM   Result Value Ref Range    Procalcitonin 0.193    CBC auto differential    Collection Time: 02/11/22 11:16 AM   Result Value Ref Range    WBC 12.1 (H) 4.0 - 10.5 K/CU MM    RBC 3.74 (L) 4.6 - 6.2 M/CU MM    Hemoglobin 11.6 (L) 13.5 - 18.0 GM/DL    Hematocrit 36.6 (L) 42 - 52 %    MCV 97.9 78 - 100 FL    MCH 31.0 27 - 31 PG    MCHC 31.7 (L) 32.0 - 36.0 %    RDW 14.1 11.7 - 14.9 %    Platelets 97 (L) 573 - 440 K/CU MM    MPV 10.6 7.5 - 11.1 FL    Differential Type AUTOMATED DIFFERENTIAL     Segs Relative 85.7 (H) 36 - 66 %    Lymphocytes % 7.2 (L) 24 - 44 %    Monocytes % 4.3 (H) 0 - 4 %    Eosinophils % 0.4 0 - 3 %    Basophils % 0.2 0 - 1 %    Segs Absolute 10.3 K/CU MM    Lymphocytes Absolute 0.9 K/CU MM    Monocytes Absolute 0.5 K/CU MM    Eosinophils Absolute 0.1 K/CU MM    Basophils Absolute 0.0 K/CU MM    Nucleated RBC % 0.0 %    Total Nucleated RBC 0.0 K/CU MM    Total Immature Neutrophil 0.26 K/CU MM    Immature Neutrophil % 2.2 (H) 0 - 0.43 %   Beta-Hydroxybutyrate    Collection Time: 02/11/22 11:16 AM   Result Value Ref Range    Beta-Hydroxybutyrate 1.0 0.0 - 3.0 MG/DL   Lactate, Sepsis    Collection Time: 02/11/22 11:16 AM   Result Value Ref Range    Lactic Acid, Sepsis 3.4 (HH) 0.5 - 1.9 mMOL/L   POCT Glucose    Collection Time: 02/11/22 11:22 AM   Result Value Ref Range    POC Glucose 215 (H) 70 - 99 MG/DL   Prealbumin    Collection Time: 02/11/22  3:41 PM   Result Value Ref Range    Prealbumin 26 20 - 40 mg/dL   Lactic Acid, Plasma    Collection Time: 02/11/22  3:41 PM   Result Value Ref Range    Lactate 2.8 (HH) 0.4 - 2.0 mMOL/L   POCT Glucose    Collection Time: 02/11/22  3:51 PM   Result Value Ref Range    POC Glucose 290 (H) 70 - 99 MG/DL   POCT  Diagnostic: Trend CRP and procalcitonin   F/u:    Other: Evaluation ongoing for encephalopathy.       Electronically signed by: Electronically signed by Charis Sepulveda MD on 2/12/2022 at 10:49 AM

## 2022-02-12 NOTE — PROGRESS NOTES
Neurology Service Progress Note  [facility]  Patient Name: Roxane Velez  : 1945        Subjective:   Reason for consult: altered mental status  Patient seen and examined this morning. Chart reviewed in detail and discussed with wife present at bedside. Upon entering the room patient was laying in the bed, wife feeding him breakfast.  He was moving all extremities particularly with tactile stimulus but was not participating in exam.  Would moan/groan as it was during exam and said a few incoherent words asking her to stop poking at him. I explained to wife at bedside that we are still waiting on MRI of the brain to take a good look to ensure/rule out any structural changes contributing to altered mental status. His exam is grossly nonfocal/lateralizing which is promising but need to further rule out as he could have been hypercoagulable with recent Covid 19 infection. She verbalized understanding and agreement and answered all questions to the best my ability.       Past Medical History:   Diagnosis Date    DM (diabetes mellitus) ([facility] [address]     Dysphagia     Dysphonia     Hyperlipidemia     Hypertension     Kidney failure     Osteoarthritis     :   Past Surgical History:   Procedure Laterality Date    CERVICAL SPINE SURGERY      3 years ago    ENDOSCOPY, COLON, DIAGNOSTIC  2016    mild inflammation at the squamocolumnar junction- biopsied, no hiatal hernia, diffuse gastric erythema- biopsies to rule out chronic gastritis or intestinal metaplasia     Medications:  Scheduled Meds:   insulin glargine  5 Units SubCUTAneous Nightly    dexamethasone  6 mg IntraVENous Q24H    metoprolol tartrate  50 mg Oral BID    vancomycin  1,000 mg IntraVENous Q24H    insulin lispro  0-12 Units SubCUTAneous TID WC    insulin lispro  0-12 Units SubCUTAneous 2 times per day    oxyCODONE  2.5 mg Oral BID    LORazepam  0.5 mg Oral Nightly    lidocaine  5 mL IntraDERmal Once    sodium chloride flush  5-40 mL IntraVENous 2 times per day    potassium bicarb-citric acid  10 mEq Oral BID    cefepime  2,000 mg IntraVENous Q12H    albuterol sulfate HFA  2 puff Inhalation 4x daily    ipratropium  2 puff Inhalation 4x daily    enoxaparin  30 mg SubCUTAneous Daily    pantoprazole  40 mg IntraVENous BID    [Held by provider] gabapentin  300 mg Oral TID    [Held by provider] carBAMazepine  100 mg Oral BID    sodium chloride flush  5-40 mL IntraVENous 2 times per day    Vitamin D  2,000 Units Oral Daily    aspirin  81 mg Oral Daily    famotidine  10 mg Oral Q48H     Continuous Infusions:   IV infusion builder 75 mL/hr at 22 1001    sodium chloride      dextrose       PRN Meds:.sodium chloride flush, sodium chloride, glucose, glucagon (rDNA), dextrose, dextrose bolus (hypoglycemia) **OR** dextrose bolus (hypoglycemia), sodium chloride flush, ondansetron **OR** ondansetron, polyethylene glycol, acetaminophen **OR** acetaminophen    No Known Allergies  Social History     Socioeconomic History    Marital status:      Spouse name: Not on file    Number of children: Not on file    Years of education: Not on file    Highest education level: Not on file   Occupational History    Not on file   Tobacco Use    Smoking status: Former Smoker     Years: 50.00     Quit date: 2010     Years since quittin.1    Smokeless tobacco: Not on file   Substance and Sexual Activity    Alcohol use: No    Drug use: No    Sexual activity: Not on file   Other Topics Concern    Not on file   Social History Narrative    Not on file     Social Determinants of Health     Financial Resource Strain:     Difficulty of Paying Living Expenses: Not on file   Food Insecurity:     Worried About Running Out of Food in the Last Year: Not on file    Sharon of Food in the Last Year: Not on file   Transportation Needs:     Lack of Transportation (Medical):  Not on file    Lack of Transportation (Non-Medical): Not on file   Physical Activity:     Days of Exercise per Week: Not on file    Minutes of Exercise per Session: Not on file   Stress:     Feeling of Stress : Not on file   Social Connections:     Frequency of Communication with Friends and Family: Not on file    Frequency of Social Gatherings with Friends and Family: Not on file    Attends Ca[employer] Services: Not on file    Active Member of [address] [place] [address] or Organizations: Not on file    Attends Club or Organization Meetings: Not on file    Marital Status: Not on file   Intimate Partner Violence:     Fear of Current or Ex-Partner: Not on file    Emotionally Abused: Not on file    Physically Abused: Not on file    Sexually Abused: Not on file   Housing Stability:     Unable to Pay for Housing in the Last Year: Not on file    Number of [place] in the Last Year: Not on file    Unstable Housing in the Last Year: Not on file      Family History   Problem Relation Age of Onset    Cancer Sister 61        colon cancer    Cancer Father 68        stomach cancer spread to liver         ROS (10 systems)  Unable to ask ROS questions due to patient being encephalopathic at this time.      Physical Exam:       Vitals:    02/12/22 0245 02/12/22 0818 02/12/22 0820 02/12/22 0832   BP:  132/77     Pulse:  84 81 80   Resp:  27  21   Temp:  98.1 °F (36.7 °C)     TempSrc:  Oral     SpO2: 92% 91%  97%   Weight:  154 lb 1.6 oz (69.9 kg)     Height:  6' (1.829 m)         Wt Readings from Last 3 Encounters:   02/12/22 154 lb 1.6 oz (69.9 kg)   03/14/17 200 lb (90.7 kg)   02/19/16 200 lb 6.4 oz (90.9 kg)     Temp Readings from Last 3 Encounters:   02/12/22 98.1 °F (36.7 °C) (Oral)   03/14/17 98.7 °F (37.1 °C) (Oral)   02/19/16 97.7 °F (36.5 °C) (Temporal)     BP Readings from Last 3 Encounters:   02/12/22 132/77   03/14/17 (!) 139/91   02/19/16 160/78     Pulse Readings from Last 3 Encounters:   02/12/22 80   03/14/17 82   02/19/16 79        Gen: awake, drowsy, difficult to examine orientation secondary to encephalopathic state. NAD, mumbles, severe dysarthric speech pattern follows simple commands intermittently    HEENT: NC/AT, EOMI, does not track in room, however does blink to threat PERRL, mmm, neck supple, no meningeal signs; Heart: No evidence of clubbing or cyanosis  Lungs: Respirations Unlabored  Ext: no edema,   Psych: Unable to assess  Skin: no rashes or lesions    NEUROLOGIC EXAM:    Mental Status: awake, drowsy, difficult to examine orientation secondary to encephalopathic state. NAD, mumbles, severe dysarthric speech pattern follows simple commands intermittently    Cranial Nerve Exam:   CN II-XII: PERRL, no nystagmus, no gaze paresis, face appears symmetrical; hearing intact to conversational tone,    Motor Exam:       Moves extremities spontaneously   Tone and bulk normal   No pronator drift    Deep Tendon Reflexes: 2/4 biceps, triceps, brachioradialis, patellar, and achilles b/l; flexor plantar responses b/l    Sensation: Moves extremities to touch and spontaneously     Coordination/Cerebellum:       Tremors--none      Gait and stance:      Gait: deferred      LABS:        CBC:   Recent Labs     02/11/22  1116   WBC 12.1*   RBC 3.74*   HGB 11.6*   HCT 36.6*   PLT 97*   MCV 97.9     BMP:    Recent Labs     02/11/22  1116      K 3.3*      CO2 18*   BUN 53*   CREATININE 1.7*   GLUCOSE 217*   CALCIUM 7.6*       IMAGING:    CTH     Impression   No acute intracranial abnormality     MRI head wo: pending    Above imaging reviewed in detail. ASSESSMENT/PLAN:    64 y.o. -male with history of T2DM, HLD, HTN, Kidney Failure presenting [facility] after EMS was called via the  department in which they were called for a well check. Patient chief complaint on presentation was poor appetite, cough and diarrhea.  Neurology being consulted for altered mental status likely Covid encephalopathy superimposed on decreased cognitive reserve. Physical exam limited, not able to assess orientation but motor function/sensation grossly non focal/lateralizing. Plan of care as follows:     1. AMS likely due to acute metabolic encephalopathy secondary to COVID-19 infection superimposed on decreased cognitive reserve. · CT head: see above  · MRI head wo: pending  · Pending lab work but most recently WBC trending down- continue to correct metabolic derangements  · Continue on stroke prevention with daily ASA 81 mg  · PT/OT/ST per their recommendations  · Further recommendation pending neurodiagnostics      Patient discussed with attending physician Dr. Nick Melgar    Thank you for allowing us to participate in the care of your patient. If there are any questions regarding evaluation please feel free to contact us.      HUMAIRA Kirby, 2/12/2022

## 2022-02-12 NOTE — PROGRESS NOTES
Progress Note( Dr. Rah Velazquez)    Subjective:   Admit Date: 1/28/2022  PCP: Salazar Aguilar MD    Admitted For : altered mental state with generalized weakness positive for Covid pneumonia    Consulted For: Better contr ol of blood glucose    Interval History:r patient mental condition bit better now     patient appeared to be confused at times  Currently eating little better when fed by his wife who is staying with him most of the time  On bicarbonate IV drip containing D5    Patient got acidotic started on bicarbonate drip  His metabolic acidosis got somewhat better    Denies any chest pains,   Yes SOB . On  oxygen  Denies nausea or vomiting. Eating much better fed by his wife  No new bowel or bladder symptoms. No intake or output data in the 24 hours ending 02/11/22 2132    DATA    CBC:   Recent Labs     02/11/22  1116   WBC 12.1*   HGB 11.6*   PLT 97*    CMP:  Recent Labs     02/09/22  0748 02/10/22  0827 02/11/22  1116    138 140   K 4.3 4.1 3.3*   * 114* 107   CO2 16* 9* 18*   BUN 49* 54* 53*   CREATININE 1.8* 1.7* 1.7*   CALCIUM 8.3 7.8* 7.6*     Lipids:   Lab Results   Component Value Date    TRIG 190 02/09/2022     Glucose:  Recent Labs     02/11/22  1122 02/11/22  1551 02/11/22 2026   POCGLU 215* 290* 247*     [id number]:  Lab Results   Component Value Date    LABA1C 6.0 01/29/2022     High Sensitivity TSH:   Lab Results   Component Value Date    TSHHS 0.335 02/01/2022     Free T3: No results found for: FT3  Free T4:  Lab Results   Component Value Date    T4FREE 1.28 02/01/2022       CT HEAD WO CONTRAST   Final Result   No acute intracranial abnormality. RECOMMENDATIONS:   Unavailable         XR CHEST PORTABLE   Final Result   Bilateral hazy parenchymal opacities greater on the left compatible with   pneumonia. Slightly diminished inspiratory effort. XR CHEST PORTABLE   Final Result   Left basilar infiltrates concerning for pneumonia.          XR CHEST PORTABLE Final Result   Mild bibasilar airspace disease, greater on the left, most likely pneumonia. VL DUP LOWER EXTREMITY VENOUS BILATERAL   Final Result   Left anterior tibial vein is not visualized and patient trying to get out of   bed during the exam.      No definite deep venous thrombosis is demonstrated. NM LUNG SCAN PERFUSION ONLY   Final Result   Low Probability for Pulmonary Embolus. CT HEAD WO CONTRAST   Final Result   1. No acute intracranial abnormality. 2. Diffuse parenchymal volume loss with mild-to-moderate chronic white matter   microvascular ischemic changes. 3. Chronic lacunar infarct in the right thalamus. XR CHEST PORTABLE   Final Result   Ill-defined left-greater-than-right bibasilar airspace disease most   consistent with pneumonia.          XR CHEST PORTABLE    (Results Pending)   MRI BRAIN WO CONTRAST    (Results Pending)        Scheduled Medicines   Medications:    [START ON 2/12/2022] dexamethasone  6 mg IntraVENous Q24H    metoprolol tartrate  50 mg Oral BID    vancomycin  1,000 mg IntraVENous Q24H    insulin lispro  0-12 Units SubCUTAneous TID WC    insulin lispro  0-12 Units SubCUTAneous 2 times per day    [Held by provider] insulin glargine  7 Units SubCUTAneous Nightly    oxyCODONE  2.5 mg Oral BID    LORazepam  0.5 mg Oral Nightly    lidocaine  5 mL IntraDERmal Once    sodium chloride flush  5-40 mL IntraVENous 2 times per day    potassium bicarb-citric acid  10 mEq Oral BID    cefepime  2,000 mg IntraVENous Q12H    albuterol sulfate HFA  2 puff Inhalation 4x daily    ipratropium  2 puff Inhalation 4x daily    enoxaparin  30 mg SubCUTAneous Daily    pantoprazole  40 mg IntraVENous BID    [Held by provider] gabapentin  300 mg Oral TID    [Held by provider] carBAMazepine  100 mg Oral BID    baricitinib  2 mg Oral Daily    sodium chloride flush  5-40 mL IntraVENous 2 times per day    Vitamin D  2,000 Units Oral Daily    aspirin  81 mg Oral Daily    famotidine  10 mg Oral Q48H      Infusions:    sodium bicarbonate infusion 75 mL/hr at 02/11/22 1615    sodium chloride      dextrose           Objective:   Vitals: /80   Pulse 99   Temp 97.8 °F (36.6 °C) (Oral)   Resp 24   Ht 6' (1.829 m)   Wt 160 lb 0.9 oz (72.6 kg)   SpO2 96%   BMI 21.71 kg/m²   General appearance: alert and cooperative with exam feels confused  Neck: no JVD or bruit  Thyroid : Normal lobes   Lungs: Has Vesicular Breath sounds onBiPAP by  Heart:  regular rate and rhythm  Abdomen: soft, non-tender; bowel sounds normal; no masses,  no organomegaly  Musculoskeletal: Normal  Extremities: extremities normal, , no edema  Neurologic:  Awake, alert, oriented to name, place and time. Appears to be confused cranial nerves II-XII are grossly intact. Motor is  intact. Sensory is intact. ,  and gait is abnormal.  And unstable    Assessment:     Patient Active Problem List:     Acute kidney injury ([facility] [address]     Diabetes mellitus type II mild       Bilateral pneumonia possibly Covid related      Possible encephalopathy metabolic versus drug          Plan:     1. Reviewed POC blood glucose . Labs and X ray results   2. Reviewed Current Medicines   3. Discontinue TPN now  4. On  Correction bolus Humalog/ Basal Lantus Insulin regime   5. Monitor Blood glucose frequently   6. Modified  the dose of Insulin/ other medicines as needed   7. Will follow     .      Bill Fields MD, MD

## 2022-02-12 NOTE — PROGRESS NOTES
Progress Note( Dr. Erwin Blanco)    Subjective:   Admit Date: 1/28/2022  PCP: Junnie Hashimoto, MD    Admitted For : altered mental state with generalized weakness positive for Covid pneumonia    Consulted For: Better control ol of blood glucose    Interval History:r patient mental condition bit better now     patient appeared to be confused at times  Currently eating little better when fed by his wife who is staying with him most of the time  D.C  bicarbonate IV drip she is metabolic acidosis improved  Started on containing D5 normal saline      Denies any chest pains,   Yes SOB . On  oxygen  Denies nausea or vomiting. Eating much better fed by his wife  No new bowel or bladder symptoms. No intake or output data in the 24 hours ending 02/12/22 1702    DATA    CBC:   Recent Labs     02/11/22  1116   WBC 12.1*   HGB 11.6*   PLT 97*    CMP:  Recent Labs     02/10/22  0827 02/11/22  1116 02/12/22  1420    140 142   K 4.1 3.3* 3.9   * 107 105   CO2 9* 18* 26   BUN 54* 53* 40*   CREATININE 1.7* 1.7* 1.6*   CALCIUM 7.8* 7.6* 7.4*     Lipids:   Lab Results   Component Value Date    TRIG 190 02/09/2022     Glucose:  Recent Labs     02/12/22  0620 02/12/22  1233 02/12/22  1649   POCGLU 108* 161* 245*     [id number]:  Lab Results   Component Value Date    LABA1C 6.0 01/29/2022     High Sensitivity TSH:   Lab Results   Component Value Date    TSHHS 0.335 02/01/2022     Free T3: No results found for: FT3  Free T4:  Lab Results   Component Value Date    T4FREE 1.28 02/01/2022       CT HEAD WO CONTRAST   Final Result   No acute intracranial abnormality. RECOMMENDATIONS:   Unavailable         XR CHEST PORTABLE   Final Result   Bilateral hazy parenchymal opacities greater on the left compatible with   pneumonia. Slightly diminished inspiratory effort. XR CHEST PORTABLE   Final Result   Left basilar infiltrates concerning for pneumonia.          XR CHEST PORTABLE   Final Result   Mild bibasilar airspace disease, greater on the left, most likely pneumonia. VL DUP LOWER EXTREMITY VENOUS BILATERAL   Final Result   Left anterior tibial vein is not visualized and patient trying to get out of   bed during the exam.      No definite deep venous thrombosis is demonstrated. NM LUNG SCAN PERFUSION ONLY   Final Result   Low Probability for Pulmonary Embolus. CT HEAD WO CONTRAST   Final Result   1. No acute intracranial abnormality. 2. Diffuse parenchymal volume loss with mild-to-moderate chronic white matter   microvascular ischemic changes. 3. Chronic lacunar infarct in the right thalamus. XR CHEST PORTABLE   Final Result   Ill-defined left-greater-than-right bibasilar airspace disease most   consistent with pneumonia.          XR CHEST PORTABLE    (Results Pending)   MRI BRAIN WO CONTRAST    (Results Pending)        Scheduled Medicines   Medications:    insulin glargine  5 Units SubCUTAneous Nightly    metoprolol tartrate  25 mg Oral BID    dexamethasone  6 mg IntraVENous Q24H    vancomycin  1,000 mg IntraVENous Q24H    insulin lispro  0-12 Units SubCUTAneous TID WC    insulin lispro  0-12 Units SubCUTAneous 2 times per day    oxyCODONE  2.5 mg Oral BID    LORazepam  0.5 mg Oral Nightly    lidocaine  5 mL IntraDERmal Once    sodium chloride flush  5-40 mL IntraVENous 2 times per day    potassium bicarb-citric acid  10 mEq Oral BID    cefepime  2,000 mg IntraVENous Q12H    albuterol sulfate HFA  2 puff Inhalation 4x daily    ipratropium  2 puff Inhalation 4x daily    enoxaparin  30 mg SubCUTAneous Daily    pantoprazole  40 mg IntraVENous BID    [Held by provider] gabapentin  300 mg Oral TID    [Held by provider] carBAMazepine  100 mg Oral BID    sodium chloride flush  5-40 mL IntraVENous 2 times per day    Vitamin D  2,000 Units Oral Daily    aspirin  81 mg Oral Daily    famotidine  10 mg Oral Q48H      Infusions:    dextrose 5 % and 0.9 % NaCl 50 mL/hr at 02/12/22 1650    sodium chloride      dextrose           Objective:   Vitals: BP (!) 92/56   Pulse 75   Temp 98.1 °F (36.7 °C) (Oral)   Resp 18   Ht 6' (1.829 m)   Wt 154 lb 1.6 oz (69.9 kg)   SpO2 90%   BMI 20.90 kg/m²   General appearance: alert and cooperative with exam feels confused  Neck: no JVD or bruit  Thyroid : Normal lobes   Lungs: Has Vesicular Breath sounds onBiPAP by  Heart:  regular rate and rhythm  Abdomen: soft, non-tender; bowel sounds normal; no masses,  no organomegaly  Musculoskeletal: Normal  Extremities: extremities normal, , no edema  Neurologic:  Awake, alert, oriented to name, place and time. Appears to be confused cranial nerves II-XII are grossly intact. Motor is  intact. Sensory is intact. ,  and gait is abnormal.  And unstable    Assessment:     Patient Active Problem List:     Acute kidney injury ([facility] [address]     Diabetes mellitus type II mild       Bilateral pneumonia possibly Covid related      Possible encephalopathy metabolic versus drug          Plan:     1. Reviewed POC blood glucose . Labs and X ray results   2. Reviewed Current Medicines   3. Discontinue TPN now  4. On  Correction bolus Humalog/ Basal Lantus Insulin regime  5. Backup he was placed on D5 normal saline  6. Monitor Blood glucose frequently   7. Modified  the dose of Insulin/ other medicines as needed   8. Will follow     .      Kevin Bowers MD, MD

## 2022-02-13 LAB
ANION GAP SERPL CALCULATED.3IONS-SCNC: 10 MMOL/L (ref 4–16)
BUN BLDV-MCNC: 36 MG/DL (ref 6–23)
CALCIUM SERPL-MCNC: 7.8 MG/DL (ref 8.3–10.6)
CHLORIDE BLD-SCNC: 103 MMOL/L (ref 99–110)
CO2: 25 MMOL/L (ref 21–32)
CREAT SERPL-MCNC: 1.4 MG/DL (ref 0.9–1.3)
DOSE AMOUNT: NORMAL
DOSE TIME: NORMAL
GFR AFRICAN AMERICAN: 59 ML/MIN/1.73M2
GFR NON-AFRICAN AMERICAN: 49 ML/MIN/1.73M2
GLUCOSE BLD-MCNC: 111 MG/DL (ref 70–99)
GLUCOSE BLD-MCNC: 124 MG/DL (ref 70–99)
GLUCOSE BLD-MCNC: 134 MG/DL (ref 70–99)
GLUCOSE BLD-MCNC: 151 MG/DL (ref 70–99)
GLUCOSE BLD-MCNC: 158 MG/DL (ref 70–99)
GLUCOSE BLD-MCNC: 186 MG/DL (ref 70–99)
MAGNESIUM: 1.6 MG/DL (ref 1.8–2.4)
PHOSPHORUS: 2.2 MG/DL (ref 2.5–4.9)
POTASSIUM SERPL-SCNC: 3.9 MMOL/L (ref 3.5–5.1)
SODIUM BLD-SCNC: 138 MMOL/L (ref 135–145)
VANCOMYCIN TROUGH: 12.8 UG/ML (ref 10–20)

## 2022-02-13 PROCEDURE — 2580000003 HC RX 258: Performed by: INTERNAL MEDICINE

## 2022-02-13 PROCEDURE — 6360000002 HC RX W HCPCS: Performed by: INTERNAL MEDICINE

## 2022-02-13 PROCEDURE — 6370000000 HC RX 637 (ALT 250 FOR IP): Performed by: INTERNAL MEDICINE

## 2022-02-13 PROCEDURE — 84100 ASSAY OF PHOSPHORUS: CPT

## 2022-02-13 PROCEDURE — 6360000002 HC RX W HCPCS: Performed by: HOSPITALIST

## 2022-02-13 PROCEDURE — C9113 INJ PANTOPRAZOLE SODIUM, VIA: HCPCS | Performed by: HOSPITALIST

## 2022-02-13 PROCEDURE — 94664 DEMO&/EVAL PT USE INHALER: CPT

## 2022-02-13 PROCEDURE — 83735 ASSAY OF MAGNESIUM: CPT

## 2022-02-13 PROCEDURE — 1200000000 HC SEMI PRIVATE

## 2022-02-13 PROCEDURE — 99232 SBSQ HOSP IP/OBS MODERATE 35: CPT | Performed by: PHYSICIAN ASSISTANT

## 2022-02-13 PROCEDURE — 94761 N-INVAS EAR/PLS OXIMETRY MLT: CPT

## 2022-02-13 PROCEDURE — 80202 ASSAY OF VANCOMYCIN: CPT

## 2022-02-13 PROCEDURE — 82962 GLUCOSE BLOOD TEST: CPT

## 2022-02-13 PROCEDURE — 94640 AIRWAY INHALATION TREATMENT: CPT

## 2022-02-13 PROCEDURE — 2700000000 HC OXYGEN THERAPY PER DAY

## 2022-02-13 PROCEDURE — 6370000000 HC RX 637 (ALT 250 FOR IP): Performed by: FAMILY MEDICINE

## 2022-02-13 PROCEDURE — 80048 BASIC METABOLIC PNL TOTAL CA: CPT

## 2022-02-13 PROCEDURE — 6370000000 HC RX 637 (ALT 250 FOR IP): Performed by: HOSPITALIST

## 2022-02-13 PROCEDURE — 36415 COLL VENOUS BLD VENIPUNCTURE: CPT

## 2022-02-13 PROCEDURE — 2500000003 HC RX 250 WO HCPCS: Performed by: INTERNAL MEDICINE

## 2022-02-13 RX ORDER — SODIUM CHLORIDE 0.9 % (FLUSH) 0.9 %
5-40 SYRINGE (ML) INJECTION PRN
Status: DISCONTINUED | OUTPATIENT
Start: 2022-02-13 | End: 2022-02-19 | Stop reason: HOSPADM

## 2022-02-13 RX ORDER — SODIUM CHLORIDE 9 MG/ML
25 INJECTION, SOLUTION INTRAVENOUS PRN
Status: DISCONTINUED | OUTPATIENT
Start: 2022-02-13 | End: 2022-02-19 | Stop reason: HOSPADM

## 2022-02-13 RX ORDER — SODIUM CHLORIDE 0.9 % (FLUSH) 0.9 %
5-40 SYRINGE (ML) INJECTION EVERY 12 HOURS SCHEDULED
Status: DISCONTINUED | OUTPATIENT
Start: 2022-02-13 | End: 2022-02-19 | Stop reason: HOSPADM

## 2022-02-13 RX ORDER — LIDOCAINE HYDROCHLORIDE 10 MG/ML
5 INJECTION, SOLUTION EPIDURAL; INFILTRATION; INTRACAUDAL; PERINEURAL ONCE
Status: DISCONTINUED | OUTPATIENT
Start: 2022-02-13 | End: 2022-02-15

## 2022-02-13 RX ADMIN — DEXAMETHASONE SODIUM PHOSPHATE 6 MG: 4 INJECTION, SOLUTION INTRAMUSCULAR; INTRAVENOUS at 08:16

## 2022-02-13 RX ADMIN — OXYCODONE HYDROCHLORIDE 2.5 MG: 5 TABLET ORAL at 08:17

## 2022-02-13 RX ADMIN — ENOXAPARIN SODIUM 30 MG: 100 INJECTION SUBCUTANEOUS at 08:16

## 2022-02-13 RX ADMIN — ASPIRIN 81 MG CHEWABLE TABLET 81 MG: 81 TABLET CHEWABLE at 08:16

## 2022-02-13 RX ADMIN — CALCIUM GLUCONATE: 98 INJECTION, SOLUTION INTRAVENOUS at 22:37

## 2022-02-13 RX ADMIN — METOPROLOL TARTRATE 25 MG: 25 TABLET, FILM COATED ORAL at 08:21

## 2022-02-13 RX ADMIN — ALBUTEROL SULFATE 2 PUFF: 90 AEROSOL, METERED RESPIRATORY (INHALATION) at 16:48

## 2022-02-13 RX ADMIN — LORAZEPAM 0.5 MG: 0.5 TABLET ORAL at 22:43

## 2022-02-13 RX ADMIN — CEFEPIME HYDROCHLORIDE 2000 MG: 2 INJECTION, POWDER, FOR SOLUTION INTRAVENOUS at 13:24

## 2022-02-13 RX ADMIN — PANTOPRAZOLE SODIUM 40 MG: 40 INJECTION, POWDER, FOR SOLUTION INTRAVENOUS at 08:16

## 2022-02-13 RX ADMIN — POTASSIUM BICARBONATE 10 MEQ: 782 TABLET, EFFERVESCENT ORAL at 22:43

## 2022-02-13 RX ADMIN — ALBUTEROL SULFATE 2 PUFF: 90 AEROSOL, METERED RESPIRATORY (INHALATION) at 12:10

## 2022-02-13 RX ADMIN — PANTOPRAZOLE SODIUM 40 MG: 40 INJECTION, POWDER, FOR SOLUTION INTRAVENOUS at 22:42

## 2022-02-13 RX ADMIN — CEFEPIME HYDROCHLORIDE 2000 MG: 2 INJECTION, POWDER, FOR SOLUTION INTRAVENOUS at 04:25

## 2022-02-13 RX ADMIN — IPRATROPIUM BROMIDE 2 PUFF: 17 AEROSOL, METERED RESPIRATORY (INHALATION) at 16:49

## 2022-02-13 RX ADMIN — IPRATROPIUM BROMIDE 2 PUFF: 17 AEROSOL, METERED RESPIRATORY (INHALATION) at 12:11

## 2022-02-13 RX ADMIN — SODIUM CHLORIDE, PRESERVATIVE FREE 10 ML: 5 INJECTION INTRAVENOUS at 08:17

## 2022-02-13 RX ADMIN — Medication 2000 UNITS: at 08:15

## 2022-02-13 RX ADMIN — VANCOMYCIN HYDROCHLORIDE 1000 MG: 1 INJECTION, POWDER, LYOPHILIZED, FOR SOLUTION INTRAVENOUS at 18:47

## 2022-02-13 RX ADMIN — OXYCODONE HYDROCHLORIDE 2.5 MG: 5 TABLET ORAL at 22:43

## 2022-02-13 ASSESSMENT — PAIN SCALES - WONG BAKER
WONGBAKER_NUMERICALRESPONSE: 0
WONGBAKER_NUMERICALRESPONSE: 0

## 2022-02-13 ASSESSMENT — PAIN SCALES - GENERAL
PAINLEVEL_OUTOF10: 0
PAINLEVEL_OUTOF10: 10
PAINLEVEL_OUTOF10: 0
PAINLEVEL_OUTOF10: 0

## 2022-02-13 NOTE — PLAN OF CARE
Eye Problem(s):visual blurring occasional  ENT Problem(s):deafness  Cardiovascular problem(s):negative  Respiratory problem(s):negative  Gastro-intestinal problem(s):constipation occasional  Genito-urinary problem(s):negative  Musculoskeletal problem(s):negative  Integumentary problem(s):negative  Neurological problem(s):negative  Psychiatric problem(s):sleeping issue. Goes to bed and up in 1 1/2 hours.   Endocrine problem(s):negative  Hematologic and/or Lymphatic problem(s):negative    QuitLine book marker given to patient.    Problem: Airway Clearance - Ineffective  Goal: Achieve or maintain patent airway  Outcome: Ongoing     Problem: Gas Exchange - Impaired  Goal: Absence of hypoxia  Outcome: Ongoing  Goal: Promote optimal lung function  Outcome: Ongoing     Problem: Breathing Pattern - Ineffective  Goal: Ability to achieve and maintain a regular respiratory rate  Outcome: Ongoing     Problem:  Body Temperature -  Risk of, Imbalanced  Goal: Ability to maintain a body temperature within defined limits  Outcome: Ongoing  Goal: Will regain or maintain usual level of consciousness  Outcome: Ongoing  Goal: Complications related to the disease process, condition or treatment will be avoided or minimized  Outcome: Ongoing     Problem: Isolation Precautions - Risk of Spread of Infection  Goal: Prevent transmission of infection  Outcome: Ongoing     Problem: Nutrition Deficits  Goal: Optimize nutritional status  Outcome: Ongoing     Problem: Risk for Fluid Volume Deficit  Goal: Maintain normal heart rhythm  Outcome: Ongoing  Goal: Maintain absence of muscle cramping  Outcome: Ongoing  Goal: Maintain normal serum potassium, sodium, calcium, phosphorus, and pH  Outcome: Ongoing     Problem: Loneliness or Risk for Loneliness  Goal: Demonstrate positive use of time alone when socialization is not possible  Outcome: Ongoing     Problem: Fatigue  Goal: Verbalize increase energy and improved vitality  Outcome: Ongoing     Problem: Patient Education: Go to Patient Education Activity  Goal: Patient/Family Education  Outcome: Ongoing     Problem: Falls - Risk of:  Goal: Will remain free from falls  Description: Will remain free from falls  Outcome: Ongoing  Goal: Absence of physical injury  Description: Absence of physical injury  Outcome: Ongoing     Problem: Skin Integrity:  Goal: Will show no infection signs and symptoms  Description: Will show no infection signs and symptoms  Outcome: Ongoing  Goal: Absence of new skin breakdown  Description: Absence of new skin breakdown  Outcome: Ongoing     Problem: Pain:  Description: Pain management should include both nonpharmacologic and pharmacologic interventions.   Goal: Pain level will decrease  Description: Pain level will decrease  Outcome: Ongoing  Goal: Control of acute pain  Description: Control of acute pain  Outcome: Ongoing  Goal: Control of chronic pain  Description: Control of chronic pain  Outcome: Ongoing     Problem: Nutrition  Goal: Optimal nutrition therapy  Outcome: Ongoing

## 2022-02-13 NOTE — PROGRESS NOTES
Hospitalist Progress Note      Name:  Severiano Speller /Age/Sex: 1945  (64 y.o. male)   MRN & CSN:  [id number] & [id number] Admission Date/Time: 2022  1:32 PM   Location:  [facility] [place] PCP: Melina Carrero MD         Hospital Day: 17    Assessment and Plan:   Patient is 68-year-old male past medical history of chronic pain syndrome, chronic oxycodone use, diabetes, hypertension, hyperlipidemia who was admitted for confusion, generalized weakness. Patient was found to have COVID-19 pneumonia. Patient continues to require oxygen but is weaning; no improvement in mental status so Neurology now following     Acute hypoxic respiratory failure due to viral pneumonia from COVID-19  Viral pneumonia from COVID-19  Bacterial PNA with sepsis  - On admission,patient's oxygen requirement slowly worsened to 15 L but has slowly improved, currently on 4L NC  -Continue bronchodilators   -Continue dexamethasone  -Completed baricitinib  -Completed zithromax  -Seen by ID  and started on vanc and cefepime given elevated CRP/procal suspicious for bacterial infection.  -Follow blood cultures (NGTD); MRSA screen negative  - Appreciate Pulmonology and ID recs     Encephalopathy, likely metabolic  -Suspect metabolic encephalopathy but no improvement despite above treatments; Neurology currently following and MRI pending  - Holding CNS depressant meds including Neurontin, Tegretol and Roxicodone.  - CT head 22 neg. - Will await MRI     Acute kidney injury likely due to ATN likely due to prerenal azotemia  Metabolic acidosis. - Nephrology following  - Awaiting AM labs     Hypernatremia   - Resolved     Hypophosphatemia/hypokalemia  - Replaced. Both normal.     Sinus tachycardia/junctional tachycardia  - Cardiology following  - Continue metoprolol,bp soft so decrease from 50 mg twice daily to 25 mg bid and monitor. .  Improved.      Generalized weakness and physical deconditioning  - PT/OT following     Other medical problems include hypertension, hyperlipidemia, DM with hyperglycemia  Continue current medications     Poor oral intake  - On TPN but may require PEG if cannot eat     Hyperglycemia  - Endocrinology following.  - On low sliding scale    Reason for continued hospitalization: Encephalopathy resolution; awaiting MRI       Diet ADULT ORAL NUTRITION SUPPLEMENT; Breakfast, Lunch; Fortified Pudding Oral Supplement  ADULT ORAL NUTRITION SUPPLEMENT; Lunch, Dinner; Frozen Oral Supplement  ADULT DIET; Dysphagia - Pureed; Mildly Thick (Nectar)  PN-Adult Premix 4.25/10 - Peripheral Line  PN-Adult Premix 4.25/10 - Peripheral Line   DVT Prophylaxis [] Lovenox, []  Heparin, [] SCDs, [] Ambulation   GI Prophylaxis [] PPI,  [] H2 Blocker,  [] Carafate,  [] Diet/Tube Feeds   Code Status Full Code   Disposition Patient requires continued admission due to    MDM [] Low, [] Moderate,[]  High  Patient's risk as above due to      History of Present Illness:     Remains Encephalopathic; no response to questions this AM; no fevers recorded overnight; d/w wife at bedside currently awaiting MRI    Objective:   No intake or output data in the 24 hours ending 02/13/22 1124   Vitals:   Vitals:    02/13/22 0812   BP:    Pulse: 78   Resp:    Temp:    SpO2:      Physical Exam:   GEN Awake male, sitting upright in bed in no apparent distress. Appears given age. NECK Supple, no apparent thyromegaly or masses. RESP Clear to auscultation, no wheezes, rales or rhonchi. Symmetric chest movement while on 4L NC  CARDIO/VASC S1/S2 auscultated. Regular rate without appreciable murmurs, rubs, or gallops  GI Abdomen is soft without significant tenderness, masses, or guarding  MSK No gross joint deformities. SKIN Normal coloration, warm, dry.   PSYCH Awake but lethargic     Medications:   Medications:    insulin glargine  5 Units SubCUTAneous Nightly    metoprolol tartrate  25 mg Oral BID    insulin lispro  5 Units SubCUTAneous TID WC    [START ON 2/14/2022] fat emulsion  250 mL IntraVENous Once per day on Mon Tue Thu Fri    dexamethasone  6 mg IntraVENous Q24H    vancomycin  1,000 mg IntraVENous Q24H    insulin lispro  0-12 Units SubCUTAneous TID     insulin lispro  0-12 Units SubCUTAneous 2 times per day    oxyCODONE  2.5 mg Oral BID    LORazepam  0.5 mg Oral Nightly    lidocaine  5 mL IntraDERmal Once    sodium chloride flush  5-40 mL IntraVENous 2 times per day    potassium bicarb-citric acid  10 mEq Oral BID    cefepime  2,000 mg IntraVENous Q12H    albuterol sulfate HFA  2 puff Inhalation 4x daily    ipratropium  2 puff Inhalation 4x daily    enoxaparin  30 mg SubCUTAneous Daily    pantoprazole  40 mg IntraVENous BID    [Held by provider] gabapentin  300 mg Oral TID    [Held by provider] carBAMazepine  100 mg Oral BID    sodium chloride flush  5-40 mL IntraVENous 2 times per day    Vitamin D  2,000 Units Oral Daily    aspirin  81 mg Oral Daily    famotidine  10 mg Oral Q48H      Infusions:    PN-Adult Premix 4.25/10 - Peripheral Line      dextrose 5 % and 0.9 % NaCl 50 mL/hr at 02/12/22 1650    PN-Adult Premix 4.25/10 - Peripheral Line 42 mL/hr at 02/12/22 1841    sodium chloride      dextrose       PRN Meds: sodium chloride flush, 5-40 mL, PRN  sodium chloride, 25 mL, PRN  glucose, 15 g, PRN  glucagon (rDNA), 1 mg, PRN  dextrose, 100 mL/hr, PRN  dextrose bolus (hypoglycemia), 125 mL, PRN   Or  dextrose bolus (hypoglycemia), 250 mL, PRN  sodium chloride flush, 5-40 mL, PRN  ondansetron, 4 mg, Q8H PRN   Or  ondansetron, 4 mg, Q6H PRN  polyethylene glycol, 17 g, Daily PRN  acetaminophen, 650 mg, Q6H PRN   Or  acetaminophen, 650 mg, Q6H PRN          Electronically signed by Edgra Bob MD on 2/13/2022 at 11:24 AM

## 2022-02-13 NOTE — PROGRESS NOTES
Nephrology Progress Note  2/13/2022 9:03 AM  Subjective:      Interval History: Ricardo Grayson is a 64 y.o. male weak still lying in bed wife at bedside and says that he is actually doing better currently on TPN and acidosis improved  Data:   Scheduled Meds:   insulin glargine  5 Units SubCUTAneous Nightly    metoprolol tartrate  25 mg Oral BID    insulin lispro  5 Units SubCUTAneous TID     [START ON 2/14/2022] fat emulsion  250 mL IntraVENous Once per day on Mon Tue Thu Fri    dexamethasone  6 mg IntraVENous Q24H    vancomycin  1,000 mg IntraVENous Q24H    insulin lispro  0-12 Units SubCUTAneous TID     insulin lispro  0-12 Units SubCUTAneous 2 times per day    oxyCODONE  2.5 mg Oral BID    LORazepam  0.5 mg Oral Nightly    lidocaine  5 mL IntraDERmal Once    sodium chloride flush  5-40 mL IntraVENous 2 times per day    potassium bicarb-citric acid  10 mEq Oral BID    cefepime  2,000 mg IntraVENous Q12H    albuterol sulfate HFA  2 puff Inhalation 4x daily    ipratropium  2 puff Inhalation 4x daily    enoxaparin  30 mg SubCUTAneous Daily    pantoprazole  40 mg IntraVENous BID    [Held by provider] gabapentin  300 mg Oral TID    [Held by provider] carBAMazepine  100 mg Oral BID    sodium chloride flush  5-40 mL IntraVENous 2 times per day    Vitamin D  2,000 Units Oral Daily    aspirin  81 mg Oral Daily    famotidine  10 mg Oral Q48H     Continuous Infusions:   dextrose 5 % and 0.9 % NaCl 50 mL/hr at 02/12/22 1650    PN-Adult Premix 4.25/10 - Peripheral Line 42 mL/hr at 02/12/22 1841    sodium chloride      dextrose           CBC   Recent Labs     02/11/22  1116   WBC 12.1*   HGB 11.6*   HCT 36.6*   PLT 97*      BMP   Recent Labs     02/10/22  1213 02/11/22  1116 02/12/22  1420   NA  --  140 142   K  --  3.3* 3.9   CL  --  107 105   CO2  --  18* 26   PHOS 1.6* 1.8* 3.1   BUN  --  53* 40*   CREATININE  --  1.7* 1.6*     Hepatic:   No results for input(s): AST, ALT, ALB, BILITOT, ALKPHOS in the last 72 hours. Troponin: No results for input(s): TROPONINI in the last 72 hours. BNP: No results for input(s): BNP in the last 72 hours. Lipids: No results for input(s): CHOL, HDL in the last 72 hours. Invalid input(s): LDLCALCU  ABGs:   Lab Results   Component Value Date    PO2ART 121 02/09/2022    GOA5QXA 25.0 02/09/2022     INR:   No results for input(s): INR in the last 72 hours. Renal Labs  Albumin:    Lab Results   Component Value Date    LABALBU 2.7 02/07/2022    LABALBU 100 05/30/2012     Calcium:    Lab Results   Component Value Date    CALCIUM 7.4 02/12/2022     Phosphorus:    Lab Results   Component Value Date    PHOS 3.1 02/12/2022     U/A:    Lab Results   Component Value Date    NITRU NEGATIVE 02/04/2022    COLORU YELLOW 02/04/2022    WBCUA 2 02/04/2022    RBCUA <1 02/04/2022    MUCUS RARE 01/31/2022    BACTERIA RARE 02/04/2022    CLARITYU CLEAR 02/04/2022    SPECGRAV 1.020 02/04/2022    UROBILINOGEN 0.2 02/04/2022    BILIRUBINUR NEGATIVE 02/04/2022    BLOODU SMALL 02/04/2022    KETUA NEGATIVE 02/04/2022     ABG:    Lab Results   Component Value Date    JVC9SAO 25.0 02/09/2022    PO2ART 121 02/09/2022    ORK3NPX 15.1 02/09/2022     HgBA1c:    Lab Results   Component Value Date    LABA1C 6.0 01/29/2022     Microalbumen/Creatinine ratio:  No components found for: RUCREAT  TSH:  No results found for: TSH  IRON:  No results found for: IRON  Iron Saturation:  No components found for: PERCENTFE  TIBC:  No results found for: TIBC  FERRITIN:    Lab Results   Component Value Date    FERRITIN 2,498 01/28/2022     RPR:  No results found for: RPR  JEREMIE:  No results found for: ANATITER, JEREMIE  24 Hour Urine for Creatinine Clearance:  No components found for: CREAT4, UHRS10, UTV10      Objective:   I/O: No intake/output data recorded. No intake/output data recorded. No intake/output data recorded.   Vitals: /68   Pulse 78   Temp 98.2 °F (36.8 °C) (Oral)   Resp 17   Ht 6' (1.829 m) Wt 162 lb 11.2 oz (73.8 kg)   SpO2 95%   BMI 22.07 kg/m²  {  General appearance: Weak tired  HEENT: Head: Normal, normocephalic, atraumatic.   Neck: supple, symmetrical, trachea midline  Lungs: diminished breath sounds bilaterally  Heart: S1, S2 normal  Abdomen: abnormal findings:  soft nt  Extremities: edema trace  Neurologic: Mental status: alertness: Sleepy        Assessment and Plan:      IMP:  #1 COVID-19 pneumonia  #2 hypernatremia  #3 acute renal failure from ATN on CKD 3 creatinine 1.6  #4 type 2 diabetes  #5 hypertension  #6 weakness with some mild confusion  #7 metabolic acidosis    Plan     #1 treating above setting for COVID  #2 sodium improved  #3 renal function improved creatinine holding at 1.6 now  #4 monitor glucose control stable  #5 blood pressure low stable  #6 monitor affect hard to say to baseline  #7 acidosis resolved off bicarb drip  #8 potassium and phosphorus improved  Evaluate patient do seem to be improved on paper but affect to me still not better  TPN is helping but long-term planning would recommend so PEG tube the patient's wife is hesitant  We will follow in the above setting         Carmela Payne MD, MD

## 2022-02-13 NOTE — CONSULTS
Pharmacy to dose TPN per Dr. Ratna Montenegro   TPN Day #2  (restart, was started 2/8 then stopped on 2/10)    Indication: Poor oral intake    Goal: AA 4.25% + DEX 10% @84 ml/hr  (per dietician)    Central line: none (peripheral line only)    Diet: Dysphagia, pureed, moderately thick     Maintenance Fluids: D5 + 0.9% NACL @50 ml/hr       GLUCOSE LEVELS LAST 72 HOURS                  (last 7 readings)    Recent Labs     02/11/22  1116 02/11/22  1122 02/12/22  0244 02/12/22  0620 02/12/22  1233 02/12/22  1420 02/12/22  1649 02/12/22  2033 02/13/22  0408 02/13/22  0821   POCGLU  --    < > 100* 108* 161*  --  245* 141* 124* 111*   GLUCOSE 217*  --   --   --   --  266*  --   --   --   --     < > = values in this interval not displayed. Glucose:   Goal <180    No readings above goal after TPN with insulin started last night   Scheduled on prandial and Lantus insulin per Dr. Rl Joseph.  Low dose sliding scale insulin coverage per Dr. Rl Joseph   Glucose levels trending down to 111 with the last reading. Will reduce the amount of insulin in the TPN to 40 units per bag. TRIGLYCERIDES:  Triglycerides   Date Value Ref Range Status   02/09/2022 190 (H) <150 MG/DL Final       Triglycerides:   Goal < 400    TG level @190 on 2/9, at goal   Patient is not currently receiving propofol   Continue standard lipid replacement at this time (starting Monday, 2/14/22)      LIVER FUNCTION LAB EVALUATION  No results for input(s): AST, ALT, ALKPHOS, BILITOT, INR in the last 72 hours. Hepatic Function:   Previously WNL   Bilitotal previously <3, will add trace elements to the TPN        TPN LAB EVALUATION  Recent Labs     02/11/22  1116 02/11/22  1116 02/12/22  1420      < > 142   K 3.3*   < > 3.9      < > 105   CALCIUM 7.6*   < > 7.4*   MG 1.8   < > 1.8   PHOS 1.8*   < > 3.1   GLUCOSE 217*  --  266*    < > = values in this interval not displayed.      RENAL LAB EVALUATION  Estimated Creatinine Clearance: 40 mL/min (A) (based on SCr of 1.6 mg/dL (H)). Recent Labs     02/11/22  1116 02/12/22  1420   BUN 53* 40*   CREATININE 1.7* 1.6*     Renal Function and Electrolytes:   Na - WNL yesterday   K - WNL yesterday   Ca - WNL (corrected) yesterday   Mg - WNL yesterday   Phos - WNL yesterday   No new labs have been drawn yet today.  Only calcium gluconate in the current formula   MARCO on CKD3, nephrology following. SCR slightly improved @1.6 yesterday. Nephrology notes acidosis improved. Formulation: Continue same formula AA 4.25 + Dex 10% with custom electrolytes at same rate of 42 ml/hr. Will not advance rate today since no new labs to review. Insulin reduced slightly from 50 units to 40 units due to glucose trends. Standard lipids four times weekly starting Monday. Component Order Dose Admin Dose   CLINIMIX 4.25/10 4.25 % Soln 2,000 mLs 2,000 mL   calcium gluconate 10 % Soln 2,000 mg 2,000 mg   insulin regular 100 UNIT/ML Soln 40 Units 40 Units   adult multi-vitamin with vitamin k Inj 10 mLs 10 mL   trace minerals Cu-Mn-Se-Zn 259-41- MCG/ML Soln 1 mL 1 mL     Pharmacy will continue to follow and adjust as appropriate. Thank you for the consult,    Celso Islas.  Uri Burrows, [address] [facility]  2/13/2022 @ 10:20 AM

## 2022-02-13 NOTE — PROGRESS NOTES
[address] [facility]  consulted by Dr. Tiffany Torres for monitoring and adjustment. Indication for treatment: Pneumonia (HAP)  Goal trough: [] 10-15 mcg/mL or [x] 15-20 mcg/ml  AUC/JOVAN: [] <500 or [x] 400-600    Pertinent Laboratory Values:   Temp Readings from Last 3 Encounters:   02/13/22 98.2 °F (36.8 °C) (Oral)   03/14/17 98.7 °F (37.1 °C) (Oral)   02/19/16 97.7 °F (36.5 °C) (Temporal)     Recent Labs     02/11/22  1116 02/11/22  1541   WBC 12.1*  --    LACTATE  --  2.8*     Recent Labs     02/11/22  1116 02/12/22  1420   BUN 53* 40*   CREATININE 1.7* 1.6*     Estimated Creatinine Clearance: 40 mL/min (A) (based on SCr of 1.6 mg/dL (H)). No intake or output data in the 24 hours ending 02/13/22 1248    Pertinent Cultures:  Date    Source    Results  2/9               Blood                         No growth  2/9               MRSA               Negative    Vancomycin level:   TROUGH:  No results for input(s): VANCOTROUGH in the last 72 hours. RANDOM:    Recent Labs     02/11/22  1116   VANCORANDOM 13.4       Assessment:  · SCr, BUN, and urine output: MARCO on CKD 3, SCr slightly improved @1.6 yesterday, no UOP data  · Day(s) of therapy: # 5  · Vancomycin concentration:   · 2/10:  10.4, Ok to re-dose  · 2/11 - 13.4, 23 hour level post 1250mg dose  · 2/13 - no level done    Plan:  · MARCO on CKD 3, SCR slightly improved @1.6 yesterday, moved to scheduled dosing. · No vanco level or renal labs drawn today. · Continue vancomycin 1000mg ivpb q24h for a predicted AUC of 526 at steady state. · No level done today, rescheduled vanco level for tomorrow am  · Pharmacy will continue to monitor patient and adjust therapy as indicated    Marii 3 2/14 @06:00    Thank you for the consult. Mauricio Simeon [facility], [address] [place] [address]  2/13/2022 12:48 PM

## 2022-02-13 NOTE — PROGRESS NOTES
Neurology Service Progress Note  [facility]  Patient Name: Alexander Canseco  : 1945        Subjective:   Reason for consult: altered mental status  Patient seen and examined this morning. Chart reviewed in detail and discussed with wife present at bedside. Patient was able to participate in exam a little bit better today, able to say yes/no to significant other and answering her questions appropriately. During exam he actually got annoyed with me and pulled the covers up over his head. Still able to assess that he is moving all extremities antigravity/no focal or lateralizing findings. MRI of the brain remains pending. Patient and significant other were given time to ask any questions/concerns and addressed them to the best of my ability.       Past Medical History:   Diagnosis Date    DM (diabetes mellitus) ([facility] [address]     Dysphagia     Dysphonia     Hyperlipidemia     Hypertension     Kidney failure     Osteoarthritis     :   Past Surgical History:   Procedure Laterality Date    CERVICAL SPINE SURGERY      3 years ago    ENDOSCOPY, COLON, DIAGNOSTIC  2016    mild inflammation at the squamocolumnar junction- biopsied, no hiatal hernia, diffuse gastric erythema- biopsies to rule out chronic gastritis or intestinal metaplasia     Medications:  Scheduled Meds:   insulin glargine  5 Units SubCUTAneous Nightly    metoprolol tartrate  25 mg Oral BID    insulin lispro  5 Units SubCUTAneous TID WC    [START ON 2022] fat emulsion  250 mL IntraVENous Once per day on     dexamethasone  6 mg IntraVENous Q24H    vancomycin  1,000 mg IntraVENous Q24H    insulin lispro  0-12 Units SubCUTAneous TID WC    insulin lispro  0-12 Units SubCUTAneous 2 times per day    oxyCODONE  2.5 mg Oral BID    LORazepam  0.5 mg Oral Nightly    lidocaine  5 mL IntraDERmal Once    sodium chloride flush  5-40 mL IntraVENous 2 times per day    potassium bicarb-citric acid  10 mEq Oral BID    cefepime  2,000 mg IntraVENous Q12H    albuterol sulfate HFA  2 puff Inhalation 4x daily    ipratropium  2 puff Inhalation 4x daily    enoxaparin  30 mg SubCUTAneous Daily    pantoprazole  40 mg IntraVENous BID    [Held by provider] gabapentin  300 mg Oral TID    [Held by provider] carBAMazepine  100 mg Oral BID    sodium chloride flush  5-40 mL IntraVENous 2 times per day    Vitamin D  2,000 Units Oral Daily    aspirin  81 mg Oral Daily    famotidine  10 mg Oral Q48H     Continuous Infusions:   PN-Adult Premix 4.25/10 - Peripheral Line      dextrose 5 % and 0.9 % NaCl 50 mL/hr at 22 1650    PN-Adult Premix 4.25/10 - Peripheral Line 42 mL/hr at 22 1841    sodium chloride      dextrose       PRN Meds:.sodium chloride flush, sodium chloride, glucose, glucagon (rDNA), dextrose, dextrose bolus (hypoglycemia) **OR** dextrose bolus (hypoglycemia), sodium chloride flush, ondansetron **OR** ondansetron, polyethylene glycol, acetaminophen **OR** acetaminophen    No Known Allergies  Social History     Socioeconomic History    Marital status:      Spouse name: Not on file    Number of children: Not on file    Years of education: Not on file    Highest education level: Not on file   Occupational History    Not on file   Tobacco Use    Smoking status: Former Smoker     Years: 50.00     Quit date: 2010     Years since quittin.1    Smokeless tobacco: Not on file   Substance and Sexual Activity    Alcohol use: No    Drug use: No    Sexual activity: Not on file   Other Topics Concern    Not on file   Social History Narrative    Not on file     Social Determinants of Health     Financial Resource Strain:     Difficulty of Paying Living Expenses: Not on file   Food Insecurity:     Worried About Running Out of Food in the Last Year: Not on file    Sharon of Food in the Last Year: Not on file   Transportation Needs:     Lack of Transportation (Medical):  Not on file    Lack of Transportation (Non-Medical):  Not on file   Physical Activity:     Days of Exercise per Week: Not on file    Minutes of Exercise per Session: Not on file   Stress:     Feeling of Stress : Not on file   Social Connections:     Frequency of Communication with Friends and Family: Not on file    Frequency of Social Gatherings with Friends and Family: Not on file    Attends Buddhist Services: Not on file    Active Member of [address] [place] [address] or Organizations: Not on file    Attends Club or Organization Meetings: Not on file    Marital Status: Not on file   Intimate Partner Violence:     Fear of Current or Ex-Partner: Not on file    Emotionally Abused: Not on file    Physically Abused: Not on file    Sexually Abused: Not on file   Housing Stability:     Unable to Pay for Housing in the Last Year: Not on file    Number of [place] in the Last Year: Not on file    Unstable Housing in the Last Year: Not on file      Family History   Problem Relation Age of Onset    Cancer Sister 61        colon cancer    Cancer Father 68        stomach cancer spread to liver       Physical Exam:       Vitals:    02/12/22 2156 02/13/22 0222 02/13/22 0810 02/13/22 0812   BP: 87/64 99/63 104/68    Pulse: 78 78 78 78   Resp: 20 15 17    Temp: 98.2 °F (36.8 °C) 98.3 °F (36.8 °C) 98.2 °F (36.8 °C)    TempSrc: Axillary Axillary Oral    SpO2: 92% 93% 95%    Weight:  162 lb 11.2 oz (73.8 kg)     Height:           Wt Readings from Last 3 Encounters:   02/13/22 162 lb 11.2 oz (73.8 kg)   03/14/17 200 lb (90.7 kg)   02/19/16 200 lb 6.4 oz (90.9 kg)     Temp Readings from Last 3 Encounters:   02/13/22 98.2 °F (36.8 °C) (Oral)   03/14/17 98.7 °F (37.1 °C) (Oral)   02/19/16 97.7 °F (36.5 °C) (Temporal)     BP Readings from Last 3 Encounters:   02/13/22 104/68   03/14/17 (!) 139/91   02/19/16 160/78     Pulse Readings from Last 3 Encounters:   02/13/22 78   03/14/17 82   02/19/16 79        Gen: awake, not able to answer any orientation questions but did participate a little bit more with exam today, NAD, mumbles, severe dysarthric speech pattern follows simple commands intermittently    HEENT: NC/AT, EOMI, tracks me around the room however does blink to threat PERRL, mmm, neck supple, no meningeal signs; Heart: No evidence of clubbing or cyanosis  Lungs: Respirations Unlabored  Ext: no edema,   Psych: Unable to assess  Skin: no rashes or lesions    NEUROLOGIC EXAM:    Mental Status: awake, drowsy, patient slightly more inclined to participate on exam today, able to mumble a few words to significant other mostly yes/no, NAD, follows simple commands intermittently    Cranial Nerve Exam:   CN II-XII: PERRL, no nystagmus, no gaze paresis, face appears symmetrical; hearing intact to conversational tone,    Motor Exam:       Moves extremities spontaneously   Tone and bulk normal   No pronator drift    Deep Tendon Reflexes: 2/4 biceps, triceps, brachioradialis, patellar, and achilles b/l; flexor plantar responses b/l    Sensation: Moves extremities to touch and spontaneously     Coordination/Cerebellum:       Tremors--none      Gait and stance:      Gait: deferred      LABS:        CBC:   Recent Labs     02/11/22  1116   WBC 12.1*   RBC 3.74*   HGB 11.6*   HCT 36.6*   PLT 97*   MCV 97.9     BMP:    Recent Labs     02/12/22  1420      K 3.9      CO2 26   BUN 40*   CREATININE 1.6*   GLUCOSE 266*   CALCIUM 7.4*       IMAGING:    CTH     Impression   No acute intracranial abnormality     MRI head wo: pending    Above imaging reviewed in detail. ASSESSMENT/PLAN:    64 y.o. -male with history of T2DM, HLD, HTN, Kidney Failure presenting [facility] on 01/28/[facility] after EMS was called via the  department in which they were called for a well check. Patient chief complaint on presentation was poor appetite, cough and diarrhea.  Neurology being consulted for altered mental status likely Covid encephalopathy superimposed on decreased cognitive reserve. Physical exam limited, but slightly improved from yesterday at one point pulled the covers over his head to have me stop exam. Motor function/sensation grossly non focal/lateralizing. Plan of care as follows:     1. AMS likely due to acute metabolic encephalopathy secondary to COVID-19 infection superimposed on decreased cognitive reserve. · CT head: see above  · MRI head wo: pending  · Pending lab work but most recently WBC trending down- continue to correct metabolic derangements  · Continue on stroke prevention with daily ASA 81 mg  · PT/OT/ST per their recommendations  · Further recommendation pending neurodiagnostics      Patient discussed with attending physician Dr. Leonor Banks patient significant other bedside    Thank you for allowing us to participate in the care of your patient. If there are any questions regarding evaluation please feel free to contact us.      HUMAIRA Dominguez, 2/13/2022

## 2022-02-13 NOTE — PROGRESS NOTES
Progress Note( Dr. Lita Chapman)    Subjective:   Admit Date: 1/28/2022  PCP: Reinaldo Rojas MD    Admitted For : altered mental state with generalized weakness positive for Covid pneumonia    Consulted For: Better control ol of blood glucose    Interval History:r patient mental condition bit better now     patient appeared to be confused at times  Currently eating better when fed by his wife who is staying with him most of the time  D.C  bicarbonate IV drip she is metabolic acidosis improved  We will discontinue D5 normal saline      Denies any chest pains,   Yes SOB . On  oxygen  Denies nausea or vomiting. Eating much better fed by his wife  No new bowel or bladder symptoms. No intake or output data in the 24 hours ending 02/13/22 1718    DATA    CBC:   Recent Labs     02/11/22  1116   WBC 12.1*   HGB 11.6*   PLT 97*    CMP:  Recent Labs     02/11/22  1116 02/12/22  1420    142   K 3.3* 3.9    105   CO2 18* 26   BUN 53* 40*   CREATININE 1.7* 1.6*   CALCIUM 7.6* 7.4*     Lipids:   Lab Results   Component Value Date    TRIG 190 02/09/2022     Glucose:  Recent Labs     02/13/22  0821 02/13/22  1109 02/13/22  1632   POCGLU 111* 151* 134*     [id number]:  Lab Results   Component Value Date    LABA1C 6.0 01/29/2022     High Sensitivity TSH:   Lab Results   Component Value Date    TSHHS 0.335 02/01/2022     Free T3: No results found for: FT3  Free T4:  Lab Results   Component Value Date    T4FREE 1.28 02/01/2022       CT HEAD WO CONTRAST   Final Result   No acute intracranial abnormality. RECOMMENDATIONS:   Unavailable         XR CHEST PORTABLE   Final Result   Bilateral hazy parenchymal opacities greater on the left compatible with   pneumonia. Slightly diminished inspiratory effort. XR CHEST PORTABLE   Final Result   Left basilar infiltrates concerning for pneumonia.          XR CHEST PORTABLE   Final Result   Mild bibasilar airspace disease, greater on the left, most likely pneumonia. VL DUP LOWER EXTREMITY VENOUS BILATERAL   Final Result   Left anterior tibial vein is not visualized and patient trying to get out of   bed during the exam.      No definite deep venous thrombosis is demonstrated. NM LUNG SCAN PERFUSION ONLY   Final Result   Low Probability for Pulmonary Embolus. CT HEAD WO CONTRAST   Final Result   1. No acute intracranial abnormality. 2. Diffuse parenchymal volume loss with mild-to-moderate chronic white matter   microvascular ischemic changes. 3. Chronic lacunar infarct in the right thalamus. XR CHEST PORTABLE   Final Result   Ill-defined left-greater-than-right bibasilar airspace disease most   consistent with pneumonia.          XR CHEST PORTABLE    (Results Pending)   MRI BRAIN WO CONTRAST    (Results Pending)        Scheduled Medicines   Medications:    lidocaine PF  5 mL IntraDERmal Once    sodium chloride flush  5-40 mL IntraVENous 2 times per day    insulin glargine  5 Units SubCUTAneous Nightly    metoprolol tartrate  25 mg Oral BID    insulin lispro  5 Units SubCUTAneous TID     [START ON 2/14/2022] fat emulsion  250 mL IntraVENous Once per day on Mon Tue Thu Fri    dexamethasone  6 mg IntraVENous Q24H    vancomycin  1,000 mg IntraVENous Q24H    insulin lispro  0-12 Units SubCUTAneous TID     insulin lispro  0-12 Units SubCUTAneous 2 times per day    oxyCODONE  2.5 mg Oral BID    LORazepam  0.5 mg Oral Nightly    lidocaine  5 mL IntraDERmal Once    sodium chloride flush  5-40 mL IntraVENous 2 times per day    potassium bicarb-citric acid  10 mEq Oral BID    cefepime  2,000 mg IntraVENous Q12H    albuterol sulfate HFA  2 puff Inhalation 4x daily    ipratropium  2 puff Inhalation 4x daily    enoxaparin  30 mg SubCUTAneous Daily    pantoprazole  40 mg IntraVENous BID    [Held by provider] gabapentin  300 mg Oral TID    [Held by provider] carBAMazepine  100 mg Oral BID    sodium chloride flush  5-40 mL IntraVENous 2 times per day    Vitamin D  2,000 Units Oral Daily    aspirin  81 mg Oral Daily    famotidine  10 mg Oral Q48H      Infusions:    PN-Adult Premix 4.25/10 - Peripheral Line      sodium chloride      dextrose 5 % and 0.9 % NaCl 50 mL/hr at 02/12/22 1650    PN-Adult Premix 4.25/10 - Peripheral Line 42 mL/hr at 02/12/22 1841    sodium chloride      dextrose           Objective:   Vitals: /68   Pulse 78   Temp 98.2 °F (36.8 °C) (Oral)   Resp 17   Ht 6' (1.829 m)   Wt 162 lb 11.2 oz (73.8 kg)   SpO2 95%   BMI 22.07 kg/m²   General appearance: alert and cooperative with exam feels confused  Neck: no JVD or bruit  Thyroid : Normal lobes   Lungs: Has Vesicular Breath sounds onBiPAP by  Heart:  regular rate and rhythm  Abdomen: soft, non-tender; bowel sounds normal; no masses,  no organomegaly  Musculoskeletal: Normal  Extremities: extremities normal, , no edema  Neurologic:  Awake, alert, oriented to name, place and time. Appears to be confused cranial nerves II-XII are grossly intact. Motor is  intact. Sensory is intact. ,  and gait is abnormal.  And unstable    Assessment:     Patient Active Problem List:     Acute kidney injury ([facility] [address]     Diabetes mellitus type II mild       Bilateral pneumonia possibly Covid related      Possible encephalopathy metabolic versus drug          Plan:     1. Reviewed POC blood glucose . Labs and X ray results   2. Reviewed Current Medicines   3. On meal plus correction bolus Humalog/ Basal Lantus Insulin regime  4. Backup he was placed on D5 normal saline  5. Monitor Blood glucose frequently   6. Modified  the dose of Insulin/ other medicines as needed   7. Will follow     .      Rosie Suresh MD, MD

## 2022-02-14 ENCOUNTER — APPOINTMENT (OUTPATIENT)
Dept: MRI IMAGING | Age: 77
DRG: 177 | End: 2022-02-14
Payer: MEDICARE

## 2022-02-14 LAB
BACTERIA: ABNORMAL /HPF
BILIRUBIN URINE: NEGATIVE MG/DL
BLOOD, URINE: ABNORMAL
CLARITY: ABNORMAL
COLOR: YELLOW
CULTURE: NORMAL
GLUCOSE BLD-MCNC: 125 MG/DL (ref 70–99)
GLUCOSE BLD-MCNC: 129 MG/DL (ref 70–99)
GLUCOSE BLD-MCNC: 193 MG/DL (ref 70–99)
GLUCOSE BLD-MCNC: 239 MG/DL (ref 70–99)
GLUCOSE BLD-MCNC: 283 MG/DL (ref 70–99)
GLUCOSE, URINE: ABNORMAL MG/DL
KETONES, URINE: NEGATIVE MG/DL
LEUKOCYTE ESTERASE, URINE: NEGATIVE
Lab: NORMAL
MUCUS: ABNORMAL HPF
NITRITE URINE, QUANTITATIVE: NEGATIVE
PH, URINE: 6 (ref 5–8)
PROTEIN UA: ABNORMAL MG/DL
RBC URINE: 3 /HPF (ref 0–3)
SPECIFIC GRAVITY UA: 1.02 (ref 1–1.03)
SPECIMEN: NORMAL
SQUAMOUS EPITHELIAL: <1 /HPF
UROBILINOGEN, URINE: 0.2 MG/DL (ref 0.2–1)
WBC UA: 2 /HPF (ref 0–2)

## 2022-02-14 PROCEDURE — 2580000003 HC RX 258: Performed by: INTERNAL MEDICINE

## 2022-02-14 PROCEDURE — 82962 GLUCOSE BLOOD TEST: CPT

## 2022-02-14 PROCEDURE — 80202 ASSAY OF VANCOMYCIN: CPT

## 2022-02-14 PROCEDURE — 1200000000 HC SEMI PRIVATE

## 2022-02-14 PROCEDURE — 81001 URINALYSIS AUTO W/SCOPE: CPT

## 2022-02-14 PROCEDURE — 94664 DEMO&/EVAL PT USE INHALER: CPT

## 2022-02-14 PROCEDURE — 94640 AIRWAY INHALATION TREATMENT: CPT

## 2022-02-14 PROCEDURE — 99232 SBSQ HOSP IP/OBS MODERATE 35: CPT

## 2022-02-14 PROCEDURE — 99233 SBSQ HOSP IP/OBS HIGH 50: CPT | Performed by: INTERNAL MEDICINE

## 2022-02-14 PROCEDURE — 80048 BASIC METABOLIC PNL TOTAL CA: CPT

## 2022-02-14 PROCEDURE — 76937 US GUIDE VASCULAR ACCESS: CPT

## 2022-02-14 PROCEDURE — 6360000002 HC RX W HCPCS: Performed by: HOSPITALIST

## 2022-02-14 PROCEDURE — 2580000003 HC RX 258: Performed by: NURSE PRACTITIONER

## 2022-02-14 PROCEDURE — 84145 PROCALCITONIN (PCT): CPT

## 2022-02-14 PROCEDURE — 2500000003 HC RX 250 WO HCPCS: Performed by: INTERNAL MEDICINE

## 2022-02-14 PROCEDURE — 6370000000 HC RX 637 (ALT 250 FOR IP): Performed by: INTERNAL MEDICINE

## 2022-02-14 PROCEDURE — 6370000000 HC RX 637 (ALT 250 FOR IP): Performed by: HOSPITALIST

## 2022-02-14 PROCEDURE — 2700000000 HC OXYGEN THERAPY PER DAY

## 2022-02-14 PROCEDURE — 6360000002 HC RX W HCPCS: Performed by: INTERNAL MEDICINE

## 2022-02-14 PROCEDURE — C9113 INJ PANTOPRAZOLE SODIUM, VIA: HCPCS | Performed by: HOSPITALIST

## 2022-02-14 PROCEDURE — 2709999900 HC NON-CHARGEABLE SUPPLY

## 2022-02-14 PROCEDURE — 83735 ASSAY OF MAGNESIUM: CPT

## 2022-02-14 PROCEDURE — 94761 N-INVAS EAR/PLS OXIMETRY MLT: CPT

## 2022-02-14 PROCEDURE — 05HD33Z INSERTION OF INFUSION DEVICE INTO RIGHT CEPHALIC VEIN, PERCUTANEOUS APPROACH: ICD-10-PCS | Performed by: FAMILY MEDICINE

## 2022-02-14 PROCEDURE — 36410 VNPNXR 3YR/> PHY/QHP DX/THER: CPT

## 2022-02-14 PROCEDURE — 84100 ASSAY OF PHOSPHORUS: CPT

## 2022-02-14 PROCEDURE — 6370000000 HC RX 637 (ALT 250 FOR IP): Performed by: FAMILY MEDICINE

## 2022-02-14 RX ORDER — MAGNESIUM SULFATE IN WATER 40 MG/ML
2000 INJECTION, SOLUTION INTRAVENOUS ONCE
Status: COMPLETED | OUTPATIENT
Start: 2022-02-14 | End: 2022-02-14

## 2022-02-14 RX ORDER — SODIUM CHLORIDE 0.9 % (FLUSH) 0.9 %
5-40 SYRINGE (ML) INJECTION EVERY 12 HOURS SCHEDULED
Status: DISCONTINUED | OUTPATIENT
Start: 2022-02-14 | End: 2022-02-19 | Stop reason: HOSPADM

## 2022-02-14 RX ORDER — SODIUM CHLORIDE 0.9 % (FLUSH) 0.9 %
5-40 SYRINGE (ML) INJECTION PRN
Status: DISCONTINUED | OUTPATIENT
Start: 2022-02-14 | End: 2022-02-19 | Stop reason: HOSPADM

## 2022-02-14 RX ORDER — SODIUM CHLORIDE 9 MG/ML
25 INJECTION, SOLUTION INTRAVENOUS PRN
Status: DISCONTINUED | OUTPATIENT
Start: 2022-02-14 | End: 2022-02-19 | Stop reason: HOSPADM

## 2022-02-14 RX ADMIN — CEFEPIME HYDROCHLORIDE 2000 MG: 2 INJECTION, POWDER, FOR SOLUTION INTRAVENOUS at 15:12

## 2022-02-14 RX ADMIN — IPRATROPIUM BROMIDE 2 PUFF: 17 AEROSOL, METERED RESPIRATORY (INHALATION) at 16:09

## 2022-02-14 RX ADMIN — IPRATROPIUM BROMIDE 2 PUFF: 17 AEROSOL, METERED RESPIRATORY (INHALATION) at 12:11

## 2022-02-14 RX ADMIN — FAMOTIDINE 10 MG: 20 TABLET, FILM COATED ORAL at 09:42

## 2022-02-14 RX ADMIN — METOPROLOL TARTRATE 25 MG: 25 TABLET, FILM COATED ORAL at 21:39

## 2022-02-14 RX ADMIN — SODIUM PHOSPHATE, MONOBASIC, MONOHYDRATE AND SODIUM PHOSPHATE, DIBASIC, ANHYDROUS 15 MMOL: 276; 142 INJECTION, SOLUTION INTRAVENOUS at 16:13

## 2022-02-14 RX ADMIN — MAGNESIUM SULFATE HEPTAHYDRATE 2000 MG: 40 INJECTION, SOLUTION INTRAVENOUS at 11:33

## 2022-02-14 RX ADMIN — SODIUM CHLORIDE 25 ML: 9 INJECTION, SOLUTION INTRAVENOUS at 11:32

## 2022-02-14 RX ADMIN — SODIUM CHLORIDE, PRESERVATIVE FREE 10 ML: 5 INJECTION INTRAVENOUS at 09:43

## 2022-02-14 RX ADMIN — SODIUM CHLORIDE 25 ML: 9 INJECTION, SOLUTION INTRAVENOUS at 16:13

## 2022-02-14 RX ADMIN — CALCIUM GLUCONATE: 98 INJECTION, SOLUTION INTRAVENOUS at 09:54

## 2022-02-14 RX ADMIN — PANTOPRAZOLE SODIUM 40 MG: 40 INJECTION, POWDER, FOR SOLUTION INTRAVENOUS at 09:43

## 2022-02-14 RX ADMIN — DEXAMETHASONE SODIUM PHOSPHATE 6 MG: 4 INJECTION, SOLUTION INTRAMUSCULAR; INTRAVENOUS at 09:43

## 2022-02-14 RX ADMIN — ASPIRIN 81 MG CHEWABLE TABLET 81 MG: 81 TABLET CHEWABLE at 09:43

## 2022-02-14 RX ADMIN — ALBUTEROL SULFATE 2 PUFF: 90 AEROSOL, METERED RESPIRATORY (INHALATION) at 16:08

## 2022-02-14 RX ADMIN — CEFEPIME HYDROCHLORIDE 2000 MG: 2 INJECTION, POWDER, FOR SOLUTION INTRAVENOUS at 04:17

## 2022-02-14 RX ADMIN — POTASSIUM BICARBONATE 10 MEQ: 782 TABLET, EFFERVESCENT ORAL at 09:42

## 2022-02-14 RX ADMIN — I.V. FAT EMULSION 250 ML: 20 EMULSION INTRAVENOUS at 18:41

## 2022-02-14 RX ADMIN — SODIUM CHLORIDE, PRESERVATIVE FREE 10 ML: 5 INJECTION INTRAVENOUS at 09:44

## 2022-02-14 RX ADMIN — CALCIUM GLUCONATE: 98 INJECTION, SOLUTION INTRAVENOUS at 18:41

## 2022-02-14 RX ADMIN — ALBUTEROL SULFATE 2 PUFF: 90 AEROSOL, METERED RESPIRATORY (INHALATION) at 12:10

## 2022-02-14 RX ADMIN — Medication 2000 UNITS: at 09:42

## 2022-02-14 RX ADMIN — ALBUTEROL SULFATE 2 PUFF: 90 AEROSOL, METERED RESPIRATORY (INHALATION) at 08:59

## 2022-02-14 RX ADMIN — OXYCODONE HYDROCHLORIDE 2.5 MG: 5 TABLET ORAL at 09:43

## 2022-02-14 RX ADMIN — POTASSIUM BICARBONATE 10 MEQ: 782 TABLET, EFFERVESCENT ORAL at 21:49

## 2022-02-14 RX ADMIN — LORAZEPAM 0.5 MG: 0.5 TABLET ORAL at 21:39

## 2022-02-14 RX ADMIN — ENOXAPARIN SODIUM 30 MG: 100 INJECTION SUBCUTANEOUS at 09:43

## 2022-02-14 RX ADMIN — OXYCODONE HYDROCHLORIDE 2.5 MG: 5 TABLET ORAL at 21:38

## 2022-02-14 RX ADMIN — METOPROLOL TARTRATE 25 MG: 25 TABLET, FILM COATED ORAL at 09:43

## 2022-02-14 RX ADMIN — SODIUM CHLORIDE 25 ML: 9 INJECTION, SOLUTION INTRAVENOUS at 15:11

## 2022-02-14 RX ADMIN — IPRATROPIUM BROMIDE 2 PUFF: 17 AEROSOL, METERED RESPIRATORY (INHALATION) at 08:58

## 2022-02-14 ASSESSMENT — PAIN DESCRIPTION - LOCATION: LOCATION: BACK

## 2022-02-14 ASSESSMENT — PAIN SCALES - GENERAL
PAINLEVEL_OUTOF10: 0
PAINLEVEL_OUTOF10: 2
PAINLEVEL_OUTOF10: 3
PAINLEVEL_OUTOF10: 0

## 2022-02-14 ASSESSMENT — PAIN SCALES - WONG BAKER
WONGBAKER_NUMERICALRESPONSE: 0
WONGBAKER_NUMERICALRESPONSE: 0

## 2022-02-14 ASSESSMENT — PAIN DESCRIPTION - PAIN TYPE: TYPE: CHRONIC PAIN

## 2022-02-14 ASSESSMENT — PAIN SCALES - PAIN ASSESSMENT IN ADVANCED DEMENTIA (PAINAD)
NEGVOCALIZATION: 1
BODYLANGUAGE: 0
FACIALEXPRESSION: 1
BREATHING: 0
CONSOLABILITY: 0
TOTALSCORE: 2

## 2022-02-14 NOTE — PROGRESS NOTES
Infectious Disease Progress Note  2022   Patient Name: Ricardo Grayson : 1945       Reason for visit: F/u COVID-19 pneumonia, acute respiratory failure? History:? Interval history noted  More responsive  Physical Exam:  Vital Signs: BP (!) 114/49   Pulse 84   Temp 98.2 °F (36.8 °C) (Oral)   Resp 18   Ht 6' (1.829 m)   Wt 160 lb 11.5 oz (72.9 kg)   SpO2 98%   BMI 21.80 kg/m²     Gen: Awake, delayed responses  Skin: no stigmata of endocarditis  Wounds: C/D/I  HEMT: AT/NC Oropharynx pink, moist, and without lesions or exudates  Eyes: PERRLA, EOMI, conjunctiva pink, sclera anicteric. Neck: Supple. Trachea midline. No LAD. Chest: Reduced air entry bilaterally  Heart: RRR  Abd: soft, non-distended, no tenderness, no hepatomegaly. Normoactive bowel sounds. Ext: no clubbing, cyanosis, or edema  Catheter Site: without erythema or tenderness  LDA:   Neuro: Mental status intact. Radiologic / Imaging / TESTING  No results found.      Labs:    Recent Results (from the past 24 hour(s))   POCT Glucose    Collection Time: 22 11:09 AM   Result Value Ref Range    POC Glucose 151 (H) 70 - 99 MG/DL   POCT Glucose    Collection Time: 22  4:32 PM   Result Value Ref Range    POC Glucose 134 (H) 70 - 99 MG/DL   Magnesium    Collection Time: 22  5:38 PM   Result Value Ref Range    Magnesium 1.6 (L) 1.8 - 2.4 mg/dl   Phosphorus    Collection Time: 22  5:38 PM   Result Value Ref Range    Phosphorus 2.2 (L) 2.5 - 4.9 MG/DL   Basic metabolic panel    Collection Time: 22  5:38 PM   Result Value Ref Range    Sodium 138 135 - 145 MMOL/L    Potassium 3.9 3.5 - 5.1 MMOL/L    Chloride 103 99 - 110 mMol/L    CO2 25 21 - 32 MMOL/L    Anion Gap 10 4 - 16    BUN 36 (H) 6 - 23 MG/DL    CREATININE 1.4 (H) 0.9 - 1.3 MG/DL    Glucose 186 (H) 70 - 99 MG/DL    Calcium 7.8 (L) 8.3 - 10.6 MG/DL    GFR Non- 49 (L) >60 mL/min/1.73m2    GFR  59 (L) >60 mL/min/1.73m2 Vancomycin, trough    Collection Time: 02/13/22  5:38 PM   Result Value Ref Range    Vancomycin Tr 12.8 10 - 20 UG/ML    DOSE AMOUNT DOSE AMT. GIVEN - UNKNOWN     DOSE TIME DOSE TIME GIVEN - UNKNOWN    POCT Glucose    Collection Time: 02/13/22 10:40 PM   Result Value Ref Range    POC Glucose 158 (H) 70 - 99 MG/DL   Urinalysis Reflex to Culture    Collection Time: 02/14/22  1:06 AM    Specimen: Urine   Result Value Ref Range    Color, UA YELLOW (A) YELLOW    Clarity, UA SLIGHTLY HAZY CLEAR    Glucose, Urine 250 MG/DL NEGATIVE MG/DL    Bilirubin Urine NEGATIVE NEGATIVE MG/DL    Ketones, Urine NEGATIVE NEGATIVE MG/DL    Specific Gravity, UA 1.025 1.001 - 1.035    Blood, Urine LARGE NEGATIVE    pH, Urine 6.0 5.0 - 8.0    Protein, UA 30 MG/DL NEGATIVE MG/DL    Urobilinogen, Urine 0.2 0.2 - 1.0 MG/DL    Nitrite Urine, Quantitative NEGATIVE NEGATIVE    Leukocyte Esterase, Urine NEGATIVE NEGATIVE    RBC, UA 3 0 - 3 /HPF    WBC, UA 2 0 - 2 /HPF    Bacteria, UA RARE (A) NEGATIVE /HPF    Squam Epithel, UA <1 /HPF    Mucus, UA RARE (A) NEGATIVE HPF   POCT Glucose    Collection Time: 02/14/22  4:01 AM   Result Value Ref Range    POC Glucose 125 (H) 70 - 99 MG/DL   POCT Glucose    Collection Time: 02/14/22  8:38 AM   Result Value Ref Range    POC Glucose 129 (H) 70 - 99 MG/DL     CULTURE results: Invalid input(s): BLOOD CULTURE,  URINE CULTURE, SURGICAL CULTURE    Diagnosis:  Patient Active Problem List   Diagnosis    Acute kidney injury ([facility] [address]    Pneumonia due to COVID-19 virus    Acute respiratory failure with hypoxia ([facility] [address]       Active Problems  Principal Problem:    Acute kidney injury ([facility] [address]  Active Problems:    Pneumonia due to COVID-19 virus    Acute respiratory failure with hypoxia (HCC)  Resolved Problems:    * No resolved hospital problems. *      Impression and plan   Summary and rationale: Patient is a 64 y.o.   male with a medical history of diabetes mellitus, hypertension, hyperlipidemia, not vaccinated against COVID-19 who was admitted on 01/20/2022 for 2-week history of cough, poor appetite and diarrhea. Diagnosed with severe COVID-19 pneumonia and acute hypoxic respiratory failure. Consulted on 02/09/2022 for worsening status. Elevated procalcitonin and CRP suggestive of cysts secondary bacterial pneumonia. Seen by neurology service, diagnosed with metabolic encephalopathy on reduced cognitive reserve.  MRSA nares negative   COVID-19 symptom onset: 01/21/2022   COVID-19 test date: 01/28/2022   Expected discontinuation of isolation precautions: 02/10/2022   Clinical status: Better clinically, somewhat more responsive, and his oxygen saturation is 97-98% on ambient air.    Therapeutic:  o Ongoing antibiotics:continue cefepime (2/9-), end date 2/15/2022  o Immunomodulators:  - Dexamethasone: Yes  - Baricitinib: Yes  o Completed antibiotics: Azithromycin, Discontinue vancomycin 2/9-12  o Other agents:    Diagnostic: Trend CRP and procalcitonin   F/u:    Other:       Electronically signed by: Electronically signed by Victorino Valladares MD on 2/14/2022 at 9:58 AM

## 2022-02-14 NOTE — PROGRESS NOTES
Nephrology Progress Note  2/14/2022 5:13 PM  Subjective: Interval History: Lam Rosales is a 64 y.o. male weak slightly more awake today  Data:   Scheduled Meds:   sodium chloride flush  5-40 mL IntraVENous 2 times per day    sodium phosphate IVPB  15 mmol IntraVENous Once    lidocaine PF  5 mL IntraDERmal Once    sodium chloride flush  5-40 mL IntraVENous 2 times per day    insulin lispro  0-6 Units SubCUTAneous TID WC    insulin lispro  0-3 Units SubCUTAneous 2 times per day    [Held by provider] insulin glargine  5 Units SubCUTAneous Nightly    metoprolol tartrate  25 mg Oral BID    [Held by provider] insulin lispro  5 Units SubCUTAneous TID WC    fat emulsion  250 mL IntraVENous Once per day on Mon Tue Thu Fri    oxyCODONE  2.5 mg Oral BID    LORazepam  0.5 mg Oral Nightly    lidocaine  5 mL IntraDERmal Once    sodium chloride flush  5-40 mL IntraVENous 2 times per day    potassium bicarb-citric acid  10 mEq Oral BID    cefepime  2,000 mg IntraVENous Q12H    albuterol sulfate HFA  2 puff Inhalation 4x daily    ipratropium  2 puff Inhalation 4x daily    enoxaparin  30 mg SubCUTAneous Daily    pantoprazole  40 mg IntraVENous BID    [Held by provider] gabapentin  300 mg Oral TID    [Held by provider] carBAMazepine  100 mg Oral BID    sodium chloride flush  5-40 mL IntraVENous 2 times per day    Vitamin D  2,000 Units Oral Daily    aspirin  81 mg Oral Daily    famotidine  10 mg Oral Q48H     Continuous Infusions:   sodium chloride 25 mL (02/14/22 1613)    PN-Adult Premix 4.25/10 - Peripheral Line      PN-Adult Premix 4.25/10 - Peripheral Line 42 mL/hr at 02/14/22 0954    sodium chloride      sodium chloride      dextrose           CBC   No results for input(s): WBC, HGB, HCT, PLT in the last 72 hours.    BMP   Recent Labs     02/12/22  1420 02/13/22  1738    138   K 3.9 3.9    103   CO2 26 25   PHOS 3.1 2.2*   BUN 40* 36*   CREATININE 1.6* 1.4*     Hepatic:   No 79   Temp 98 °F (36.7 °C) (Axillary)   Resp 20   Ht 6' (1.829 m)   Wt 160 lb 11.5 oz (72.9 kg)   SpO2 98%   BMI 21.80 kg/m²  {  General appearance: Weak tired  HEENT: Head: Normal, normocephalic, atraumatic.   Neck: supple, symmetrical, trachea midline  Lungs: diminished breath sounds bilaterally  Heart: S1, S2 normal  Abdomen: abnormal findings:  soft nt  Extremities: edema trace  Neurologic: Mental status: alertness: Arousable        Assessment and Plan:      IMP:  #1 COVID-19 pneumonia  #2 hypernatremia  #3 acute renal failure from ATN on CKD 3 creatinine 1.6  #4 type 2 diabetes  #5 hypertension  #6 weakness with some mild confusion  #7 metabolic acidosis    Plan     #1 treated for COVID-19  #2 sodium stable  #3 renal function at baseline  #4 glucose control  #5 blood pressure stable  #6 monitor affect slightly better  #7 improved acidosis  Currently on TPN and likely that is the answer is patient needs better nutrition will maintain a follow-up treatment plan         Kalpesh Camarillo MD, MD

## 2022-02-14 NOTE — PROGRESS NOTES
pulmonary      SUBJECTIVE:  Seen earlier and no sob     OBJECTIVE    VITALS:  BP (!) 114/49   Pulse 84   Temp 98.2 °F (36.8 °C) (Oral)   Resp 18   Ht 6' (1.829 m)   Wt 160 lb 11.5 oz (72.9 kg)   SpO2 98%   BMI 21.80 kg/m²   HEAD AND FACE EXAM:  No throat injection, no active exudate,no thrush  NECK EXAM;No JVD, no masses, symmetrical  CHEST EXAM; Expansion equal and symmetrical, no masses  LUNG EXAM; Good breath sounds bilaterally. There are expiratory wheezes both lungs, there are crackles at both lung bases  CARDIOVASCULAR EXAM: Positive S1 and S2, no S3 or S4, no clicks ,no murmurs  RIGHT AND LEFT LOWER EXTRIMITY EXAM: No edema, no swelling, no inflamation            LABS   Lab Results   Component Value Date    WBC 12.1 (H) 02/11/2022    HGB 11.6 (L) 02/11/2022    HCT 36.6 (L) 02/11/2022    MCV 97.9 02/11/2022    PLT 97 (L) 02/11/2022     Lab Results   Component Value Date    CREATININE 1.4 (H) 02/13/2022    BUN 36 (H) 02/13/2022     02/13/2022    K 3.9 02/13/2022     02/13/2022    CO2 25 02/13/2022     Lab Results   Component Value Date    INR 5.10 (HH) 02/01/2022    PROTIME 66.9 (H) 02/01/2022          Lab Results   Component Value Date    PHOS 2.2 02/13/2022    PHOS 3.1 02/12/2022    PHOS 1.8 02/11/2022      No results for input(s): PH, PO2ART, XST4NJD, HCO3, BEART, O2SAT in the last 72 hours. Wt Readings from Last 3 Encounters:   02/14/22 160 lb 11.5 oz (72.9 kg)   03/14/17 200 lb (90.7 kg)   02/19/16 200 lb 6.4 oz (90.9 kg)               ASSESMENT  Ac resp failure  covid pneumonia   bact pneumonia        PLAN  1. cpm  2.  Cont o2    2/14/2022  Keiry Earl MD, M.D.

## 2022-02-14 NOTE — PROGRESS NOTES
Pharmacy to dose TPN per Dr. Alberto Walker   TPN Day #3  (restart, was started 2/8 then stopped on 2/10)    Indication: Poor oral intake    Goal: AA 4.25% + DEX 10% @84 ml/hr  (per dietician)    Central line: none (peripheral line only)    Diet: Dysphagia, pureed, moderately thick     Maintenance Fluids: none at this time        GLUCOSE LEVELS LAST 72 HOURS                  (last 7 readings)    Recent Labs     02/11/22  1116 02/11/22  1122 02/12/22  1420 02/12/22  1649 02/13/22  0408 02/13/22  0821 02/13/22  1109 02/13/22  1632 02/13/22  1738 02/13/22  2240 02/14/22  0401 02/14/22  0838   POCGLU  --    < >  --    < > 124* 111* 151* 134*  --  158* 125* 129*   GLUCOSE 217*  --  266*  --   --   --   --   --  186*  --   --   --     < > = values in this interval not displayed. Glucose:   Goal <180    No readings above goal after TPN with insulin started last night   Scheduled on prandial and Lantus insulin per Dr. Hillary Paris.  Low dose sliding scale insulin coverage per Dr. Mayelin Cooper with the amount of insulin in the TPN to 40 units per bag. TRIGLYCERIDES:  Triglycerides   Date Value Ref Range Status   02/09/2022 190 (H) <150 MG/DL Final     Triglycerides:   Goal < 400    TG level @190 on 2/9, at goal   Patient is not currently receiving propofol   Continue standard lipid replacement at this time (starting Monday, 2/14/22)      LIVER FUNCTION LAB EVALUATION  No results for input(s): AST, ALT, ALKPHOS, BILITOT, INR in the last 72 hours.   Hepatic Function:   Previously WNL   Bilitotal previously <3, will add trace elements to the TPN        TPN LAB EVALUATION  Recent Labs     02/11/22  1116 02/11/22  1116 02/12/22  1420 02/12/22  1420 02/13/22  1738      < > 142   < > 138   K 3.3*   < > 3.9   < > 3.9      < > 105   < > 103   CALCIUM 7.6*   < > 7.4*   < > 7.8*   MG 1.8   < > 1.8   < > 1.6*   PHOS 1.8*   < > 3.1   < > 2.2*   GLUCOSE 217*  --  266*  --  186*    < > = values in this interval not displayed. RENAL LAB EVALUATION  Estimated Creatinine Clearance: 45 mL/min (A) (based on SCr of 1.4 mg/dL (H)). Recent Labs     02/11/22  1116 02/12/22  1420 02/13/22  1738   BUN 53* 40* 36*   CREATININE 1.7* 1.6* 1.4*     Renal Function and Electrolytes:   Na - WNL    K - WNL    Ca - trending up yesterday   Mg -  Trending down yesterday (2g outside of TPN)    Phos - Trending down yesterday (NaPhos 15 mmol outside of TPN)   No new labs have been drawn yet today.  Only calcium gluconate in the current formula   MARCO on CKD3, nephrology following. SCR slightly improved @1.6 yesterday. Formulation: Continue same formula AA 4.25 + Dex 10% with custom electrolytes at same rate of 42 ml/hr. Will not advance rate today since no new labs to review. Standard lipids four times weekly starting Monday. Component Order Dose Admin Dose   CLINIMIX 4.25/10 4.25 % Soln 2,000 mLs 2,000 mL   calcium gluconate 10 % Soln 2,000 mg 2,000 mg   insulin regular 100 UNIT/ML Soln 40 Units 40 Units   adult multi-vitamin with vitamin k Inj 10 mLs 10 mL   trace minerals Cu-Mn-Se-Zn 660-15- MCG/ML Soln 1 mL 1 mL     Pharmacy will continue to follow and adjust as appropriate.    Thank you for the consult,    Husam Higuera, [id number] [facility]  2/14/2022 @ 10:36 AM

## 2022-02-14 NOTE — CONSULTS
Consult completed. PICC contraindicated r/t pt combativeness/confusion and MARCO-on-CKD3, with Dr. Kishan Ann recommending PEG to meet nutritional needs. Current orders are for peripherally-dosed PN and EDPIV will meet pt's therapeutic needs while minimizing damage to vasculature. #18ga Arrow Endurance Extended Dwell MidLine Catheter initiated to RUE Cephalic Vein just proximal of AC using sterile, UltraSound-guided technique without difficulty/complications. Positioning verified via UltraSound visualization of catheter within vessel lumen; site returns blood briskly and flushes without resistance/abnormalities. Sterile dressing with SkinPrep, StatLock Securing Device, BioPatch, SwabCap, and Limb Precautions band applied. Pt agitated/restless throughout procedure setup and becomes slightly combative during insertion trying to grab/scratch RN but procedure completed without harm to pt or staff.

## 2022-02-14 NOTE — PROGRESS NOTES
Hospitalist Progress Note      Name:  Eryn Cavanaugh /Age/Sex: 1945  (64 y.o. male)   MRN & CSN:  [id number] & [id number] Admission Date/Time: 2022  1:32 PM   [place]  [facility] [place] [place] PCP: Elissa Adams MD         Hospital Day: 18    Assessment and Plan:   Patient is 77-year-old male past medical history of chronic pain syndrome, chronic oxycodone use, diabetes, hypertension, hyperlipidemia who was admitted for confusion, generalized weakness. Patient was found to have COVID-19 pneumonia. Patient continues to require oxygen but is weaning; no improvement in mental status so Neurology now following     Acute hypoxic respiratory failure due to viral pneumonia from COVID-19  Viral pneumonia from COVID-19  Bacterial PNA with sepsis  - On admission,patient's oxygen requirement slowly worsened to 15 L but has slowly improved, currently on 4L NC  -Continue bronchodilators   -Continue dexamethasone  -Completed baricitinib  -Completed zithromax  -Seen by ID  and started on vanc and cefepime given elevated CRP/procal suspicious for bacterial infection.  -Follow blood cultures (NGTD); MRSA screen negative  - Appreciate Pulmonology and ID recs     Encephalopathy, likely metabolic  -Suspect metabolic encephalopathy and is improving daily albeit slowly; Neurology currently following and MRI pending  - Holding CNS depressant meds including Neurontin, Tegretol and Roxicodone.  - CT head 22 neg  - Will await MRI     Acute kidney injury likely due to ATN likely due to prerenal azotemia  Metabolic acidosis. - Nephrology following  - Awaiting AM labs     Hypernatremia   - Resolved     Hypophosphatemia/hypokalemia  - Replaced. Both normal.     Sinus tachycardia/junctional tachycardia  - Cardiology following  - Continue metoprolol,bp soft so decrease from 50 mg twice daily to 25 mg bid and monitor. .  Improved.      Generalized weakness and physical deconditioning  - PT/OT following    Poor oral intake  - On TPN via PPN  - Family declined PICC and patient was too combative and was unable to be placed  - Wife also no hesitant on PEG although patient is eating more per bedside nurse this morning so will monitor and encourage PO intake with bedside feeder     Hyperglycemia  History of Type II DM  - Endocrinology following.  - On low sliding scale    Hypertension  - Home meds; adjust as needed    Reason for continued hospitalization: Encephalopathy resolution (improving daily); awaiting MRI       Diet ADULT ORAL NUTRITION SUPPLEMENT; Breakfast, Lunch; Fortified Pudding Oral Supplement  ADULT ORAL NUTRITION SUPPLEMENT; Lunch, Dinner; Frozen Oral Supplement  ADULT DIET; Dysphagia - Pureed; Mildly Thick (Nectar)  PN-Adult Premix 4.25/10 - Peripheral Line   DVT Prophylaxis [] Lovenox, []  Heparin, [] SCDs, [] Ambulation   GI Prophylaxis [] PPI,  [] H2 Blocker,  [] Carafate,  [] Diet/Tube Feeds   Code Status Full Code   Disposition Patient requires continued admission due to    MDM [] Low, [] Moderate,[]  High  Patient's risk as above due to      History of Present Illness:     Encephalopathy improving daily; improved today compared to yesterday; is taking PO with bedside feeder this AM; no fevers overnight; still awaiting MRI    Objective: Intake/Output Summary (Last 24 hours) at 2/14/2022 1026  Last data filed at 2/14/2022 0925  Gross per 24 hour   Intake 120 ml   Output --   Net 120 ml      Vitals:   Vitals:    02/14/22 0739   BP: (!) 114/49   Pulse: 84   Resp: 18   Temp: 98.2 °F (36.8 °C)   SpO2: 98%     Physical Exam:   GEN Awake male, sitting upright in bed in no apparent distress. Appears given age. NECK Supple, no apparent thyromegaly or masses. RESP Clear to auscultation, no wheezes, rales or rhonchi. Symmetric chest movement while on 4L NC  CARDIO/VASC S1/S2 auscultated.  Regular rate without appreciable murmurs, rubs, or gallops  GI Abdomen is soft without significant tenderness, masses, or guarding  MSK No gross joint deformities. SKIN Normal coloration, warm, dry.   PSYCH Awake but lethargic     Medications:   Medications:    sodium chloride flush  5-40 mL IntraVENous 2 times per day    lidocaine PF  5 mL IntraDERmal Once    sodium chloride flush  5-40 mL IntraVENous 2 times per day    insulin lispro  0-6 Units SubCUTAneous TID     insulin lispro  0-3 Units SubCUTAneous 2 times per day    [Held by provider] insulin glargine  5 Units SubCUTAneous Nightly    metoprolol tartrate  25 mg Oral BID    [Held by provider] insulin lispro  5 Units SubCUTAneous TID     fat emulsion  250 mL IntraVENous Once per day on Mon Tue Thu Fri    oxyCODONE  2.5 mg Oral BID    LORazepam  0.5 mg Oral Nightly    lidocaine  5 mL IntraDERmal Once    sodium chloride flush  5-40 mL IntraVENous 2 times per day    potassium bicarb-citric acid  10 mEq Oral BID    cefepime  2,000 mg IntraVENous Q12H    albuterol sulfate HFA  2 puff Inhalation 4x daily    ipratropium  2 puff Inhalation 4x daily    enoxaparin  30 mg SubCUTAneous Daily    pantoprazole  40 mg IntraVENous BID    [Held by provider] gabapentin  300 mg Oral TID    [Held by provider] carBAMazepine  100 mg Oral BID    sodium chloride flush  5-40 mL IntraVENous 2 times per day    Vitamin D  2,000 Units Oral Daily    aspirin  81 mg Oral Daily    famotidine  10 mg Oral Q48H      Infusions:    sodium chloride      PN-Adult Premix 4.25/10 - Peripheral Line 42 mL/hr at 02/14/22 0954    sodium chloride      sodium chloride      dextrose       PRN Meds: sodium chloride flush, 5-40 mL, PRN  sodium chloride, 25 mL, PRN  sodium chloride flush, 5-40 mL, PRN  sodium chloride, 25 mL, PRN  sodium chloride flush, 5-40 mL, PRN  sodium chloride, 25 mL, PRN  glucose, 15 g, PRN  glucagon (rDNA), 1 mg, PRN  dextrose, 100 mL/hr, PRN  dextrose bolus (hypoglycemia), 125 mL, PRN   Or  dextrose bolus (hypoglycemia), 250 mL, PRN  sodium chloride flush, 5-40 mL, PRN  ondansetron, 4 mg,

## 2022-02-14 NOTE — PROGRESS NOTES
Neurology Service Progress Note  [facility]  Patient Name: Alexander Canseco  : 1945        Subjective:   Reason for consult: altered mental status  Patient seen and examined this morning. Chart reviewed in detail. Patient more awake then last encounter. Patient able to say his name, wife's name, following simple commands.      Past Medical History:   Diagnosis Date    DM (diabetes mellitus) ([address]     Dysphagia     Dysphonia     Hyperlipidemia     Hypertension     Kidney failure     Osteoarthritis     :   Past Surgical History:   Procedure Laterality Date    CERVICAL SPINE SURGERY      3 years ago    ENDOSCOPY, COLON, DIAGNOSTIC  2016    mild inflammation at the squamocolumnar junction- biopsied, no hiatal hernia, diffuse gastric erythema- biopsies to rule out chronic gastritis or intestinal metaplasia     Medications:  Scheduled Meds:   sodium chloride flush  5-40 mL IntraVENous 2 times per day    magnesium sulfate  2,000 mg IntraVENous Once    sodium phosphate IVPB  15 mmol IntraVENous Once    lidocaine PF  5 mL IntraDERmal Once    sodium chloride flush  5-40 mL IntraVENous 2 times per day    insulin lispro  0-6 Units SubCUTAneous TID WC    insulin lispro  0-3 Units SubCUTAneous 2 times per day    [Held by provider] insulin glargine  5 Units SubCUTAneous Nightly    metoprolol tartrate  25 mg Oral BID    [Held by provider] insulin lispro  5 Units SubCUTAneous TID WC    fat emulsion  250 mL IntraVENous Once per day on     oxyCODONE  2.5 mg Oral BID    LORazepam  0.5 mg Oral Nightly    lidocaine  5 mL IntraDERmal Once    sodium chloride flush  5-40 mL IntraVENous 2 times per day    potassium bicarb-citric acid  10 mEq Oral BID    cefepime  2,000 mg IntraVENous Q12H    albuterol sulfate HFA  2 puff Inhalation 4x daily    ipratropium  2 puff Inhalation 4x daily    enoxaparin  30 mg SubCUTAneous Daily    pantoprazole  40 mg IntraVENous BID  [Held by provider] gabapentin  300 mg Oral TID    [Held by provider] carBAMazepine  100 mg Oral BID    sodium chloride flush  5-40 mL IntraVENous 2 times per day    Vitamin D  2,000 Units Oral Daily    aspirin  81 mg Oral Daily    famotidine  10 mg Oral Q48H     Continuous Infusions:   sodium chloride      PN-Adult Premix 4.25/10 - Peripheral Line      PN-Adult Premix 4.25/10 - Peripheral Line 42 mL/hr at 22 0954    sodium chloride      sodium chloride      dextrose       PRN Meds:.sodium chloride flush, sodium chloride, sodium chloride flush, sodium chloride, sodium chloride flush, sodium chloride, glucose, glucagon (rDNA), dextrose, dextrose bolus (hypoglycemia) **OR** dextrose bolus (hypoglycemia), sodium chloride flush, ondansetron **OR** ondansetron, polyethylene glycol, acetaminophen **OR** acetaminophen    No Known Allergies  Social History     Socioeconomic History    Marital status:      Spouse name: Not on file    Number of children: Not on file    Years of education: Not on file    Highest education level: Not on file   Occupational History    Not on file   Tobacco Use    Smoking status: Former Smoker     Years: 50.00     Quit date: 2010     Years since quittin.1    Smokeless tobacco: Not on file   Substance and Sexual Activity    Alcohol use: No    Drug use: No    Sexual activity: Not on file   Other Topics Concern    Not on file   Social History Narrative    Not on file     Social Determinants of Health     Financial Resource Strain:     Difficulty of Paying Living Expenses: Not on file   Food Insecurity:     Worried About Running Out of Food in the Last Year: Not on file    Sharon of Food in the Last Year: Not on file   Transportation Needs:     Lack of Transportation (Medical): Not on file    Lack of Transportation (Non-Medical):  Not on file   Physical Activity:     Days of Exercise per Week: Not on file    Minutes of Exercise per Session: Not on file   Stress:     Feeling of Stress : Not on file   Social Connections:     Frequency of Communication with Friends and Family: Not on file    Frequency of Social Gatherings with Friends and Family: Not on file    Attends Hinduism Services: Not on file    Active Member of [address] [place] [address] [employer] or Organizations: Not on file    Attends Club or Organization Meetings: Not on file    Marital Status: Not on file   Intimate Partner Violence:     Fear of Current or Ex-Partner: Not on file    Emotionally Abused: Not on file    Physically Abused: Not on file    Sexually Abused: Not on file   Housing Stability:     Unable to Pay for Housing in the Last Year: Not on file    Number of [place] in the Last Year: Not on file    Unstable Housing in the Last Year: Not on file      Family History   Problem Relation Age of Onset    Cancer Sister 61        colon cancer    Cancer Father 68        stomach cancer spread to liver       Physical Exam:       Vitals:    02/13/22 1210 02/13/22 2235 02/14/22 0345 02/14/22 0739   BP:  94/60 94/65 (!) 114/49   Pulse:  78 86 84   Resp: 20  16 18   Temp:  98.2 °F (36.8 °C) 97.8 °F (36.6 °C) 98.2 °F (36.8 °C)   TempSrc:   Axillary Oral   SpO2: 100% 96% 93% 98%   Weight:   160 lb 11.5 oz (72.9 kg)    Height:           Wt Readings from Last 3 Encounters:   02/14/22 160 lb 11.5 oz (72.9 kg)   03/14/17 200 lb (90.7 kg)   02/19/16 200 lb 6.4 oz (90.9 kg)     Temp Readings from Last 3 Encounters:   02/14/22 98.2 °F (36.8 °C) (Oral)   03/14/17 98.7 °F (37.1 °C) (Oral)   02/19/16 97.7 °F (36.5 °C) (Temporal)     BP Readings from Last 3 Encounters:   02/14/22 (!) 114/49   03/14/17 (!) 139/91   02/19/16 160/78     Pulse Readings from Last 3 Encounters:   02/14/22 84   03/14/17 82   02/19/16 79        Gen: awake,difficult to exam orientation as patient does not answer orientation questions other than name.  NAD, speech more clear,    HEENT: NC/AT, EOMI, blinks to threat,  PERRL, mmm, neck supple, no meningeal signs; Heart: SR per monitor  Lungs: Respirations Unlabored  Ext: no edema,   Psych: Unable to assess  Skin: no rashes or lesions    NEUROLOGIC EXAM:    Mental Status: difficult to exam orientation as patient does not answer orientation questions other than name. speech is clear, no mumbling during exam today, NAD, follows simple commands intermittently    Cranial Nerve Exam:   CN II-XII: PERRL, no nystagmus, no gaze paresis, face appears symmetrical; hearing intact to conversational tone,    Motor Exam:       Moves extremities spontaneously and to command  Tone and bulk normal   No pronator drift    Deep Tendon Reflexes: 2/4 biceps, triceps, brachioradialis, patellar, and achilles b/l; flexor plantar responses b/l    Sensation: Moves extremities to touch and spontaneously     Coordination/Cerebellum:       Tremors--none      Gait and stance:      Gait: deferred      LABS:        CBC:   Recent Labs     02/11/22  1116   WBC 12.1*   RBC 3.74*   HGB 11.6*   HCT 36.6*   PLT 97*   MCV 97.9     BMP:    Recent Labs     02/13/22  1738      K 3.9      CO2 25   BUN 36*   CREATININE 1.4*   GLUCOSE 186*   CALCIUM 7.8*       IMAGING:    CTH     Impression   No acute intracranial abnormality     MRI head wo: pending    Above imaging reviewed in detail. ASSESSMENT/PLAN:    64 y.o. -male with history of T2DM, HLD, HTN, Kidney Failure presenting to [facility] on 01/[facility]/22 after EMS was called via the  department in which they were called for a well check. Patient chief complaint on presentation was poor appetite, cough and diarrhea. Neurology being consulted for altered mental status likely Covid encephalopathy superimposed on decreased cognitive reserve. Patient able to converse more with exam. Speech is now clear. Motor function/sensation grossly non focal/lateralizing. Plan of care as follows:     1.  AMS likely due to acute metabolic encephalopathy secondary to COVID-19 infection superimposed on decreased cognitive reserve. --CT head: see above  --MRI head wo: pending  --Continue on stroke prevention with daily ASA 81 mg  --PT/OT/ST per their recommendations  --Further recommendation pending neurodiagnostics      Patient discussed with attending physician Dr. Eloy Caruso  Thank you for allowing us to participate in the care of your patient. If there are any questions regarding evaluation please feel free to contact us.      Mak Nova, CRISTOBAL - CNP, 2/14/2022

## 2022-02-14 NOTE — CARE COORDINATION
Francis Tomas is following. Pt on PPN. Pt O2 level is better. Pt is on 4L. Doctor stated that pt will not be discharged on PPN. CM following.

## 2022-02-15 ENCOUNTER — APPOINTMENT (OUTPATIENT)
Dept: MRI IMAGING | Age: 77
DRG: 177 | End: 2022-02-15
Payer: MEDICARE

## 2022-02-15 LAB
ANION GAP SERPL CALCULATED.3IONS-SCNC: 9 MMOL/L (ref 4–16)
BUN BLDV-MCNC: 32 MG/DL (ref 6–23)
CALCIUM SERPL-MCNC: 8.4 MG/DL (ref 8.3–10.6)
CHLORIDE BLD-SCNC: 104 MMOL/L (ref 99–110)
CO2: 26 MMOL/L (ref 21–32)
CREAT SERPL-MCNC: 1.3 MG/DL (ref 0.9–1.3)
GFR AFRICAN AMERICAN: >60 ML/MIN/1.73M2
GFR NON-AFRICAN AMERICAN: 53 ML/MIN/1.73M2
GLUCOSE BLD-MCNC: 122 MG/DL (ref 70–99)
GLUCOSE BLD-MCNC: 122 MG/DL (ref 70–99)
GLUCOSE BLD-MCNC: 134 MG/DL (ref 70–99)
GLUCOSE BLD-MCNC: 157 MG/DL (ref 70–99)
GLUCOSE BLD-MCNC: 186 MG/DL (ref 70–99)
GLUCOSE BLD-MCNC: 277 MG/DL (ref 70–99)
MAGNESIUM: 1.8 MG/DL (ref 1.8–2.4)
PHOSPHORUS: 2.3 MG/DL (ref 2.5–4.9)
POTASSIUM SERPL-SCNC: 4.1 MMOL/L (ref 3.5–5.1)
SODIUM BLD-SCNC: 139 MMOL/L (ref 135–145)

## 2022-02-15 PROCEDURE — 2580000003 HC RX 258: Performed by: INTERNAL MEDICINE

## 2022-02-15 PROCEDURE — 99232 SBSQ HOSP IP/OBS MODERATE 35: CPT | Performed by: INTERNAL MEDICINE

## 2022-02-15 PROCEDURE — 84100 ASSAY OF PHOSPHORUS: CPT

## 2022-02-15 PROCEDURE — 83735 ASSAY OF MAGNESIUM: CPT

## 2022-02-15 PROCEDURE — 76937 US GUIDE VASCULAR ACCESS: CPT

## 2022-02-15 PROCEDURE — 6360000002 HC RX W HCPCS: Performed by: INTERNAL MEDICINE

## 2022-02-15 PROCEDURE — 6370000000 HC RX 637 (ALT 250 FOR IP): Performed by: HOSPITALIST

## 2022-02-15 PROCEDURE — 36415 COLL VENOUS BLD VENIPUNCTURE: CPT

## 2022-02-15 PROCEDURE — 97530 THERAPEUTIC ACTIVITIES: CPT

## 2022-02-15 PROCEDURE — 80048 BASIC METABOLIC PNL TOTAL CA: CPT

## 2022-02-15 PROCEDURE — 6360000002 HC RX W HCPCS: Performed by: HOSPITALIST

## 2022-02-15 PROCEDURE — 82962 GLUCOSE BLOOD TEST: CPT

## 2022-02-15 PROCEDURE — 1200000000 HC SEMI PRIVATE

## 2022-02-15 PROCEDURE — 2580000003 HC RX 258: Performed by: FAMILY MEDICINE

## 2022-02-15 PROCEDURE — 94761 N-INVAS EAR/PLS OXIMETRY MLT: CPT

## 2022-02-15 PROCEDURE — 6370000000 HC RX 637 (ALT 250 FOR IP): Performed by: INTERNAL MEDICINE

## 2022-02-15 PROCEDURE — 6370000000 HC RX 637 (ALT 250 FOR IP): Performed by: FAMILY MEDICINE

## 2022-02-15 PROCEDURE — 2580000003 HC RX 258: Performed by: NURSE PRACTITIONER

## 2022-02-15 PROCEDURE — C9113 INJ PANTOPRAZOLE SODIUM, VIA: HCPCS | Performed by: HOSPITALIST

## 2022-02-15 RX ADMIN — METOPROLOL TARTRATE 25 MG: 25 TABLET, FILM COATED ORAL at 21:47

## 2022-02-15 RX ADMIN — SODIUM CHLORIDE, PRESERVATIVE FREE 10 ML: 5 INJECTION INTRAVENOUS at 21:00

## 2022-02-15 RX ADMIN — POTASSIUM BICARBONATE 10 MEQ: 782 TABLET, EFFERVESCENT ORAL at 21:47

## 2022-02-15 RX ADMIN — PANTOPRAZOLE SODIUM 40 MG: 40 INJECTION, POWDER, FOR SOLUTION INTRAVENOUS at 09:43

## 2022-02-15 RX ADMIN — CEFEPIME HYDROCHLORIDE 2000 MG: 2 INJECTION, POWDER, FOR SOLUTION INTRAVENOUS at 02:28

## 2022-02-15 RX ADMIN — LORAZEPAM 0.5 MG: 0.5 TABLET ORAL at 21:47

## 2022-02-15 RX ADMIN — SODIUM CHLORIDE, PRESERVATIVE FREE 10 ML: 5 INJECTION INTRAVENOUS at 09:51

## 2022-02-15 RX ADMIN — Medication 2000 UNITS: at 09:43

## 2022-02-15 RX ADMIN — OXYCODONE HYDROCHLORIDE 2.5 MG: 5 TABLET ORAL at 21:47

## 2022-02-15 RX ADMIN — SODIUM CHLORIDE, PRESERVATIVE FREE 10 ML: 5 INJECTION INTRAVENOUS at 09:44

## 2022-02-15 RX ADMIN — OXYCODONE HYDROCHLORIDE 2.5 MG: 5 TABLET ORAL at 09:44

## 2022-02-15 RX ADMIN — CEFEPIME HYDROCHLORIDE 2000 MG: 2 INJECTION, POWDER, FOR SOLUTION INTRAVENOUS at 13:41

## 2022-02-15 RX ADMIN — PANTOPRAZOLE SODIUM 40 MG: 40 INJECTION, POWDER, FOR SOLUTION INTRAVENOUS at 21:47

## 2022-02-15 RX ADMIN — SODIUM CHLORIDE, PRESERVATIVE FREE 10 ML: 5 INJECTION INTRAVENOUS at 21:48

## 2022-02-15 RX ADMIN — ENOXAPARIN SODIUM 30 MG: 100 INJECTION SUBCUTANEOUS at 09:43

## 2022-02-15 RX ADMIN — SODIUM CHLORIDE, PRESERVATIVE FREE 10 ML: 5 INJECTION INTRAVENOUS at 09:52

## 2022-02-15 RX ADMIN — ASPIRIN 81 MG CHEWABLE TABLET 81 MG: 81 TABLET CHEWABLE at 09:43

## 2022-02-15 RX ADMIN — METOPROLOL TARTRATE 25 MG: 25 TABLET, FILM COATED ORAL at 09:43

## 2022-02-15 ASSESSMENT — PAIN SCALES - GENERAL
PAINLEVEL_OUTOF10: 0
PAINLEVEL_OUTOF10: 10
PAINLEVEL_OUTOF10: 0
PAINLEVEL_OUTOF10: 7

## 2022-02-15 ASSESSMENT — PAIN SCALES - WONG BAKER: WONGBAKER_NUMERICALRESPONSE: 0

## 2022-02-15 NOTE — PROGRESS NOTES
Hospitalist Progress Note      Name:  Suly Lora /Age/Sex: 1945  (64 y.o. male)   MRN & CSN:  [id number] & [id number] Admission Date/Time: 2022  1:32 PM   Location:  [facility]-A PCP: Christina Decekr MD         Hospital Day: 19    Assessment and Plan:   Patient is 77-year-old male past medical history of chronic pain syndrome, chronic oxycodone use, diabetes, hypertension, hyperlipidemia who was admitted for confusion, generalized weakness. Patient was found to have COVID-19 pneumonia. Patient continues to require oxygen but is weaning; no improvement in mental status so Neurology now following     Acute hypoxic respiratory failure due to viral pneumonia from COVID-19  Viral pneumonia from COVID-19  Bacterial PNA with sepsis  - On admission,patient's oxygen requirement slowly worsened to 15 L but has slowly improved, currently on 3L NC  -Continue bronchodilators   -Continue dexamethasone  -Completed baricitinib  -Completed zithromax  -Seen by ID  and started on vanc and cefepime given elevated CRP/procal suspicious for bacterial infection.  -Follow blood cultures (NGTD); MRSA screen negative  - Appreciate Pulmonology and ID recs     Encephalopathy, likely metabolic  -Suspect metabolic encephalopathy and is improving daily albeit slowly; Neurology currently following and MRI pending  - Holding CNS depressant meds including Neurontin, Tegretol and Roxicodone.  - CT head 22 neg  - Still awaiting MRI     Acute kidney injury likely due to ATN likely due to prerenal azotemia  Metabolic acidosis. - Nephrology following  - Awaiting AM labs     Hypernatremia   - Resolved     Hypophosphatemia/hypokalemia  - Replaced. Both normal.     Sinus tachycardia/junctional tachycardia  - Cardiology following  - Continue metoprolol,bp soft so decrease from 50 mg twice daily to 25 mg bid and monitor. .  Improved.      Generalized weakness and physical deconditioning  - PT/OT following    Poor oral intake  - On PPN  - Family declined PICC and patient was too combative and was unable to be placed  - Wife also no hesitant on PEG although patient is eating more per bedside nurse this morning so will monitor and encourage PO intake with bedside feeder     Hyperglycemia  History of Type II DM  - Endocrinology following.  - On low sliding scale    Hypertension  - Home meds; adjust as needed    Reason for continued hospitalization: Encephalopathy resolution (improving daily); awaiting MRI, PPN, labs pending     Diet ADULT ORAL NUTRITION SUPPLEMENT; Breakfast, Lunch; Fortified Pudding Oral Supplement  ADULT ORAL NUTRITION SUPPLEMENT; Lunch, Dinner; Frozen Oral Supplement  ADULT DIET; Dysphagia - Pureed; Mildly Thick (Nectar)  PN-Adult Premix 4.25/10 - Peripheral Line     History of Present Illness: The patient was lying in the bed. PPN was running. Note chest pain or shortness of breath    Objective: Intake/Output Summary (Last 24 hours) at 2/15/2022 1043  Last data filed at 2/14/2022 1138  Gross per 24 hour   Intake 100 ml   Output --   Net 100 ml      Vitals:   Vitals:    02/15/22 0915   BP: 113/71   Pulse: 82   Resp: 19   Temp: 98 °F (36.7 °C)   SpO2: 94%     Physical Exam:     GEN No acute distress. Lethargic  HEENT moist mucous membranes, no icterus  RESP CTA B  CVS:   RRR  GI/ S/NT/ND BS+   MSK No gross joint deformities. SKIN Normal coloration, warm, dry.   NEURO no focal deficit  PSYCH awake alert oriented    Medications:   Medications:    sodium chloride flush  5-40 mL IntraVENous 2 times per day    sodium chloride flush  5-40 mL IntraVENous 2 times per day    insulin lispro  0-6 Units SubCUTAneous TID     insulin lispro  0-3 Units SubCUTAneous 2 times per day    [Held by provider] insulin glargine  5 Units SubCUTAneous Nightly    metoprolol tartrate  25 mg Oral BID    [Held by provider] insulin lispro  5 Units SubCUTAneous TID     fat emulsion  250 mL IntraVENous Once per day on Mon Tue Thu Fri    oxyCODONE  2.5 mg Oral BID    LORazepam  0.5 mg Oral Nightly    sodium chloride flush  5-40 mL IntraVENous 2 times per day    potassium bicarb-citric acid  10 mEq Oral BID    cefepime  2,000 mg IntraVENous Q12H    albuterol sulfate HFA  2 puff Inhalation 4x daily    ipratropium  2 puff Inhalation 4x daily    enoxaparin  30 mg SubCUTAneous Daily    pantoprazole  40 mg IntraVENous BID    [Held by provider] gabapentin  300 mg Oral TID    [Held by provider] carBAMazepine  100 mg Oral BID    sodium chloride flush  5-40 mL IntraVENous 2 times per day    Vitamin D  2,000 Units Oral Daily    aspirin  81 mg Oral Daily    famotidine  10 mg Oral Q48H      Infusions:    sodium chloride 25 mL (02/14/22 1613)    PN-Adult Premix 4.25/10 - Peripheral Line 42 mL/hr at 02/15/22 0031    sodium chloride      sodium chloride      dextrose       PRN Meds: sodium chloride flush, 5-40 mL, PRN  sodium chloride, 25 mL, PRN  sodium chloride flush, 5-40 mL, PRN  sodium chloride, 25 mL, PRN  sodium chloride flush, 5-40 mL, PRN  sodium chloride, 25 mL, PRN  glucose, 15 g, PRN  glucagon (rDNA), 1 mg, PRN  dextrose, 100 mL/hr, PRN  dextrose bolus (hypoglycemia), 125 mL, PRN   Or  dextrose bolus (hypoglycemia), 250 mL, PRN  sodium chloride flush, 5-40 mL, PRN  ondansetron, 4 mg, Q8H PRN   Or  ondansetron, 4 mg, Q6H PRN  polyethylene glycol, 17 g, Daily PRN  acetaminophen, 650 mg, Q6H PRN   Or  acetaminophen, 650 mg, Q6H PRN          Electronically signed by Lito Earl MD, MD on 2/15/2022 at 10:43 AM

## 2022-02-15 NOTE — PROGRESS NOTES
Physical Therapy    Physical Therapy Treatment Note  Name: Ricardo Grayson MRN: [id number] :   1945   Date:  2/15/2022   Admission Date: 2022 Room:  [address] [place] [address]   Restrictions/Precautions:        droplet plus   Communication with other providers:  RN   Subjective:  Patient states:  \"I'll sit up\" (upon arrival)   Pain:   Location, Type, Intensity (0/10 to 10/10):  Did not report any pain. Objective:    Observation:    Lying on right side in bed upon arrival.  Initially cooperative though became agitated and resistant to movement, limiting session. Stable vitals on room air. Treatment, including education/measures:  Pt needed modA to initiate and sequence LEs to EOB. Only partial participation from pt with constant VCs. Slightly resistive but eventually followed commands. Allowed feet to dangle at EOB or ~1 minute though after therapist assisted pt with trunk decided he did not want to sit up anymore and became irritable. ModA for assisting pts LEs back onto bed. Total assist for positioning midline at [facility]. Educated pt and provided increased encouragement for mobility due to prolonged immobility and hospital stay. Assessment / Impression:    Patient's tolerance of treatment:  Poor   Adverse Reaction: no  Significant change in status and impact:  no  Barriers to improvement:  Activity tolerance,strength, balance, cognition, prolonged hospital stay   Plan for Next Session:    Activity tolerance, strength, balance, bed mobility, transfers as tolerated   Time in:  1152  Time out:  1208  Timed treatment minutes:  10  Total treatment time:  16    Previously filed items:  Social/Functional History  Additional Comments: Pt nonverbal and unable to answer questions on this date.   Short term goals  Time Frame for Short term goals: 1 week  Short term goal 1: Pt will perform bed mobility modA w elevated HOB and use of rail  Short term goal 2: Pt will perform STS to/from EOB, chair, and toilet maxA and LRAD  Short term goal 3: Pt will perform bed<>chair transfer maxA and LRAD       Electronically signed by:    Kavitha Willard, [id number]  2/15/2022, 12:09 PM

## 2022-02-15 NOTE — PROGRESS NOTES
Brought pt down for mri but pt would not cooperate.  Pt refused to lay on back and forcibly resisted any movement -unable to obtain mri

## 2022-02-15 NOTE — PROGRESS NOTES
pulmonary      SUBJECTIVE: no sob at rest     OBJECTIVE    VITALS:  /71   Pulse 82   Temp 98 °F (36.7 °C) (Axillary)   Resp 19   Ht 6' (1.829 m)   Wt 158 lb 4.6 oz (71.8 kg)   SpO2 94%   BMI 21.47 kg/m²   HEAD AND FACE EXAM:  No throat injection, no active exudate,no thrush  NECK EXAM;No JVD, no masses, symmetrical  CHEST EXAM; Expansion equal and symmetrical, no masses  LUNG EXAM; Good breath sounds bilaterally. There are expiratory wheezes both lungs, there are crackles at both lung bases  CARDIOVASCULAR EXAM: Positive S1 and S2, no S3 or S4, no clicks ,no murmurs  RIGHT AND LEFT LOWER EXTRIMITY EXAM: No edema, no swelling, no inflamation            LABS   Lab Results   Component Value Date    WBC 12.1 (H) 02/11/2022    HGB 11.6 (L) 02/11/2022    HCT 36.6 (L) 02/11/2022    MCV 97.9 02/11/2022    PLT 97 (L) 02/11/2022     Lab Results   Component Value Date    CREATININE 1.4 (H) 02/13/2022    BUN 36 (H) 02/13/2022     02/13/2022    K 3.9 02/13/2022     02/13/2022    CO2 25 02/13/2022     Lab Results   Component Value Date    INR 5.10 (HH) 02/01/2022    PROTIME 66.9 (H) 02/01/2022          Lab Results   Component Value Date    PHOS 2.2 02/13/2022    PHOS 3.1 02/12/2022    PHOS 1.8 02/11/2022      No results for input(s): PH, PO2ART, XBB5HFH, HCO3, BEART, O2SAT in the last 72 hours. Wt Readings from Last 3 Encounters:   02/15/22 158 lb 4.6 oz (71.8 kg)   03/14/17 200 lb (90.7 kg)   02/19/16 200 lb 6.4 oz (90.9 kg)               ASSESMENT  covid pneumonia  Superadded bact pneumonia        PLAN  1. antibx  2.  Bd rx as needed  3. o2 as needed    2/15/2022  Anca Alicia MD, M.RADHA.

## 2022-02-15 NOTE — PROGRESS NOTES
Infectious Disease Progress Note  2/15/2022   Patient Name: Miranda Caballero : 1945       Reason for visit: F/u COVID-19 pneumonia, acute respiratory failure? History:? Interval history noted  Awake, signifies no complaint  Physical Exam:  Vital Signs: /71   Pulse 82   Temp 98 °F (36.7 °C) (Axillary)   Resp 19   Ht 6' (1.829 m)   Wt 158 lb 4.6 oz (71.8 kg)   SpO2 94%   BMI 21.47 kg/m²     Gen: Awake, not answering questions but respond to commands  Skin: no stigmata of endocarditis  Wounds: C/D/I  HEMT: AT/NC Oropharynx pink, moist, and without lesions or exudates  Eyes: PERRLA, EOMI, conjunctiva pink, sclera anicteric. Neck: Supple. Trachea midline. No LAD. Chest: Reduced air entry bilaterally  Heart: RRR  Abd: soft, non-distended, no tenderness, no hepatomegaly. Normoactive bowel sounds. Ext: no clubbing, cyanosis, or edema  Catheter Site: without erythema or tenderness  LDA:   Neuro: Mental status intact. Radiologic / Imaging / TESTING  No results found.      Labs:    Recent Results (from the past 24 hour(s))   POCT Glucose    Collection Time: 22 11:29 AM   Result Value Ref Range    POC Glucose 283 (H) 70 - 99 MG/DL   POCT Glucose    Collection Time: 22  4:24 PM   Result Value Ref Range    POC Glucose 239 (H) 70 - 99 MG/DL   POCT Glucose    Collection Time: 22  9:27 PM   Result Value Ref Range    POC Glucose 193 (H) 70 - 99 MG/DL   POCT Glucose    Collection Time: 02/15/22  2:05 AM   Result Value Ref Range    POC Glucose 134 (H) 70 - 99 MG/DL   POCT Glucose    Collection Time: 02/15/22  7:26 AM   Result Value Ref Range    POC Glucose 157 (H) 70 - 99 MG/DL     CULTURE results: Invalid input(s): BLOOD CULTURE,  URINE CULTURE, SURGICAL CULTURE    Diagnosis:  Patient Active Problem List   Diagnosis    Acute [facility] [address] [address]    Pneumonia due to COVID-19 virus    Acute respiratory failure with hypoxia ([facility] [address]       Active Problems  Principal Problem:    Acute kidney injury [facility])  Active Problems:    Pneumonia due to COVID-19 virus    Acute respiratory failure with hypoxia ([address]  Resolved Problems:    * No resolved hospital problems. *      Impression and plan   Summary and rationale: Patient is a 64 y.o.  male with a medical history of diabetes mellitus, hypertension, hyperlipidemia, not vaccinated against COVID-19 who was admitted on 01/20/2022 for 2-week history of cough, poor appetite and diarrhea. Diagnosed with severe COVID-19 pneumonia and acute hypoxic respiratory failure. Consulted on 02/09/2022 for worsening status. Elevated procalcitonin and CRP suggestive of cysts secondary bacterial pneumonia. Seen by neurology service, diagnosed with metabolic encephalopathy on reduced cognitive reserve.  MRSA nares negative   COVID-19 symptom onset: 01/21/2022   COVID-19 test date: 01/28/2022   Expected discontinuation of isolation precautions: 02/10/2022   Clinical status: improving, off oxygen.    Therapeutic:  o Ongoing antibiotics:continue cefepime (2/9-), end date 2/15/2022  o Immunomodulators:  - Dexamethasone: Yes  - Baricitinib: Yes  o Completed antibiotics: Azithromycin, Discontinue vancomycin 2/9-12  o Other agents:    Diagnostic: Trend CRP and procalcitonin   F/u:    Other:       Electronically signed by: Electronically signed by Connie Naik MD on 2/15/2022 at 10:32 AM

## 2022-02-15 NOTE — PROGRESS NOTES
Checked on patient about 2115 resting in bed quietly. Was called back in patient had taken everything out all lines about 2200.

## 2022-02-15 NOTE — CONSULTS
Consult completed. Nexiva 22g 1inch catheter inserted via ultrasound in patient's left wrist.  Brisk blood return noted and catheter flushes with ease. Patient tolerated well. Long Amato, primary RN notified. Consult IV/PICC team if patient's needs change.

## 2022-02-15 NOTE — PROGRESS NOTES
Progress Note( Dr. Ghazala Mcmanus)    Subjective:   Admit Date: 1/28/2022  PCP: Killian Staton MD    Admitted For : altered mental state with generalized weakness positive for Covid pneumonia    Consulted For: Better control ol of blood glucose    Interval History:r patient mental condition bit better now     patient appeared to be confused  Pulled out all all the IV tubes and having a lot of bleeding  At the nurses right this evening on  He is very was confused has not noted after call the nursing staff to change all of his beds and clothing  Start IVs again  He has TPN going    Denies any chest pains,   Yes SOB . On  oxygen  Denies nausea or vomiting. Eating much better fed by his wife  No new bowel or bladder symptoms. Intake/Output Summary (Last 24 hours) at 2/14/2022 2348  Last data filed at 2/14/2022 1138  Gross per 24 hour   Intake 220 ml   Output --   Net 220 ml       DATA    CBC:   No results for input(s): WBC, HGB, PLT in the last 72 hours. CMP:  Recent Labs     02/12/22  1420 02/13/22  1738    138   K 3.9 3.9    103   CO2 26 25   BUN 40* 36*   CREATININE 1.6* 1.4*   CALCIUM 7.4* 7.8*     Lipids:   Lab Results   Component Value Date    TRIG 190 02/09/2022     Glucose:  Recent Labs     02/14/22  1129 02/14/22  1624 02/14/22  2127   POCGLU 283* 239* 193*     [id number]:  Lab Results   Component Value Date    LABA1C 6.0 01/29/2022     High Sensitivity TSH:   Lab Results   Component Value Date    TSHHS 0.335 02/01/2022     Free T3: No results found for: FT3  Free T4:  Lab Results   Component Value Date    T4FREE 1.28 02/01/2022       CT HEAD WO CONTRAST   Final Result   No acute intracranial abnormality. RECOMMENDATIONS:   Unavailable         XR CHEST PORTABLE   Final Result   Bilateral hazy parenchymal opacities greater on the left compatible with   pneumonia. Slightly diminished inspiratory effort.          XR CHEST PORTABLE   Final Result   Left basilar infiltrates concerning for pneumonia. XR CHEST PORTABLE   Final Result   Mild bibasilar airspace disease, greater on the left, most likely pneumonia. VL DUP LOWER EXTREMITY VENOUS BILATERAL   Final Result   Left anterior tibial vein is not visualized and patient trying to get out of   bed during the exam.      No definite deep venous thrombosis is demonstrated. NM LUNG SCAN PERFUSION ONLY   Final Result   Low Probability for Pulmonary Embolus. CT HEAD WO CONTRAST   Final Result   1. No acute intracranial abnormality. 2. Diffuse parenchymal volume loss with mild-to-moderate chronic white matter   microvascular ischemic changes. 3. Chronic lacunar infarct in the right thalamus. XR CHEST PORTABLE   Final Result   Ill-defined left-greater-than-right bibasilar airspace disease most   consistent with pneumonia.          XR CHEST PORTABLE    (Results Pending)   MRI BRAIN WO CONTRAST    (Results Pending)        Scheduled Medicines   Medications:    sodium chloride flush  5-40 mL IntraVENous 2 times per day    lidocaine PF  5 mL IntraDERmal Once    sodium chloride flush  5-40 mL IntraVENous 2 times per day    insulin lispro  0-6 Units SubCUTAneous TID WC    insulin lispro  0-3 Units SubCUTAneous 2 times per day    [Held by provider] insulin glargine  5 Units SubCUTAneous Nightly    metoprolol tartrate  25 mg Oral BID    [Held by provider] insulin lispro  5 Units SubCUTAneous TID WC    fat emulsion  250 mL IntraVENous Once per day on Mon Tue Thu Fri    oxyCODONE  2.5 mg Oral BID    LORazepam  0.5 mg Oral Nightly    lidocaine  5 mL IntraDERmal Once    sodium chloride flush  5-40 mL IntraVENous 2 times per day    potassium bicarb-citric acid  10 mEq Oral BID    cefepime  2,000 mg IntraVENous Q12H    albuterol sulfate HFA  2 puff Inhalation 4x daily    ipratropium  2 puff Inhalation 4x daily    enoxaparin  30 mg SubCUTAneous Daily    pantoprazole  40 mg IntraVENous BID    [Held by provider] gabapentin  300 mg Oral TID    [Held by provider] carBAMazepine  100 mg Oral BID    sodium chloride flush  5-40 mL IntraVENous 2 times per day    Vitamin D  2,000 Units Oral Daily    aspirin  81 mg Oral Daily    famotidine  10 mg Oral Q48H      Infusions:    sodium chloride 25 mL (02/14/22 1613)    PN-Adult Premix 4.25/10 - Peripheral Line Stopped (02/14/22 2200)    sodium chloride      sodium chloride      dextrose           Objective:   Vitals: /78   Pulse 66   Temp 97.3 °F (36.3 °C) (Oral)   Resp 16   Ht 6' (1.829 m)   Wt 160 lb 11.5 oz (72.9 kg)   SpO2 98%   BMI 21.80 kg/m²   General appearance: alert and cooperative with exam feels confused  Neck: no JVD or bruit  Thyroid : Normal lobes   Lungs: Has Vesicular Breath sounds onBiPAP by  Heart:  regular rate and rhythm  Abdomen: soft, non-tender; bowel sounds normal; no masses,  no organomegaly  Musculoskeletal: Normal  Extremities: extremities normal, , no edema  Neurologic:  Awake, alert, oriented to name, place and time. Appears to be confused cranial nerves II-XII are grossly intact. Motor is  intact. Sensory is intact. ,  and gait is abnormal.  And unstable    Assessment:     Patient Active Problem List:     Acute kidney injury ([facility] [address]     Diabetes mellitus type II mild       Bilateral pneumonia possibly Covid related      Possible encephalopathy metabolic versus drug          Plan:     1. Reviewed POC blood glucose . Labs and X ray results   2. Reviewed Current Medicines   3. On meal plus correction bolus Humalog/ Basal Lantus Insulin regime  4. Backup he was placed on D5 normal saline  5. Monitor Blood glucose frequently   6. Modified  the dose of Insulin/ other medicines as needed   7. Will follow     .      Luis Flores MD, MD

## 2022-02-16 LAB
ANION GAP SERPL CALCULATED.3IONS-SCNC: 11 MMOL/L (ref 4–16)
BUN BLDV-MCNC: 31 MG/DL (ref 6–23)
CALCIUM SERPL-MCNC: 8.1 MG/DL (ref 8.3–10.6)
CHLORIDE BLD-SCNC: 105 MMOL/L (ref 99–110)
CO2: 25 MMOL/L (ref 21–32)
CREAT SERPL-MCNC: 1.4 MG/DL (ref 0.9–1.3)
GFR AFRICAN AMERICAN: 59 ML/MIN/1.73M2
GFR NON-AFRICAN AMERICAN: 49 ML/MIN/1.73M2
GLUCOSE BLD-MCNC: 123 MG/DL (ref 70–99)
GLUCOSE BLD-MCNC: 124 MG/DL (ref 70–99)
GLUCOSE BLD-MCNC: 125 MG/DL (ref 70–99)
GLUCOSE BLD-MCNC: 126 MG/DL (ref 70–99)
GLUCOSE BLD-MCNC: 140 MG/DL (ref 70–99)
GLUCOSE BLD-MCNC: 181 MG/DL (ref 70–99)
GLUCOSE BLD-MCNC: 200 MG/DL (ref 70–99)
MAGNESIUM: 1.8 MG/DL (ref 1.8–2.4)
PHOSPHORUS: 2.3 MG/DL (ref 2.5–4.9)
POTASSIUM SERPL-SCNC: 3.8 MMOL/L (ref 3.5–5.1)
SODIUM BLD-SCNC: 141 MMOL/L (ref 135–145)
TRIGL SERPL-MCNC: 118 MG/DL

## 2022-02-16 PROCEDURE — 6370000000 HC RX 637 (ALT 250 FOR IP): Performed by: HOSPITALIST

## 2022-02-16 PROCEDURE — 36415 COLL VENOUS BLD VENIPUNCTURE: CPT

## 2022-02-16 PROCEDURE — C9113 INJ PANTOPRAZOLE SODIUM, VIA: HCPCS | Performed by: HOSPITALIST

## 2022-02-16 PROCEDURE — 94761 N-INVAS EAR/PLS OXIMETRY MLT: CPT

## 2022-02-16 PROCEDURE — 2580000003 HC RX 258: Performed by: NURSE PRACTITIONER

## 2022-02-16 PROCEDURE — 84100 ASSAY OF PHOSPHORUS: CPT

## 2022-02-16 PROCEDURE — 99232 SBSQ HOSP IP/OBS MODERATE 35: CPT | Performed by: CLINICAL NURSE SPECIALIST

## 2022-02-16 PROCEDURE — 1200000000 HC SEMI PRIVATE

## 2022-02-16 PROCEDURE — 2580000003 HC RX 258: Performed by: INTERNAL MEDICINE

## 2022-02-16 PROCEDURE — 84478 ASSAY OF TRIGLYCERIDES: CPT

## 2022-02-16 PROCEDURE — 6370000000 HC RX 637 (ALT 250 FOR IP): Performed by: INTERNAL MEDICINE

## 2022-02-16 PROCEDURE — 82962 GLUCOSE BLOOD TEST: CPT

## 2022-02-16 PROCEDURE — 92526 ORAL FUNCTION THERAPY: CPT

## 2022-02-16 PROCEDURE — 6360000002 HC RX W HCPCS: Performed by: HOSPITALIST

## 2022-02-16 PROCEDURE — 80048 BASIC METABOLIC PNL TOTAL CA: CPT

## 2022-02-16 PROCEDURE — 83735 ASSAY OF MAGNESIUM: CPT

## 2022-02-16 PROCEDURE — 6370000000 HC RX 637 (ALT 250 FOR IP): Performed by: FAMILY MEDICINE

## 2022-02-16 RX ORDER — ALBUTEROL SULFATE 90 UG/1
2 AEROSOL, METERED RESPIRATORY (INHALATION) EVERY 4 HOURS PRN
Status: DISCONTINUED | OUTPATIENT
Start: 2022-02-16 | End: 2022-02-19 | Stop reason: HOSPADM

## 2022-02-16 RX ADMIN — POTASSIUM BICARBONATE 10 MEQ: 782 TABLET, EFFERVESCENT ORAL at 21:33

## 2022-02-16 RX ADMIN — LORAZEPAM 0.5 MG: 0.5 TABLET ORAL at 21:33

## 2022-02-16 RX ADMIN — ASPIRIN 81 MG CHEWABLE TABLET 81 MG: 81 TABLET CHEWABLE at 08:18

## 2022-02-16 RX ADMIN — PANTOPRAZOLE SODIUM 40 MG: 40 INJECTION, POWDER, FOR SOLUTION INTRAVENOUS at 21:33

## 2022-02-16 RX ADMIN — ENOXAPARIN SODIUM 30 MG: 100 INJECTION SUBCUTANEOUS at 08:19

## 2022-02-16 RX ADMIN — SODIUM CHLORIDE, PRESERVATIVE FREE 5 ML: 5 INJECTION INTRAVENOUS at 08:20

## 2022-02-16 RX ADMIN — POTASSIUM BICARBONATE 10 MEQ: 782 TABLET, EFFERVESCENT ORAL at 08:18

## 2022-02-16 RX ADMIN — GABAPENTIN 300 MG: 300 CAPSULE ORAL at 14:25

## 2022-02-16 RX ADMIN — METOPROLOL TARTRATE 25 MG: 25 TABLET, FILM COATED ORAL at 21:33

## 2022-02-16 RX ADMIN — PANTOPRAZOLE SODIUM 40 MG: 40 INJECTION, POWDER, FOR SOLUTION INTRAVENOUS at 08:18

## 2022-02-16 RX ADMIN — FAMOTIDINE 10 MG: 20 TABLET, FILM COATED ORAL at 08:19

## 2022-02-16 RX ADMIN — SODIUM CHLORIDE, PRESERVATIVE FREE 10 ML: 5 INJECTION INTRAVENOUS at 08:19

## 2022-02-16 RX ADMIN — Medication 2000 UNITS: at 08:18

## 2022-02-16 RX ADMIN — SODIUM CHLORIDE, PRESERVATIVE FREE 10 ML: 5 INJECTION INTRAVENOUS at 21:33

## 2022-02-16 RX ADMIN — CARBAMAZEPINE 100 MG: 100 TABLET, EXTENDED RELEASE ORAL at 21:34

## 2022-02-16 RX ADMIN — METOPROLOL TARTRATE 25 MG: 25 TABLET, FILM COATED ORAL at 08:18

## 2022-02-16 RX ADMIN — GABAPENTIN 300 MG: 300 CAPSULE ORAL at 21:33

## 2022-02-16 ASSESSMENT — PAIN SCALES - GENERAL
PAINLEVEL_OUTOF10: 0

## 2022-02-16 ASSESSMENT — PAIN SCALES - WONG BAKER
WONGBAKER_NUMERICALRESPONSE: 0
WONGBAKER_NUMERICALRESPONSE: 0

## 2022-02-16 NOTE — PROGRESS NOTES
most likely pneumonia. VL DUP LOWER EXTREMITY VENOUS BILATERAL   Final Result   Left anterior tibial vein is not visualized and patient trying to get out of   bed during the exam.      No definite deep venous thrombosis is demonstrated. NM LUNG SCAN PERFUSION ONLY   Final Result   Low Probability for Pulmonary Embolus. CT HEAD WO CONTRAST   Final Result   1. No acute intracranial abnormality. 2. Diffuse parenchymal volume loss with mild-to-moderate chronic white matter   microvascular ischemic changes. 3. Chronic lacunar infarct in the right thalamus. XR CHEST PORTABLE   Final Result   Ill-defined left-greater-than-right bibasilar airspace disease most   consistent with pneumonia.          XR CHEST PORTABLE    (Results Pending)        Scheduled Medicines   Medications:    sodium chloride flush  5-40 mL IntraVENous 2 times per day    sodium chloride flush  5-40 mL IntraVENous 2 times per day    insulin lispro  0-6 Units SubCUTAneous TID WC    insulin lispro  0-3 Units SubCUTAneous 2 times per day    [Held by provider] insulin glargine  5 Units SubCUTAneous Nightly    metoprolol tartrate  25 mg Oral BID    [Held by provider] insulin lispro  5 Units SubCUTAneous TID WC    oxyCODONE  2.5 mg Oral BID    LORazepam  0.5 mg Oral Nightly    sodium chloride flush  5-40 mL IntraVENous 2 times per day    potassium bicarb-citric acid  10 mEq Oral BID    albuterol sulfate HFA  2 puff Inhalation 4x daily    ipratropium  2 puff Inhalation 4x daily    enoxaparin  30 mg SubCUTAneous Daily    pantoprazole  40 mg IntraVENous BID    [Held by provider] gabapentin  300 mg Oral TID    [Held by provider] carBAMazepine  100 mg Oral BID    sodium chloride flush  5-40 mL IntraVENous 2 times per day    Vitamin D  2,000 Units Oral Daily    aspirin  81 mg Oral Daily    famotidine  10 mg Oral Q48H      Infusions:    sodium chloride 25 mL (02/14/22 1613)    sodium chloride      sodium chloride      dextrose           Objective:   Vitals: /62   Pulse 75   Temp 97.8 °F (36.6 °C) (Axillary)   Resp 14   Ht 6' (1.829 m)   Wt 158 lb 4.6 oz (71.8 kg)   SpO2 100%   BMI 21.47 kg/m²   General appearance: alert and not cooperative with exam feels confused  Neck: no JVD or bruit  Thyroid : Normal lobes   Lungs: Has Vesicular Breath sounds onBiPAP by  Heart:  regular rate and rhythm  Abdomen: soft, non-tender; bowel sounds normal; no masses,  no organomegaly  Musculoskeletal: Normal  Extremities: extremities normal, , no edema  Neurologic:  Awake, alert, oriented to name, place and time. Appears to be confused cranial nerves II-XII are grossly intact. Motor is  intact. Sensory is intact. ,  and gait is abnormal.  And unstable    Assessment:     Patient Active Problem List:     Acute kidney injury ([facility] [address]     Diabetes mellitus type II mild       Bilateral pneumonia possibly Covid related      Possible encephalopathy metabolic versus drug          Plan:     1. Reviewed POC blood glucose . Labs and X ray results   2. Reviewed Current Medicines   3. On meal plus correction bolus Humalog/ Basal Lantus Insulin regime  4. Backup he was placed on D5 normal saline  5. Monitor Blood glucose frequently   6. Modified  the dose of Insulin/ other medicines as needed   7. Will follow     .      Jayson Hinton MD, MD

## 2022-02-16 NOTE — PROGRESS NOTES
[id number] [facility] OF SPEECH/LANGUAGE PATHOLOGY  DAILY PROGRESS NOTE  Genny Mauro  2/16/2022  [id number]  Hypernatremia [E87.0]  Hypoxia [R09.02]  Elevated serum creatinine [R79.89]  Acute kidney injury ([facility] [address] [N17.9]  Elevated lipase [R74.8]  Altered mental status, unspecified altered mental status type [R41.82]  COVID [U07.1]  No Known Allergies      Pt was seen this date for dysphagia treatment. IMPRESSION AND RECOMMENDATIONS:   Genny Mauro was seen for dysphagia follow-up. Wife present throughout. He required continual assistance for upright positioning and repositioning throughout. Targeted trials for possible diet advancement with ice chips, thin liquids via tsp/cup/straw, puree, soft solids, and regular solids. Oral stage continues impaired with adequate labial seal, prolonged/repetitive \"munching\" mastication with decreased bolus formation, lingual pumping, and minimal residue for soft and regular solids. Suspect premature pharyngeal entry/reduced lingual coordination further impacted by reduced awareness 2/2 cognitive deficits. Suspect pharyngeal dysphagia with reduced laryngeal elevation. Immediate coughing following approximately 50% trials of thin liquids via cup and 3/3 trials of thin liquids via straw. Education/diet recommendations d/w pt's wife in detail. Recommend advancement to minced/moist diet, continued nectar thick liquids, total feed, with aspiration precautions. Allow ice chips and small sips of water for comfort. Recommendations d/w wife. SLP will follow. GOALS (current status in bold):  Short-term Goals  Timeframe for Short-term Goals: 2 weeks  Goal 1: Pt will tolerate pureed diet and nectar thick liquids without clinical evidence for aspiration 100% Partially met, advance to minced/moist diet  Goal 2: Pt will tolerate trials of advanced textures with adequate oral manipulation/clearance and no s/s aspiration.  Partially met, continue  Goal 3: Caregivers will use aspiration precautions for all PO intake 100% Meeting, discussed recommendations and plan with wife in detail         EDUCATION: spoke with wife re: recommendations/plan    PAIN RATING (0-10 Scale): does not rate, appears uncomfortable  Time in/Time out: SLP Individual Minutes  Time In: 1550  Time Out: [address]  Minutes: 35    Visit number: [address] [facility], [address] [place] [address], 2/16/2022

## 2022-02-16 NOTE — PROGRESS NOTES
Comprehensive Nutrition Assessment    Type and Reason for Visit:  Reassess    Nutrition Recommendations/Plan:   · Continue current diet as per SLP  · Continue current oral nutrition supplements, between meals  · PO intakes appreciated, total assist with feeding    Nutrition Assessment:  Pt continues with slowly improving confusion and po intake. Total assist with feeding, consuming greater than half to all of recent meals. Taking about half of oral supplements (frozen and pudding ordered 2/2 nectar thickened liquids). No wt loss this week. Will continue to follow as moderate nutrition risk. Malnutrition Assessment:  Malnutrition Status:  Insufficient data    Context:  Acute Illness       Estimated Daily Nutrient Needs:  Energy (kcal):  9613-0150; Weight Used for Energy Requirements:  Current     Protein (g):  80-90; Weight Used for Protein Requirements:  Ideal        Fluid (ml/day):  1500; Method Used for Fluid Requirements:  1 ml/kcal      Nutrition Related Findings:  BUN, 31, Cr 1.4, Glu 125, Phos 2.3      Wounds:  None       Current Nutrition Therapies:    ADULT ORAL NUTRITION SUPPLEMENT; Breakfast, Lunch; Fortified Pudding Oral Supplement  ADULT ORAL NUTRITION SUPPLEMENT; Lunch, Dinner; Frozen Oral Supplement  ADULT DIET;  Dysphagia - Pureed; Mildly Thick (Nectar)    Anthropometric Measures:  · Height: 6' (182.9 cm)  · Current Body Weight: 158 lb 4.6 oz (71.8 kg)   · Admission Body Weight: 157 lb (71.2 kg) (ADELE)    · Usual Body Weight:  (ADELE)     · Ideal Body Weight: 178 lbs; % Ideal Body Weight 88.2 %   · BMI: 21.5  · BMI Categories: Underweight (BMI less than 22) age over 72       Nutrition Diagnosis:   · Predicted inadequate energy intake related to acute injury/trauma as evidenced by intake 51-75%    Nutrition Interventions:   Food and/or Nutrient Delivery:  Continue Current Diet,Continue Oral Nutrition Supplement  Nutrition Education/Counseling:  No recommendation at this time   Coordination of Nutrition Care:  Continue to monitor while inpatient,Feeding Assistance/Environment Change,Speech Therapy,Swallow Evaluation    Goals:  Pt will consume at least 75% of his meals and supplements       Nutrition Monitoring and Evaluation:   Behavioral-Environmental Outcomes:  None Identified   Food/Nutrient Intake Outcomes:  Food and Nutrient Intake,Supplement Intake  Physical Signs/Symptoms Outcomes:  Biochemical Data,Chewing or Swallowing,GI Status,Meal Time Behavior,Nutrition Focused Physical Findings,Weight     Discharge Planning:    Continue Oral Nutrition Supplement,Continue current diet     Electronically signed by Sadia Lovell RD, LD on 2/16/22 at 11:35 AM EST    Contact: [phone]

## 2022-02-16 NOTE — PROGRESS NOTES
pulmonary      SUBJECTIVE:  No sob     OBJECTIVE    VITALS:  BP (!) 161/87   Pulse 75   Temp 97.7 °F (36.5 °C) (Axillary)   Resp 14   Ht 6' (1.829 m)   Wt 158 lb 4.6 oz (71.8 kg)   SpO2 100%   BMI 21.47 kg/m²   HEAD AND FACE EXAM:  No throat injection, no active exudate,no thrush  NECK EXAM;No JVD, no masses, symmetrical  CHEST EXAM; Expansion equal and symmetrical, no masses  LUNG EXAM; Good breath sounds bilaterally. There are expiratory wheezes both lungs, there are crackles at both lung bases  CARDIOVASCULAR EXAM: Positive S1 and S2, no S3 or S4, no clicks ,no murmurs  RIGHT AND LEFT LOWER EXTRIMITY EXAM: No edema, no swelling, no inflamation            LABS   Lab Results   Component Value Date    WBC 12.1 (H) 02/11/2022    HGB 11.6 (L) 02/11/2022    HCT 36.6 (L) 02/11/2022    MCV 97.9 02/11/2022    PLT 97 (L) 02/11/2022     Lab Results   Component Value Date    CREATININE 1.3 02/15/2022    BUN 32 (H) 02/15/2022     02/15/2022    K 4.1 02/15/2022     02/15/2022    CO2 26 02/15/2022     Lab Results   Component Value Date    INR 5.10 (HH) 02/01/2022    PROTIME 66.9 (H) 02/01/2022          Lab Results   Component Value Date    PHOS 2.3 02/15/2022    PHOS 2.2 02/13/2022    PHOS 3.1 02/12/2022      No results for input(s): PH, PO2ART, HRM5VVD, HCO3, BEART, O2SAT in the last 72 hours. Wt Readings from Last 3 Encounters:   02/15/22 158 lb 4.6 oz (71.8 kg)   03/14/17 200 lb (90.7 kg)   02/19/16 200 lb 6.4 oz (90.9 kg)               ASSESMENT  Ac resp failure  covid pneumonia  Bact superadded pneumonia        PLAN  1. cpm  2.  Cont o2    2/16/2022  Lita Veronica MD, M.D.

## 2022-02-16 NOTE — PROGRESS NOTES
Hospitalist Progress Note      Name:  Maurice Sifuentes /Age/Sex: 1945  (64 y.o. male)   MRN & CSN:  [id number] & [id number] Admission Date/Time: 2022  1:32 PM   [facility] PCP: Valencia Cook MD         Hospital Day: 20    Assessment and Plan:   Patient is 25-year-old male past medical history of chronic pain syndrome, chronic oxycodone use, diabetes, hypertension, hyperlipidemia who was admitted for confusion, generalized weakness. Patient was found to have COVID-19 pneumonia. Patient continues to require oxygen but is weaning; no improvement in mental status so Neurology now following     Acute hypoxic respiratory failure due to viral pneumonia from COVID-19  Viral pneumonia from COVID-19  Bacterial PNA with sepsis  - On admission,patient's oxygen requirement slowly worsened to 15 L but has slowly improved. The patient remained on 3 L and satting 96%. -Continue bronchodilators   -Continue dexamethasone. We will likely DC dexamethasone tomorrow  -Completed baricitinib  -Completed zithromax  -Seen by ID  and started on vanc and cefepime given elevated CRP/procal suspicious for bacterial infection.  -Follow blood cultures (NGTD); MRSA screen negative  - Appreciate Pulmonology and ID recs     Encephalopathy, likely metabolic  -Suspect metabolic encephalopathy and is improving daily albeit slowly; Neurology currently following and MRI pending  - Holding CNS depressant meds including Neurontin, Tegretol and Roxicodone.  - CT head 22 neg  -The patient was unable to tolerate MRI. Awaiting recommendations from neurology      Acute kidney injury likely due to ATN likely due to prerenal azotemia  Metabolic acidosis. - Nephrology following  - Awaiting AM labs     Hypernatremia   - Resolved     Hypophosphatemia/hypokalemia  - Replaced.  Both normal.     Sinus tachycardia/junctional tachycardia  - Cardiology following  - Continue metoprolol,bp soft so decrease from 50 mg twice daily to 25 mg bid and monitor. .  Improved. Generalized weakness and physical deconditioning  - PT/OT following    Poor oral intake  -The patient was on PPN. PPN has been stopped as the patient is documented to be eating 75 to 100% of his meals. Strict I's and O's and nutritional support     Hyperglycemia  History of Type II DM  - Endocrinology following.  - On low sliding scale    Hypertension  - Home meds; adjust as needed    Reason for continued hospitalization: Persistent encephalopathy. MRI pending. Neurology recommendations    Diet ADULT ORAL NUTRITION SUPPLEMENT; Breakfast, Lunch; Fortified Pudding Oral Supplement  ADULT ORAL NUTRITION SUPPLEMENT; Lunch, Dinner; Frozen Oral Supplement  ADULT DIET; Dysphagia - Pureed; Mildly Thick (Nectar)     History of Present Illness:     Patient was lying in the bed. Patient was not willing to talk. Nurse at the bedside and feeding him breakfast.  Off PPN. Overnight the patient was unable to tolerate MRI due to agitation. Objective: Intake/Output Summary (Last 24 hours) at 2/16/2022 0944  Last data filed at 2/15/2022 1715  Gross per 24 hour   Intake 360 ml   Output --   Net 360 ml      Vitals:   Vitals:    02/16/22 0815   BP: 115/69   Pulse: 82   Resp: 20   Temp: 97.1 °F (36.2 °C)   SpO2: 96%     Physical Exam:     GEN No acute distress. HEENT dry mucous membranes, no icterus  RESP CTA B  CVS:   RRR  GI/ S/NT/ND BS+   MSK No gross joint deformities. SKIN Normal coloration, warm, dry.   NEURO flat affect, not cooperative  PSYCH Awake and alert    Medications:   Medications:    sodium chloride flush  5-40 mL IntraVENous 2 times per day    sodium chloride flush  5-40 mL IntraVENous 2 times per day    insulin lispro  0-6 Units SubCUTAneous TID     insulin lispro  0-3 Units SubCUTAneous 2 times per day    [Held by provider] insulin glargine  5 Units SubCUTAneous Nightly    metoprolol tartrate  25 mg Oral BID    [Held by provider] insulin lispro  5 Units SubCUTAneous TID WC    LORazepam  0.5 mg Oral Nightly    sodium chloride flush  5-40 mL IntraVENous 2 times per day    potassium bicarb-citric acid  10 mEq Oral BID    enoxaparin  30 mg SubCUTAneous Daily    pantoprazole  40 mg IntraVENous BID    [Held by provider] gabapentin  300 mg Oral TID    [Held by provider] carBAMazepine  100 mg Oral BID    sodium chloride flush  5-40 mL IntraVENous 2 times per day    Vitamin D  2,000 Units Oral Daily    aspirin  81 mg Oral Daily    famotidine  10 mg Oral Q48H      Infusions:    sodium chloride 25 mL (02/14/22 1613)    sodium chloride      sodium chloride      dextrose       PRN Meds: albuterol sulfate HFA, 2 puff, Q4H PRN  ipratropium, 2 puff, Q4H PRN  sodium chloride flush, 5-40 mL, PRN  sodium chloride, 25 mL, PRN  sodium chloride flush, 5-40 mL, PRN  sodium chloride, 25 mL, PRN  sodium chloride flush, 5-40 mL, PRN  sodium chloride, 25 mL, PRN  glucose, 15 g, PRN  glucagon (rDNA), 1 mg, PRN  dextrose, 100 mL/hr, PRN  dextrose bolus (hypoglycemia), 125 mL, PRN   Or  dextrose bolus (hypoglycemia), 250 mL, PRN  sodium chloride flush, 5-40 mL, PRN  ondansetron, 4 mg, Q8H PRN   Or  ondansetron, 4 mg, Q6H PRN  polyethylene glycol, 17 g, Daily PRN  acetaminophen, 650 mg, Q6H PRN   Or  acetaminophen, 650 mg, Q6H PRN          Electronically signed by Julio Pollard MD, MD on 2/16/2022 at 9:44 AM

## 2022-02-16 NOTE — PROGRESS NOTES
Neurology Service Progress Note  [facility]  Patient Name: Samm Flores  : 1945        Subjective:   Reason for consult: altered mental status  Patient seen and examined this morning. Chart reviewed in detail. Patient laying on right side. He is alert and knows he is in hospital. He was unable to tolerate MRI last evening. He is moving his extremities.       Past Medical History:   Diagnosis Date    DM (diabetes mellitus) ([address]     Dysphagia     Dysphonia     Hyperlipidemia     Hypertension     Kidney failure     Osteoarthritis     :   Past Surgical History:   Procedure Laterality Date    CERVICAL SPINE SURGERY      3 years ago    ENDOSCOPY, COLON, DIAGNOSTIC  2016    mild inflammation at the squamocolumnar junction- biopsied, no hiatal hernia, diffuse gastric erythema- biopsies to rule out chronic gastritis or intestinal metaplasia     Medications:  Scheduled Meds:   sodium chloride flush  5-40 mL IntraVENous 2 times per day    sodium chloride flush  5-40 mL IntraVENous 2 times per day    insulin lispro  0-6 Units SubCUTAneous TID WC    insulin lispro  0-3 Units SubCUTAneous 2 times per day    [Held by provider] insulin glargine  5 Units SubCUTAneous Nightly    metoprolol tartrate  25 mg Oral BID    [Held by provider] insulin lispro  5 Units SubCUTAneous TID WC    LORazepam  0.5 mg Oral Nightly    sodium chloride flush  5-40 mL IntraVENous 2 times per day    potassium bicarb-citric acid  10 mEq Oral BID    enoxaparin  30 mg SubCUTAneous Daily    pantoprazole  40 mg IntraVENous BID    gabapentin  300 mg Oral TID    carBAMazepine  100 mg Oral BID    sodium chloride flush  5-40 mL IntraVENous 2 times per day    Vitamin D  2,000 Units Oral Daily    aspirin  81 mg Oral Daily    famotidine  10 mg Oral Q48H     Continuous Infusions:   sodium chloride 25 mL (22 1613)    sodium chloride      sodium chloride      dextrose       PRN Meds:.albuterol sulfate HFA, ipratropium, sodium chloride flush, sodium chloride, sodium chloride flush, sodium chloride, sodium chloride flush, sodium chloride, glucose, glucagon (rDNA), dextrose, dextrose bolus (hypoglycemia) **OR** dextrose bolus (hypoglycemia), sodium chloride flush, ondansetron **OR** ondansetron, polyethylene glycol, acetaminophen **OR** acetaminophen    No Known Allergies  Social History     Socioeconomic History    Marital status:      Spouse name: Not on file    Number of children: Not on file    Years of education: Not on file    Highest education level: Not on file   Occupational History    Not on file   Tobacco Use    Smoking status: Former Smoker     Years: 50.00     Quit date: 2010     Years since quittin.1    Smokeless tobacco: Not on file   Substance and Sexual Activity    Alcohol use: No    Drug use: No    Sexual activity: Not on file   Other Topics Concern    Not on file   Social History Narrative    Not on file     Social Determinants of Health     Financial Resource Strain:     Difficulty of Paying Living Expenses: Not on file   Food Insecurity:     Worried About Running Out of Food in the Last Year: Not on file    Sharon of Food in the Last Year: Not on file   Transportation Needs:     Lack of Transportation (Medical): Not on file    Lack of Transportation (Non-Medical):  Not on file   Physical Activity:     Days of Exercise per Week: Not on file    Minutes of Exercise per Session: Not on file   Stress:     Feeling of Stress : Not on file   Social Connections:     Frequency of Communication with Friends and Family: Not on file    Frequency of Social Gatherings with Friends and Family: Not on file    Attends Restoration Services: Not on file    Active Member of Clubs or Organizations: Not on file    Attends Club or Organization Meetings: Not on file    Marital Status: Not on file   Intimate Partner Violence:     Fear of Current or Ex-Partner: Not on file  Emotionally Abused: Not on file    Physically Abused: Not on file    Sexually Abused: Not on file   Housing Stability:     Unable to Pay for Housing in the Last Year: Not on file    Number of Places Lived in the Last Year: Not on file    Unstable Housing in the Last Year: Not on file      Family History   Problem Relation Age of Onset    Cancer Sister 61        colon cancer    Cancer Father 68        stomach cancer spread to liver       Physical Exam:       Vitals:    02/15/22 1940 02/16/22 0315 02/16/22 0815 02/16/22 1130   BP:  (!) 161/87 115/69    Pulse:   82 74   Resp:   20 23   Temp: 97.8 °F (36.6 °C) 97.7 °F (36.5 °C) 97.1 °F (36.2 °C) 97 °F (36.1 °C)   TempSrc: Axillary Axillary Oral Axillary   SpO2: 100%  96% 95%   Weight:       Height:           Wt Readings from Last 3 Encounters:   02/15/22 158 lb 4.6 oz (71.8 kg)   03/14/17 200 lb (90.7 kg)   02/19/16 200 lb 6.4 oz (90.9 kg)     Temp Readings from Last 3 Encounters:   02/16/22 97 °F (36.1 °C) (Axillary)   03/14/17 98.7 °F (37.1 °C) (Oral)   02/19/16 97.7 °F (36.5 °C) (Temporal)     BP Readings from Last 3 Encounters:   02/16/22 115/69   03/14/17 (!) 139/91   02/19/16 160/78     Pulse Readings from Last 3 Encounters:   02/16/22 74   03/14/17 82   02/19/16 79        Gen: awake,knows he is in hospital.  NAD, speech more clear,    HEENT: NC/AT, EOMI, blinks to threat,  PERRL, mmm, neck supple, no meningeal signs;    Heart: SR per monitor  Lungs: Respirations Unlabored  Ext: no edema,   Psych: Unable to assess  Skin: no rashes or lesions    NEUROLOGIC EXAM:    Mental Status: Alert, oriented to person and place speech is clear, no mumbling during exam today, NAD, follows simple commands intermittently    Cranial Nerve Exam:   CN II-XII: PERRL, no nystagmus, no gaze paresis, face appears symmetrical; hearing intact to conversational tone,    Motor Exam:       Moves extremities spontaneously and to command  Tone and bulk normal   No pronator drift    Deep Tendon Reflexes: 2/4 biceps, triceps, brachioradialis, patellar, and achilles b/l; flexor plantar responses b/l    Sensation: Moves extremities to touch and spontaneously     Coordination/Cerebellum:       Tremors--none      Gait and stance:      Gait: deferred      LABS:        CBC:   No results for input(s): WBC, RBC, HGB, HCT, PLT, MCV in the last 72 hours. BMP:    Recent Labs     02/16/22  0511      K 3.8      CO2 25   BUN 31*   CREATININE 1.4*   GLUCOSE 124*   CALCIUM 8.1*       IMAGING:    CTH     Impression   No acute intracranial abnormality     MRI head wo: pending    Above imaging reviewed in detail. ASSESSMENT/PLAN:    64 y.o. -male with history of T2DM, HLD, HTN, Kidney Failure presenting to [facility] on 01/28/22 after EMS was called via the [facility] department in which they were called for a well check. Patient chief complaint on presentation was poor appetite, cough and diarrhea. Neurology being consulted for altered mental status likely Covid encephalopathy superimposed on decreased cognitive reserve. Patient able to converse some. He is oriented to person and place. Motor function/sensation grossly non focal/lateralizing. Plan of care as follows:     1. AMS likely due to acute metabolic encephalopathy secondary to COVID-19 infection superimposed on decreased cognitive reserve. Continues to improve. --CT head: see above  --MRI head wo: unable to be completed as he could not tolerate. --Continue on stroke prevention with daily ASA 81 mg  --PT/OT/ST per their recommendations    Nothing further from our standpoint. We will sign off. Patient discussed with attending physician Dr. Riley Call  Thank you for allowing us to participate in the care of your patient. If there are any questions regarding evaluation please feel free to contact us.      Tom Nagel, APRN - CNS, 2/16/2022

## 2022-02-16 NOTE — PROGRESS NOTES
Nephrology Progress Note  2/15/2022 8:59 PM  Subjective: Interval History: Lakesha Flower is a 64 y.o. male weak lethargic in bed  Data:   Scheduled Meds:   sodium chloride flush  5-40 mL IntraVENous 2 times per day    sodium chloride flush  5-40 mL IntraVENous 2 times per day    insulin lispro  0-6 Units SubCUTAneous TID     insulin lispro  0-3 Units SubCUTAneous 2 times per day    [Held by provider] insulin glargine  5 Units SubCUTAneous Nightly    metoprolol tartrate  25 mg Oral BID    [Held by provider] insulin lispro  5 Units SubCUTAneous TID WC    oxyCODONE  2.5 mg Oral BID    LORazepam  0.5 mg Oral Nightly    sodium chloride flush  5-40 mL IntraVENous 2 times per day    potassium bicarb-citric acid  10 mEq Oral BID    albuterol sulfate HFA  2 puff Inhalation 4x daily    ipratropium  2 puff Inhalation 4x daily    enoxaparin  30 mg SubCUTAneous Daily    pantoprazole  40 mg IntraVENous BID    [Held by provider] gabapentin  300 mg Oral TID    [Held by provider] carBAMazepine  100 mg Oral BID    sodium chloride flush  5-40 mL IntraVENous 2 times per day    Vitamin D  2,000 Units Oral Daily    aspirin  81 mg Oral Daily    famotidine  10 mg Oral Q48H     Continuous Infusions:   sodium chloride 25 mL (02/14/22 1613)    sodium chloride      sodium chloride      dextrose           CBC   No results for input(s): WBC, HGB, HCT, PLT in the last 72 hours. BMP   Recent Labs     02/13/22  1738 02/15/22  1653    139   K 3.9 4.1    104   CO2 25 26   PHOS 2.2* 2.3*   BUN 36* 32*   CREATININE 1.4* 1.3     Hepatic:   No results for input(s): AST, ALT, ALB, BILITOT, ALKPHOS in the last 72 hours. Troponin: No results for input(s): TROPONINI in the last 72 hours. BNP: No results for input(s): BNP in the last 72 hours. Lipids: No results for input(s): CHOL, HDL in the last 72 hours.     Invalid input(s): LDLCALCU  ABGs:   Lab Results   Component Value Date    PO2ART 121 02/09/2022 QTZ2CBB 25.0 02/09/2022     INR:   No results for input(s): INR in the last 72 hours. Renal Labs  Albumin:    Lab Results   Component Value Date    LABALBU 2.7 02/07/2022    LABALBU 100 05/30/2012     Calcium:    Lab Results   Component Value Date    CALCIUM 8.4 02/15/2022     Phosphorus:    Lab Results   Component Value Date    PHOS 2.3 02/15/2022     U/A:    Lab Results   Component Value Date    NITRU NEGATIVE 02/14/2022    COLORU YELLOW 02/14/2022    WBCUA 2 02/14/2022    RBCUA 3 02/14/2022    MUCUS RARE 02/14/2022    BACTERIA RARE 02/14/2022    CLARITYU SLIGHTLY HAZY 02/14/2022    SPECGRAV 1.025 02/14/2022    UROBILINOGEN 0.2 02/14/2022    BILIRUBINUR NEGATIVE 02/14/2022    BLOODU LARGE 02/14/2022    KETUA NEGATIVE 02/14/2022     ABG:    Lab Results   Component Value Date    FYV2QQK 25.0 02/09/2022    PO2ART 121 02/09/2022    IEL5RCK 15.1 02/09/2022     HgBA1c:    Lab Results   Component Value Date    LABA1C 6.0 01/29/2022     Microalbumen/Creatinine ratio:  No components found for: RUCREAT  TSH:  No results found for: TSH  IRON:  No results found for: IRON  Iron Saturation:  No components found for: PERCENTFE  TIBC:  No results found for: TIBC  FERRITIN:    Lab Results   Component Value Date    FERRITIN 2,498 01/28/2022     RPR:  No results found for: RPR  JEREMIE:  No results found for: ANATITER, JEREMIE  24 Hour Urine for Creatinine Clearance:  No components found for: CREAT4, UHRS10, UTV10      Objective:   I/O: 02/14 0701 - 02/15 0700  In: 220 [P.O.:220]  Out: -   I/O last 3 completed shifts: In: 580 [P.O.:580]  Out: -   No intake/output data recorded. Vitals: /62   Pulse 75   Temp 98.4 °F (36.9 °C) (Axillary)   Resp 14   Ht 6' (1.829 m)   Wt 158 lb 4.6 oz (71.8 kg)   SpO2 90%   BMI 21.47 kg/m²  {  General appearance: Weak tired  HEENT: Head: Normal, normocephalic, atraumatic.   Neck: supple, symmetrical, trachea midline  Lungs: diminished breath sounds bilaterally  Heart: S1, S2 normal  Abdomen: abnormal findings:  soft nt  Extremities: edema trace  Neurologic: Mental status: alertness: Arousable        Assessment and Plan:      IMP:  #1 COVID-19 pneumonia  #2 hypernatremia  #3 acute renal failure from ATN on CKD 3 creatinine 1.6  #4 type 2 diabetes  #5 hypertension  #6 weakness with some mild confusion  #7 metabolic acidosis    Plan     #1 currently in therapy of COVID  #2 sodium stable  #3 creatinine holding 1.3 will monitor  #4 monitor glucose  #5 monitor blood pressure  #6 affect renal improving  #7 acidosis resolved replete electrolytes maintain TPN until can make a plan for nutrition  We will follow         Mani Goodman MD, MD

## 2022-02-16 NOTE — PLAN OF CARE
Nutrition Problem #1: Predicted inadequate energy intake  Intervention: Food and/or Nutrient Delivery: Continue Current Diet,Continue Oral Nutrition Supplement  Nutritional Goals: Pt will consume at least 75% of his meals and supplements

## 2022-02-17 LAB
ANION GAP SERPL CALCULATED.3IONS-SCNC: 15 MMOL/L (ref 4–16)
BUN BLDV-MCNC: 43 MG/DL (ref 6–23)
CALCIUM SERPL-MCNC: 8.5 MG/DL (ref 8.3–10.6)
CHLORIDE BLD-SCNC: 105 MMOL/L (ref 99–110)
CO2: 19 MMOL/L (ref 21–32)
CREAT SERPL-MCNC: 1.9 MG/DL (ref 0.9–1.3)
DIFFERENTIAL TYPE: ABNORMAL
EOSINOPHILS ABSOLUTE: 0.1 K/CU MM
EOSINOPHILS RELATIVE PERCENT: 2 % (ref 0–3)
GFR AFRICAN AMERICAN: 42 ML/MIN/1.73M2
GFR NON-AFRICAN AMERICAN: 34 ML/MIN/1.73M2
GLUCOSE BLD-MCNC: 116 MG/DL (ref 70–99)
GLUCOSE BLD-MCNC: 162 MG/DL (ref 70–99)
GLUCOSE BLD-MCNC: 196 MG/DL (ref 70–99)
GLUCOSE BLD-MCNC: 203 MG/DL (ref 70–99)
GLUCOSE BLD-MCNC: 223 MG/DL (ref 70–99)
GLUCOSE BLD-MCNC: 296 MG/DL (ref 70–99)
HCT VFR BLD CALC: 33.5 % (ref 42–52)
HEMOGLOBIN: 10.9 GM/DL (ref 13.5–18)
LYMPHOCYTES ABSOLUTE: 1 K/CU MM
LYMPHOCYTES RELATIVE PERCENT: 14 % (ref 24–44)
MCH RBC QN AUTO: 30.9 PG (ref 27–31)
MCHC RBC AUTO-ENTMCNC: 32.5 % (ref 32–36)
MCV RBC AUTO: 94.9 FL (ref 78–100)
MONOCYTES ABSOLUTE: 0.1 K/CU MM
MONOCYTES RELATIVE PERCENT: 2 % (ref 0–4)
MYELOCYTE PERCENT: 1 %
MYELOCYTES ABSOLUTE COUNT: 0.07 K/CU MM
PDW BLD-RTO: 15.1 % (ref 11.7–14.9)
PLATELET # BLD: 124 K/CU MM (ref 140–440)
PMV BLD AUTO: 10.6 FL (ref 7.5–11.1)
POTASSIUM SERPL-SCNC: 3.8 MMOL/L (ref 3.5–5.1)
RBC # BLD: 3.53 M/CU MM (ref 4.6–6.2)
RBC # BLD: ABNORMAL 10*6/UL
SEGMENTED NEUTROPHILS ABSOLUTE COUNT: 5.5 K/CU MM
SEGMENTED NEUTROPHILS RELATIVE PERCENT: 81 % (ref 36–66)
SODIUM BLD-SCNC: 139 MMOL/L (ref 135–145)
WBC # BLD: 6.8 K/CU MM (ref 4–10.5)

## 2022-02-17 PROCEDURE — 85007 BL SMEAR W/DIFF WBC COUNT: CPT

## 2022-02-17 PROCEDURE — 80048 BASIC METABOLIC PNL TOTAL CA: CPT

## 2022-02-17 PROCEDURE — 6370000000 HC RX 637 (ALT 250 FOR IP): Performed by: INTERNAL MEDICINE

## 2022-02-17 PROCEDURE — 6360000002 HC RX W HCPCS: Performed by: HOSPITALIST

## 2022-02-17 PROCEDURE — 94761 N-INVAS EAR/PLS OXIMETRY MLT: CPT

## 2022-02-17 PROCEDURE — 2580000003 HC RX 258: Performed by: NURSE PRACTITIONER

## 2022-02-17 PROCEDURE — 85025 COMPLETE CBC W/AUTO DIFF WBC: CPT

## 2022-02-17 PROCEDURE — 97530 THERAPEUTIC ACTIVITIES: CPT

## 2022-02-17 PROCEDURE — 6370000000 HC RX 637 (ALT 250 FOR IP): Performed by: FAMILY MEDICINE

## 2022-02-17 PROCEDURE — 83735 ASSAY OF MAGNESIUM: CPT

## 2022-02-17 PROCEDURE — 36415 COLL VENOUS BLD VENIPUNCTURE: CPT

## 2022-02-17 PROCEDURE — 84100 ASSAY OF PHOSPHORUS: CPT

## 2022-02-17 PROCEDURE — C9113 INJ PANTOPRAZOLE SODIUM, VIA: HCPCS | Performed by: HOSPITALIST

## 2022-02-17 PROCEDURE — 99231 SBSQ HOSP IP/OBS SF/LOW 25: CPT | Performed by: INTERNAL MEDICINE

## 2022-02-17 PROCEDURE — 1200000000 HC SEMI PRIVATE

## 2022-02-17 PROCEDURE — 6370000000 HC RX 637 (ALT 250 FOR IP): Performed by: HOSPITALIST

## 2022-02-17 PROCEDURE — 2580000003 HC RX 258: Performed by: INTERNAL MEDICINE

## 2022-02-17 PROCEDURE — 97535 SELF CARE MNGMENT TRAINING: CPT

## 2022-02-17 PROCEDURE — 85027 COMPLETE CBC AUTOMATED: CPT

## 2022-02-17 PROCEDURE — 82962 GLUCOSE BLOOD TEST: CPT

## 2022-02-17 RX ORDER — 0.9 % SODIUM CHLORIDE 0.9 %
500 INTRAVENOUS SOLUTION INTRAVENOUS ONCE
Status: DISCONTINUED | OUTPATIENT
Start: 2022-02-17 | End: 2022-02-19 | Stop reason: HOSPADM

## 2022-02-17 RX ADMIN — GABAPENTIN 300 MG: 300 CAPSULE ORAL at 09:24

## 2022-02-17 RX ADMIN — SODIUM CHLORIDE, PRESERVATIVE FREE 10 ML: 5 INJECTION INTRAVENOUS at 20:55

## 2022-02-17 RX ADMIN — Medication 2000 UNITS: at 09:24

## 2022-02-17 RX ADMIN — GABAPENTIN 300 MG: 300 CAPSULE ORAL at 14:54

## 2022-02-17 RX ADMIN — LORAZEPAM 0.5 MG: 0.5 TABLET ORAL at 20:55

## 2022-02-17 RX ADMIN — POTASSIUM BICARBONATE 10 MEQ: 782 TABLET, EFFERVESCENT ORAL at 09:25

## 2022-02-17 RX ADMIN — SODIUM CHLORIDE, PRESERVATIVE FREE 10 ML: 5 INJECTION INTRAVENOUS at 09:26

## 2022-02-17 RX ADMIN — POTASSIUM BICARBONATE 10 MEQ: 782 TABLET, EFFERVESCENT ORAL at 20:55

## 2022-02-17 RX ADMIN — ASPIRIN 81 MG CHEWABLE TABLET 81 MG: 81 TABLET CHEWABLE at 09:24

## 2022-02-17 RX ADMIN — ENOXAPARIN SODIUM 30 MG: 100 INJECTION SUBCUTANEOUS at 09:26

## 2022-02-17 RX ADMIN — GABAPENTIN 300 MG: 300 CAPSULE ORAL at 20:55

## 2022-02-17 RX ADMIN — PANTOPRAZOLE SODIUM 40 MG: 40 INJECTION, POWDER, FOR SOLUTION INTRAVENOUS at 20:55

## 2022-02-17 RX ADMIN — SODIUM CHLORIDE 500 ML: 9 INJECTION, SOLUTION INTRAVENOUS at 10:37

## 2022-02-17 RX ADMIN — CARBAMAZEPINE 100 MG: 100 TABLET, EXTENDED RELEASE ORAL at 09:36

## 2022-02-17 RX ADMIN — CARBAMAZEPINE 100 MG: 100 TABLET, EXTENDED RELEASE ORAL at 20:55

## 2022-02-17 RX ADMIN — PANTOPRAZOLE SODIUM 40 MG: 40 INJECTION, POWDER, FOR SOLUTION INTRAVENOUS at 09:25

## 2022-02-17 ASSESSMENT — PAIN SCALES - GENERAL: PAINLEVEL_OUTOF10: 0

## 2022-02-17 ASSESSMENT — PAIN SCALES - WONG BAKER: WONGBAKER_NUMERICALRESPONSE: 0

## 2022-02-17 NOTE — PROGRESS NOTES
Hospitalist Progress Note      Name:  Nathalie Hutchison /Age/Sex: 1945  (64 y.o. male)   MRN & CSN:  [id number] & [id number] Admission Date/Time: 2022  1:32 PM   [facility] PCP: Shai Gruber MD         Hospital Day: 21    Assessment and Plan:   Patient is 59-year-old male past medical history of chronic pain syndrome, chronic oxycodone use, diabetes, hypertension, hyperlipidemia who was admitted for confusion, generalized weakness. Patient was found to have COVID-19 pneumonia. Patient continues to require oxygen but is weaning; no improvement in mental status so Neurology now following     Acute hypoxic respiratory failure due to viral pneumonia from COVID-19  Viral pneumonia from COVID-19  Bacterial PNA with sepsis  - On admission,patient's oxygen requirement slowly worsened to 15 L but has slowly improved. The patient remained on 3 L and satting 96%. -Continue bronchodilators   -DC dexamethasone today  -Completed baricitinib  -Completed zithromax  -Seen by ID  and started on vanc and cefepime given elevated CRP/procal suspicious for bacterial infection.  -Follow blood cultures (NGTD); MRSA screen negative  - Appreciate Pulmonology and ID recs     Encephalopathy, likely metabolic  -Suspect metabolic encephalopathy and is improving daily albeit slowly; Neurology currently following and MRI pending  - Holding CNS depressant meds including Neurontin, Tegretol and Roxicodone.  - CT head 22 neg  -The patient was unable to tolerate MRI. Awaiting recommendations from neurology      Acute kidney injury likely due to ATN likely due to prerenal azotemia  Metabolic acidosis. - Nephrology following  - Awaiting AM labs     Hypernatremia   - Resolved     Hypophosphatemia/hypokalemia  - Replaced. Both normal.     Sinus tachycardia/junctional tachycardia  - Cardiology following  - Continue metoprolol,bp soft so decrease from 50 mg twice daily to 25 mg bid and monitor. .  Improved. Generalized weakness and physical deconditioning  - PT/OT following    Poor oral intake  -The patient was on PPN. PPN has been stopped as the patient is documented to be eating 75 to 100% of his meals. Strict I's and O's and nutritional support     Hyperglycemia  History of Type II DM  - Endocrinology following.  - On low sliding scale    Hypertension  -Hold blood pressure medication as the patient is slightly hypotensive today    Reason for continued hospitalization: Hypotension. Awaiting placement    Diet ADULT ORAL NUTRITION SUPPLEMENT; Breakfast, Lunch; Fortified Pudding Oral Supplement  ADULT DIET; Dysphagia - Minced and Moist; Mildly Thick (Nectar)  ADULT ORAL NUTRITION SUPPLEMENT; Lunch, Dinner, Breakfast; Frozen Oral Supplement     History of Present Illness:     Patient was lying comfortably in the bed with wife at the bedside. Initially the patient wife refused to take him to the nursing home. He is unable to take care of her finger only to decided to take him to the nursing home. The patient was noted to be hypotensive this morning. Remained asymptomatic    Objective: Intake/Output Summary (Last 24 hours) at 2/17/2022 1007  Last data filed at 2/16/2022 1142  Gross per 24 hour   Intake 240 ml   Output --   Net 240 ml      Vitals:   Vitals:    02/17/22 0922   BP:    Pulse:    Resp:    Temp: 97.8 °F (36.6 °C)   SpO2:      Physical Exam:     GEN No acute distress. HEENT moist mucous membranes, no icterus  RESP CTA B  CVS:   RRR  GI/ S/NT/ND BS+   MSK No gross joint deformities. SKIN Normal coloration, warm, dry.   NEURO no focal deficit  PSYCH awake and alert    Medications:   Medications:    sodium chloride flush  5-40 mL IntraVENous 2 times per day    sodium chloride flush  5-40 mL IntraVENous 2 times per day    insulin lispro  0-6 Units SubCUTAneous TID     insulin lispro  0-3 Units SubCUTAneous 2 times per day    [Held by provider] insulin glargine  5 Units SubCUTAneous Nightly  [Held by provider] insulin lispro  5 Units SubCUTAneous TID WC    LORazepam  0.5 mg Oral Nightly    sodium chloride flush  5-40 mL IntraVENous 2 times per day    potassium bicarb-citric acid  10 mEq Oral BID    enoxaparin  30 mg SubCUTAneous Daily    pantoprazole  40 mg IntraVENous BID    gabapentin  300 mg Oral TID    carBAMazepine  100 mg Oral BID    sodium chloride flush  5-40 mL IntraVENous 2 times per day    Vitamin D  2,000 Units Oral Daily    aspirin  81 mg Oral Daily    famotidine  10 mg Oral Q48H      Infusions:    sodium chloride 25 mL (02/14/22 1613)    sodium chloride      sodium chloride      dextrose       PRN Meds: albuterol sulfate HFA, 2 puff, Q4H PRN  ipratropium, 2 puff, Q4H PRN  sodium chloride flush, 5-40 mL, PRN  sodium chloride, 25 mL, PRN  sodium chloride flush, 5-40 mL, PRN  sodium chloride, 25 mL, PRN  sodium chloride flush, 5-40 mL, PRN  sodium chloride, 25 mL, PRN  glucose, 15 g, PRN  glucagon (rDNA), 1 mg, PRN  dextrose, 100 mL/hr, PRN  dextrose bolus (hypoglycemia), 125 mL, PRN   Or  dextrose bolus (hypoglycemia), 250 mL, PRN  sodium chloride flush, 5-40 mL, PRN  ondansetron, 4 mg, Q8H PRN   Or  ondansetron, 4 mg, Q6H PRN  polyethylene glycol, 17 g, Daily PRN  acetaminophen, 650 mg, Q6H PRN   Or  acetaminophen, 650 mg, Q6H PRN          Electronically signed by Harleen Cardona MD, MD on 2/17/2022 at 10:07 AM

## 2022-02-17 NOTE — PROGRESS NOTES
Progress Note( Dr. Kinga Ballesteros)    Subjective:   Admit Date: 1/28/2022  PCP: Landy Lau MD    Admitted For : altered mental state with generalized weakness positive for Covid pneumonia    Consulted For: Better control ol of blood glucose    Interval History:r patient mental condition bit worse  None cooperative  IV tubes are out he is off TPN and other IV fluids resume hemostat pulled them out  patient appeared to be confused and very noncooperative and very belligerent this morning    To restart IVs again    He has TPN going    Denies any chest pains,   Yes SOB . On  oxygen  Denies nausea or vomiting. Again not eating much do not see his wife around last couple of days   no new bowel or bladder symptoms. Intake/Output Summary (Last 24 hours) at 2/16/2022 2242  Last data filed at 2/16/2022 1142  Gross per 24 hour   Intake 240 ml   Output --   Net 240 ml       DATA    CBC:   No results for input(s): WBC, HGB, PLT in the last 72 hours. CMP:  Recent Labs     02/15/22  1653 02/16/22  0511    141   K 4.1 3.8    105   CO2 26 25   BUN 32* 31*   CREATININE 1.3 1.4*   CALCIUM 8.4 8.1*     Lipids:   Lab Results   Component Value Date    TRIG 118 02/16/2022     Glucose:  Recent Labs     02/16/22  1137 02/16/22  1731 02/16/22  2132   POCGLU 140* 200* 181*     [id number]:  Lab Results   Component Value Date    LABA1C 6.0 01/29/2022     High Sensitivity TSH:   Lab Results   Component Value Date    TSHHS 0.335 02/01/2022     Free T3: No results found for: FT3  Free T4:  Lab Results   Component Value Date    T4FREE 1.28 02/01/2022       CT HEAD WO CONTRAST   Final Result   No acute intracranial abnormality. RECOMMENDATIONS:   Unavailable         XR CHEST PORTABLE   Final Result   Bilateral hazy parenchymal opacities greater on the left compatible with   pneumonia. Slightly diminished inspiratory effort.          XR CHEST PORTABLE   Final Result   Left basilar infiltrates concerning for pneumonia. XR CHEST PORTABLE   Final Result   Mild bibasilar airspace disease, greater on the left, most likely pneumonia. VL DUP LOWER EXTREMITY VENOUS BILATERAL   Final Result   Left anterior tibial vein is not visualized and patient trying to get out of   bed during the exam.      No definite deep venous thrombosis is demonstrated. NM LUNG SCAN PERFUSION ONLY   Final Result   Low Probability for Pulmonary Embolus. CT HEAD WO CONTRAST   Final Result   1. No acute intracranial abnormality. 2. Diffuse parenchymal volume loss with mild-to-moderate chronic white matter   microvascular ischemic changes. 3. Chronic lacunar infarct in the right thalamus. XR CHEST PORTABLE   Final Result   Ill-defined left-greater-than-right bibasilar airspace disease most   consistent with pneumonia.          XR CHEST PORTABLE    (Results Pending)        Scheduled Medicines   Medications:    sodium chloride flush  5-40 mL IntraVENous 2 times per day    sodium chloride flush  5-40 mL IntraVENous 2 times per day    insulin lispro  0-6 Units SubCUTAneous TID WC    insulin lispro  0-3 Units SubCUTAneous 2 times per day    [Held by provider] insulin glargine  5 Units SubCUTAneous Nightly    metoprolol tartrate  25 mg Oral BID    [Held by provider] insulin lispro  5 Units SubCUTAneous TID WC    LORazepam  0.5 mg Oral Nightly    sodium chloride flush  5-40 mL IntraVENous 2 times per day    potassium bicarb-citric acid  10 mEq Oral BID    enoxaparin  30 mg SubCUTAneous Daily    pantoprazole  40 mg IntraVENous BID    gabapentin  300 mg Oral TID    carBAMazepine  100 mg Oral BID    sodium chloride flush  5-40 mL IntraVENous 2 times per day    Vitamin D  2,000 Units Oral Daily    aspirin  81 mg Oral Daily    famotidine  10 mg Oral Q48H      Infusions:    sodium chloride 25 mL (02/14/22 1613)    sodium chloride      sodium chloride      dextrose           Objective:   Vitals: /68 Pulse 87   Temp 98.3 °F (36.8 °C) (Oral)   Resp 23   Ht 6' (1.829 m)   Wt 158 lb 4.6 oz (71.8 kg)   SpO2 100%   BMI 21.47 kg/m²   General appearance: alert and not cooperative with exam feels confused  Neck: no JVD or bruit  Thyroid : Normal lobes   Lungs: Has Vesicular Breath sounds onBiPAP by  Heart:  regular rate and rhythm  Abdomen: soft, non-tender; bowel sounds normal; no masses,  no organomegaly  Musculoskeletal: Normal  Extremities: extremities normal, , no edema  Neurologic:  Awake, alert, oriented to name, place and time. Appears to be confused cranial nerves II-XII are grossly intact. Motor is  intact. Sensory is intact. ,  and gait is abnormal.  And unstable    Assessment:     Patient Active Problem List:     Acute kidney injury ([facility] [address]     Diabetes mellitus type II mild       Bilateral pneumonia possibly Covid related      Possible encephalopathy metabolic versus drug          Plan:     1. Reviewed POC blood glucose . Labs and X ray results   2. Reviewed Current Medicines   3. On meal plus correction bolus Humalog/ Basal Lantus Insulin regime  4. The patient is supposed to be on aging p.o. food  5. Also on TPN  6. Monitor Blood glucose frequently   7. Modified  the dose of Insulin/ other medicines as needed   8. Will follow     .      Davide Wright MD, MD

## 2022-02-17 NOTE — PROGRESS NOTES
Physical Therapy    Physical Therapy Treatment Note  Name: Roxane Velez MRN: [id number] :   1945   Date:  2022   Admission Date: 2022 Room:  [address] [place] [address]   Restrictions/Precautions:        droplet plus   Communication with other providers:   Patient's wide in the room and is supportive. Cotx with ALBA dunaway for patient safety  Subjective:  Patient states:  \"Can you get these wires off me\" (upon arrival)   Pain:   Location, Type, Intensity (0/10 to 10/10):  Did not report any pain. Objective:     Observation:    On Room air, 95% O2 saturation, drops to 85% with activity. Moderate fatigues that prompts seated rests breaks throughout the session  Treatment, including education/measures:  Supine to sit: Min A with hand held support, SOB increase with this transfer and patient slouches forward and rests his elbows on his knees to rest. Mod cues to sit up tall to improve breathing posture, does not correct. O2 saturation 85% but increases to 95% with increase rest  SPT: to [facility] Min A at initiation and decreases to Mod A x1 for balance plus CGA x1 for moving hips to [facility]. Another rest needed d/t SOB and fatigue increase, O2 saturation 85% and improves to 95%  Seated tolerance: ~5' on BSC, unable to have BM and urinate. Requests to return to bed  Positioning: Patient does not wish to be repositioned. Does not respond to cues to self reposition. He is side lying on Lt side in a low position in the bed. He states he's comfortable. Assessment / Impression:  Today the patient demo's increase participation today. He does not become agitated or combative today. He demo's decrease activity tolerance and struggles with pulmonary endurance. He would benefit from continued skilled rehab to increase his overall body tolerance to activity.    Patient's tolerance of treatment:  Fair   Adverse Reaction: no  Significant change in status and impact:  no  Barriers to improvement:  Activity tolerance,strength, balance, cognition, prolonged hospital stay   Plan for Next Session:    Activity tolerance, strength, balance, bed mobility, transfers as tolerated   Time in:  1510  Time out:  1544  Timed treatment minutes: 34  Total treatment time:  34    Previously filed items:  Social/Functional History  Additional Comments: Pt nonverbal and unable to answer questions on this date. Short term goals  Time Frame for Short term goals: 1 week  Short term goal 1: Pt will perform bed mobility modA w elevated HOB and use of rail  Short term goal 2: Pt will perform STS to/from EOB, chair, and toilet maxA and LRAD  Short term goal 3: Pt will perform bed<>chair transfer maxA and LRAD       Electronically signed by:     Franki Wyatt PTA  2/17/2022, 3:48 PM

## 2022-02-17 NOTE — PROGRESS NOTES
Nephrology Progress Note  2/16/2022 7:00 PM  Subjective: Interval History: Miranda Caballero is a 64 y.o. male resting in bed somewhat tired and somewhat arousable today  Data:   Scheduled Meds:   sodium chloride flush  5-40 mL IntraVENous 2 times per day    sodium chloride flush  5-40 mL IntraVENous 2 times per day    insulin lispro  0-6 Units SubCUTAneous TID WC    insulin lispro  0-3 Units SubCUTAneous 2 times per day    [Held by provider] insulin glargine  5 Units SubCUTAneous Nightly    metoprolol tartrate  25 mg Oral BID    [Held by provider] insulin lispro  5 Units SubCUTAneous TID WC    LORazepam  0.5 mg Oral Nightly    sodium chloride flush  5-40 mL IntraVENous 2 times per day    potassium bicarb-citric acid  10 mEq Oral BID    enoxaparin  30 mg SubCUTAneous Daily    pantoprazole  40 mg IntraVENous BID    gabapentin  300 mg Oral TID    carBAMazepine  100 mg Oral BID    sodium chloride flush  5-40 mL IntraVENous 2 times per day    Vitamin D  2,000 Units Oral Daily    aspirin  81 mg Oral Daily    famotidine  10 mg Oral Q48H     Continuous Infusions:   sodium chloride 25 mL (02/14/22 1613)    sodium chloride      sodium chloride      dextrose           CBC   No results for input(s): WBC, HGB, HCT, PLT in the last 72 hours. BMP   Recent Labs     02/15/22  1653 02/16/22  0511    141   K 4.1 3.8    105   CO2 26 25   PHOS 2.3* 2.3*   BUN 32* 31*   CREATININE 1.3 1.4*     Hepatic:   No results for input(s): AST, ALT, ALB, BILITOT, ALKPHOS in the last 72 hours. Troponin: No results for input(s): TROPONINI in the last 72 hours. BNP: No results for input(s): BNP in the last 72 hours. Lipids: No results for input(s): CHOL, HDL in the last 72 hours. Invalid input(s): LDLCALCU  ABGs:   Lab Results   Component Value Date    PO2ART 121 02/09/2022    WKB8EJS 25.0 02/09/2022     INR:   No results for input(s): INR in the last 72 hours.   Renal Labs  Albumin:    Lab Results Component Value Date    LABALBU 2.7 02/07/2022    LABALBU 100 05/30/2012     Calcium:    Lab Results   Component Value Date    CALCIUM 8.1 02/16/2022     Phosphorus:    Lab Results   Component Value Date    PHOS 2.3 02/16/2022     U/A:    Lab Results   Component Value Date    NITRU NEGATIVE 02/14/2022    COLORU YELLOW 02/14/2022    WBCUA 2 02/14/2022    RBCUA 3 02/14/2022    MUCUS RARE 02/14/2022    BACTERIA RARE 02/14/2022    CLARITYU SLIGHTLY HAZY 02/14/2022    SPECGRAV 1.025 02/14/2022    UROBILINOGEN 0.2 02/14/2022    BILIRUBINUR NEGATIVE 02/14/2022    BLOODU LARGE 02/14/2022    KETUA NEGATIVE 02/14/2022     ABG:    Lab Results   Component Value Date    TKI5MOO 25.0 02/09/2022    PO2ART 121 02/09/2022    SBV4IAM 15.1 02/09/2022     HgBA1c:    Lab Results   Component Value Date    LABA1C 6.0 01/29/2022     Microalbumen/Creatinine ratio:  No components found for: RUCREAT  TSH:  No results found for: TSH  IRON:  No results found for: IRON  Iron Saturation:  No components found for: PERCENTFE  TIBC:  No results found for: TIBC  FERRITIN:    Lab Results   Component Value Date    FERRITIN 2,498 01/28/2022     RPR:  No results found for: RPR  JEREMIE:  No results found for: ANATITER, JEREMIE  24 Hour Urine for Creatinine Clearance:  No components found for: CREAT4, UHRS10, UTV10      Objective:   I/O: 02/15 0701 - 02/16 0700  In: 360 [P.O.:360]  Out: -   I/O last 3 completed shifts: In: 360 [P.O.:360]  Out: -   I/O this shift:  In: 240 [P.O.:240]  Out: -   Vitals: /69   Pulse 74   Temp 97 °F (36.1 °C) (Axillary)   Resp 23   Ht 6' (1.829 m)   Wt 158 lb 4.6 oz (71.8 kg)   SpO2 95%   BMI 21.47 kg/m²  {  General appearance: Weak tired  HEENT: Head: Normal, normocephalic, atraumatic.   Neck: supple, symmetrical, trachea midline  Lungs: diminished breath sounds bilaterally  Heart: S1, S2 normal  Abdomen: abnormal findings:  soft nt  Extremities: edema trace  Neurologic: Mental status: alertness: Arousable        Assessment and Plan:      IMP:  #1 COVID-19 pneumonia  #2 hypernatremia  #3 acute renal failure from ATN on CKD 3 creatinine 1.6  #4 type 2 diabetes  #5 hypertension  #6 weakness with some mild confusion  #7 metabolic acidosis    Plan     #1 treatment above setting for COVID #2 sodium holding stable  #3 monitor renal function at 1.4  #4 monitor glucose  #5 blood pressure stable #6 monitor affect somewhat better    #7 treat acidosis         Laverne Uriarte MD, MD

## 2022-02-17 NOTE — PROGRESS NOTES
Occupational Therapy  . Occupational Therapy Treatment Note      Name: Craig Chiu MRN: [id number] :   1945   Date:  2022   Admission Date: 2022 Room:  [facility]     Primary Problem:      Restrictions/Precautions:    General precautions, fall risk    Droplet plus    Communication with other providers:  cotx with PTA Claire Ambrosio for safety and assist and participation. Subjective:  Patient states:  Its not right ot take me from my home. Pain: none stated (location, type, intensity)    Objective:    Observation:  patient supine. family present. Patient more pleasant this date and willing to work. Objective Measures:  BP- 117/54 , RA- 86-88% after transferring to [facility] and supine to sit. Treatment, including education:    ADL activity training was instructed today. Cues were given for safety, sequence, UE/LE placement, visual cues, and balance. Activities performed today included dressing, toileting, hand hygiene, and grooming. LB dressing- Min-Mod A to  Don/doff pants mainly for threading BLE, and cues. Patient needing rest breaks and increased time and effort while EOB. patient became SOB requiring cues for PLB. 86% on RA.   toileting- patient transferred to [facility] and sat on [facility] with no success. Would anticpate needing some assistance for nivia care. LB dressing- DEP for don socks    Therapeutic activity training was instructed today. Cues were given for safety, sequence, UE/LE placement, awareness, and balance. Activities performed today included bed mobility training, sup-sit, sit-stand, SPT. Supine to EOB- Min A with increased time and effort.   eob sitting balance good but hunched over d/t to SOB. Cues for erect posture but patient would not adhere to cues. SPT to [facility] to/from- Max A for safety plus CGA for additional support. Would not listen to cues for safety and sequencing. Return to side lying- Min A.      Patient educated on role of OT , benefits of OT and rationale for therapeutic intervention. All therapeutic intervention performed c emphasis on dynamic balance / standing tolerance to increase strength, endurance and activity tolerance for increased Sharp c ADL tasks and func transfers / mobility. Patient left safely in bed at end of session, with call light in reach, alarm on and nursing aware.        Assessment / Impression:    Patient's tolerance of treatment: fair  Adverse Reaction: none  Significant change in status and impact:  none  Barriers to improvement: weakness,       Plan for Next Session:    Continue with OT POC      Time in:  1512  Time out:  1545  Timed treatment minutes:  33  Total treatment time:  33      Electronically signed by:    KELLY Evans COTA/L [id number]    2/17/2022, 5:36 PM

## 2022-02-17 NOTE — PROGRESS NOTES
Nephrology Progress Note  2/17/2022 5:01 PM  Subjective: Interval History: Maurice Sifuentes is a 64 y.o. male weak and restless in bed off of the IVs at this time and wife is wondering why he does not want pain meds  Data:   Scheduled Meds:   sodium chloride  500 mL IntraVENous Once    sodium chloride flush  5-40 mL IntraVENous 2 times per day    sodium chloride flush  5-40 mL IntraVENous 2 times per day    insulin lispro  0-6 Units SubCUTAneous TID WC    insulin lispro  0-3 Units SubCUTAneous 2 times per day    [Held by provider] insulin glargine  5 Units SubCUTAneous Nightly    [Held by provider] insulin lispro  5 Units SubCUTAneous TID WC    LORazepam  0.5 mg Oral Nightly    sodium chloride flush  5-40 mL IntraVENous 2 times per day    potassium bicarb-citric acid  10 mEq Oral BID    enoxaparin  30 mg SubCUTAneous Daily    pantoprazole  40 mg IntraVENous BID    gabapentin  300 mg Oral TID    carBAMazepine  100 mg Oral BID    sodium chloride flush  5-40 mL IntraVENous 2 times per day    Vitamin D  2,000 Units Oral Daily    aspirin  81 mg Oral Daily    famotidine  10 mg Oral Q48H     Continuous Infusions:   sodium chloride 25 mL (02/14/22 1613)    sodium chloride      sodium chloride      dextrose           CBC   Recent Labs     02/17/22  1318   WBC 6.8   HGB 10.9*   HCT 33.5*   *      BMP   Recent Labs     02/15/22  1653 02/16/22  0511 02/17/22  1318    141 139   K 4.1 3.8 3.8    105 105   CO2 26 25 19*   PHOS 2.3* 2.3*  --    BUN 32* 31* 43*   CREATININE 1.3 1.4* 1.9*     Hepatic:   No results for input(s): AST, ALT, ALB, BILITOT, ALKPHOS in the last 72 hours. Troponin: No results for input(s): TROPONINI in the last 72 hours. BNP: No results for input(s): BNP in the last 72 hours. Lipids: No results for input(s): CHOL, HDL in the last 72 hours.     Invalid input(s): LDLCALCU  ABGs:   Lab Results   Component Value Date    PO2ART 121 02/09/2022    RLY6PAV 25.0 02/09/2022     INR:   No results for input(s): INR in the last 72 hours. Renal Labs  Albumin:    Lab Results   Component Value Date    LABALBU 2.7 02/07/2022    LABALBU 100 05/30/2012     Calcium:    Lab Results   Component Value Date    CALCIUM 8.5 02/17/2022     Phosphorus:    Lab Results   Component Value Date    PHOS 2.3 02/16/2022     U/A:    Lab Results   Component Value Date    NITRU NEGATIVE 02/14/2022    COLORU YELLOW 02/14/2022    WBCUA 2 02/14/2022    RBCUA 3 02/14/2022    MUCUS RARE 02/14/2022    BACTERIA RARE 02/14/2022    CLARITYU SLIGHTLY HAZY 02/14/2022    SPECGRAV 1.025 02/14/2022    UROBILINOGEN 0.2 02/14/2022    BILIRUBINUR NEGATIVE 02/14/2022    BLOODU LARGE 02/14/2022    KETUA NEGATIVE 02/14/2022     ABG:    Lab Results   Component Value Date    UKN9WFN 25.0 02/09/2022    PO2ART 121 02/09/2022    XFL1LWO 15.1 02/09/2022     HgBA1c:    Lab Results   Component Value Date    LABA1C 6.0 01/29/2022     Microalbumen/Creatinine ratio:  No components found for: RUCREAT  TSH:  No results found for: TSH  IRON:  No results found for: IRON  Iron Saturation:  No components found for: PERCENTFE  TIBC:  No results found for: TIBC  FERRITIN:    Lab Results   Component Value Date    FERRITIN 2,498 01/28/2022     RPR:  No results found for: RPR  JEREMIE:  No results found for: ANATITER, JEREMIE  24 Hour Urine for Creatinine Clearance:  No components found for: CREAT4, UHRS10, UTV10      Objective:   I/O: 02/16 0701 - 02/17 0700  In: 240 [P.O.:240]  Out: -   I/O last 3 completed shifts: In: 240 [P.O.:240]  Out: -   No intake/output data recorded. Vitals: BP (!) 85/46   Pulse 84   Temp 97.8 °F (36.6 °C) (Oral)   Resp 27   Ht 6' (1.829 m)   Wt 161 lb 9.6 oz (73.3 kg)   SpO2 98%   BMI 21.92 kg/m²  {  General appearance: Weak tired  HEENT: Head: Normal, normocephalic, atraumatic.   Neck: supple, symmetrical, trachea midline  Lungs: diminished breath sounds bilaterally  Heart: S1, S2 normal  Abdomen: abnormal findings:  soft nt  Extremities: edema trace  Neurologic: Mental status: alertness: Arousable        Assessment and Plan:      IMP:  #1 COVID-19 pneumonia  #2 hypernatremia  #3 acute renal failure from ATN on CKD 3 creatinine 1.6  #4 type 2 diabetes  #5 hypertension  #6 weakness with some mild confusion  #7 metabolic acidosis    Plan     #1 was treated for COVID  #2 patient again has removed IV as needed taking TPN appears to be getting dehydrated again patient has worsening renal function with acidosis.   He did work on explaining the wife that without better and adequate volume management but not improved from renal standpoint  #3 monitor glucose  #4 blood pressure holding low try to hydrate some more  #5 affect slowly declining again  #6 monitor acidosis  Supportive care alone is not enough and recommend feeding tube for to be aggressive in care         Gray Connor MD, MD

## 2022-02-17 NOTE — CARE COORDINATION
LSW spoke with pt's wife regarding discharge plans for SNF. Wife is agreeable for pt to go to [facility]. Wife asked if she can stay with pt 24 hrs. LSw inforemd her that this LSW did not think she can stay with pt at all times in the SNF. They can not be responsible for her careand needs. LSW explained she will probably be able to visit pt however this LSW did nto think she can stay al day and night with pt. Wife is concerned that tp sharifa not be feed. Wife stated pt will not eat unless he is feed. LSW informed wife that this LSW told the admission director of the need of pt being feed. LSW will follow up with Alana from [facility] today and requested updated PT/OT notes.

## 2022-02-17 NOTE — PROGRESS NOTES
pulmonary      SUBJECTIVE: no sob     OBJECTIVE    VITALS:  BP (!) 98/58   Pulse 65   Temp 97.8 °F (36.6 °C) (Oral)   Resp 21   Ht 6' (1.829 m)   Wt 161 lb 9.6 oz (73.3 kg)   SpO2 98%   BMI 21.92 kg/m²   HEAD AND FACE EXAM:  No throat injection, no active exudate,no thrush  NECK EXAM;No JVD, no masses, symmetrical  CHEST EXAM; Expansion equal and symmetrical, no masses  LUNG EXAM; Good breath sounds bilaterally. There are expiratory wheezes both lungs, there are crackles at both lung bases  CARDIOVASCULAR EXAM: Positive S1 and S2, no S3 or S4, no clicks ,no murmurs  RIGHT AND LEFT LOWER EXTRIMITY EXAM: No edema, no swelling, no inflamation            LABS   Lab Results   Component Value Date    WBC 12.1 (H) 02/11/2022    HGB 11.6 (L) 02/11/2022    HCT 36.6 (L) 02/11/2022    MCV 97.9 02/11/2022    PLT 97 (L) 02/11/2022     Lab Results   Component Value Date    CREATININE 1.4 (H) 02/16/2022    BUN 31 (H) 02/16/2022     02/16/2022    K 3.8 02/16/2022     02/16/2022    CO2 25 02/16/2022     Lab Results   Component Value Date    INR 5.10 (HH) 02/01/2022    PROTIME 66.9 (H) 02/01/2022          Lab Results   Component Value Date    PHOS 2.3 02/16/2022    PHOS 2.3 02/15/2022    PHOS 2.2 02/13/2022      No results for input(s): PH, PO2ART, YBQ1KVM, HCO3, BEART, O2SAT in the last 72 hours.       Wt Readings from Last 3 Encounters:   02/17/22 161 lb 9.6 oz (73.3 kg)   03/14/17 200 lb (90.7 kg)   02/19/16 200 lb 6.4 oz (90.9 kg)               ASSESMENT  Ac resp failure improved  covid pneumonia  Bact pneumonia        PLAN  1. cpm  2. o2 as needed  3. placement    2/17/2022  Samia Bowers MD, MSUMAN.

## 2022-02-17 NOTE — PROGRESS NOTES
Infectious Disease Progress Note  2022   Patient Name: Patricia Romano : 1945       Reason for visit: F/u COVID-19 pneumonia, acute respiratory failure? History:? Interval history noted  Awake, signifies no complaint  Physical Exam:  Vital Signs: BP (!) 98/58   Pulse 65   Temp 97.8 °F (36.6 °C) (Oral)   Resp 21   Ht 6' (1.829 m)   Wt 161 lb 9.6 oz (73.3 kg)   SpO2 98%   BMI 21.92 kg/m²     Gen: Awake, not answering questions but respond to commands  Skin: no stigmata of endocarditis  Wounds: C/D/I  HEMT: AT/NC Oropharynx pink, moist, and without lesions or exudates  Eyes: PERRLA, EOMI, conjunctiva pink, sclera anicteric. Neck: Supple. Trachea midline. No LAD. Chest: Reduced air entry bilaterally  Heart: RRR  Abd: soft, non-distended, no tenderness, no hepatomegaly. Normoactive bowel sounds. Ext: no clubbing, cyanosis, or edema  Catheter Site: without erythema or tenderness  LDA:   Neuro: Mental status intact. Radiologic / Imaging / TESTING  No results found.      Labs:    Recent Results (from the past 24 hour(s))   POCT Glucose    Collection Time: 22  5:31 PM   Result Value Ref Range    POC Glucose 200 (H) 70 - 99 MG/DL   POCT Glucose    Collection Time: 22  9:32 PM   Result Value Ref Range    POC Glucose 181 (H) 70 - 99 MG/DL   POCT Glucose    Collection Time: 22  3:26 AM   Result Value Ref Range    POC Glucose 162 (H) 70 - 99 MG/DL   POCT Glucose    Collection Time: 22  8:01 AM   Result Value Ref Range    POC Glucose 116 (H) 70 - 99 MG/DL   POCT Glucose    Collection Time: 22 11:45 AM   Result Value Ref Range    POC Glucose 203 (H) 70 - 99 MG/DL     CULTURE results: Invalid input(s): BLOOD CULTURE,  URINE CULTURE, SURGICAL CULTURE    Diagnosis:  Patient Active Problem List   Diagnosis    Acute kidney injury ([address]    COVID    Acute respiratory failure with hypoxia ([facility]    Toxic metabolic encephalopathy       Active Problems  Principal Problem:    Acute kidney injury ([facility] [address]  Active Problems:    COVID    Acute respiratory failure with hypoxia (HCC)    Toxic metabolic encephalopathy  Resolved Problems:    * No resolved hospital problems. *      Impression and plan   Summary and rationale: Patient is a 64 y.o.  male with a medical history of diabetes mellitus, hypertension, hyperlipidemia, not vaccinated against COVID-19 who was admitted on 01/20/2022 for 2-week history of cough, poor appetite and diarrhea. Diagnosed with severe COVID-19 pneumonia and acute hypoxic respiratory failure. Consulted on 02/09/2022 for worsening status. Elevated procalcitonin and CRP suggestive of cysts secondary bacterial pneumonia. Seen by neurology service, diagnosed with metabolic encephalopathy on reduced cognitive reserve.  MRSA nares negative   COVID-19 symptom onset: 01/21/2022   COVID-19 test date: 01/28/2022   Expected discontinuation of isolation precautions: 02/10/2022   Clinical status: improving, off oxygen.  Therapeutic:  o Ongoing antibiotics:completed cefepime (2/9-15), end date 2/15/2022  o Immunomodulators:  - Dexamethasone: Yes  - Baricitinib: Yes  o Completed antibiotics: Azithromycin, Discontinue vancomycin 2/9-12  o Other agents:    Diagnostic:    F/u:    Other: Will sign off and not follow the patient actively, please reconsult as needed.          Electronically signed by: Electronically signed by Charis Sepulveda MD on 2/17/2022 at 1:55 PM

## 2022-02-18 VITALS
OXYGEN SATURATION: 92 % | WEIGHT: 161.82 LBS | BODY MASS INDEX: 21.92 KG/M2 | TEMPERATURE: 99.5 F | HEART RATE: 114 BPM | RESPIRATION RATE: 29 BRPM | HEIGHT: 72 IN | SYSTOLIC BLOOD PRESSURE: 146 MMHG | DIASTOLIC BLOOD PRESSURE: 73 MMHG

## 2022-02-18 LAB
ANION GAP SERPL CALCULATED.3IONS-SCNC: 12 MMOL/L (ref 4–16)
BUN BLDV-MCNC: 45 MG/DL (ref 6–23)
CALCIUM SERPL-MCNC: 8.2 MG/DL (ref 8.3–10.6)
CHLORIDE BLD-SCNC: 109 MMOL/L (ref 99–110)
CO2: 22 MMOL/L (ref 21–32)
CREAT SERPL-MCNC: 2 MG/DL (ref 0.9–1.3)
GFR AFRICAN AMERICAN: 39 ML/MIN/1.73M2
GFR NON-AFRICAN AMERICAN: 32 ML/MIN/1.73M2
GLUCOSE BLD-MCNC: 136 MG/DL (ref 70–99)
GLUCOSE BLD-MCNC: 154 MG/DL (ref 70–99)
GLUCOSE BLD-MCNC: 233 MG/DL (ref 70–99)
GLUCOSE BLD-MCNC: 257 MG/DL (ref 70–99)
GLUCOSE BLD-MCNC: 290 MG/DL (ref 70–99)
GLUCOSE BLD-MCNC: 307 MG/DL (ref 70–99)
POTASSIUM SERPL-SCNC: 3.8 MMOL/L (ref 3.5–5.1)
SODIUM BLD-SCNC: 143 MMOL/L (ref 135–145)

## 2022-02-18 PROCEDURE — 2580000003 HC RX 258: Performed by: FAMILY MEDICINE

## 2022-02-18 PROCEDURE — 1200000000 HC SEMI PRIVATE

## 2022-02-18 PROCEDURE — 36415 COLL VENOUS BLD VENIPUNCTURE: CPT

## 2022-02-18 PROCEDURE — 6370000000 HC RX 637 (ALT 250 FOR IP): Performed by: FAMILY MEDICINE

## 2022-02-18 PROCEDURE — 82962 GLUCOSE BLOOD TEST: CPT

## 2022-02-18 PROCEDURE — 2580000003 HC RX 258: Performed by: NURSE PRACTITIONER

## 2022-02-18 PROCEDURE — 2580000003 HC RX 258: Performed by: INTERNAL MEDICINE

## 2022-02-18 PROCEDURE — C9113 INJ PANTOPRAZOLE SODIUM, VIA: HCPCS | Performed by: HOSPITALIST

## 2022-02-18 PROCEDURE — 6360000002 HC RX W HCPCS: Performed by: HOSPITALIST

## 2022-02-18 PROCEDURE — 94761 N-INVAS EAR/PLS OXIMETRY MLT: CPT

## 2022-02-18 PROCEDURE — 6370000000 HC RX 637 (ALT 250 FOR IP): Performed by: INTERNAL MEDICINE

## 2022-02-18 PROCEDURE — 80048 BASIC METABOLIC PNL TOTAL CA: CPT

## 2022-02-18 PROCEDURE — 6370000000 HC RX 637 (ALT 250 FOR IP): Performed by: HOSPITALIST

## 2022-02-18 RX ORDER — ASPIRIN 81 MG/1
81 TABLET, CHEWABLE ORAL DAILY
Qty: 30 TABLET | Refills: 3
Start: 2022-02-19

## 2022-02-18 RX ADMIN — INSULIN GLARGINE 5 UNITS: 100 INJECTION, SOLUTION SUBCUTANEOUS at 21:47

## 2022-02-18 RX ADMIN — SODIUM CHLORIDE, PRESERVATIVE FREE 10 ML: 5 INJECTION INTRAVENOUS at 21:10

## 2022-02-18 RX ADMIN — CARBAMAZEPINE 100 MG: 100 TABLET, EXTENDED RELEASE ORAL at 10:52

## 2022-02-18 RX ADMIN — GABAPENTIN 300 MG: 300 CAPSULE ORAL at 20:58

## 2022-02-18 RX ADMIN — LORAZEPAM 0.5 MG: 0.5 TABLET ORAL at 20:58

## 2022-02-18 RX ADMIN — ASPIRIN 81 MG CHEWABLE TABLET 81 MG: 81 TABLET CHEWABLE at 10:25

## 2022-02-18 RX ADMIN — Medication 2000 UNITS: at 10:25

## 2022-02-18 RX ADMIN — PANTOPRAZOLE SODIUM 40 MG: 40 INJECTION, POWDER, FOR SOLUTION INTRAVENOUS at 10:25

## 2022-02-18 RX ADMIN — SODIUM CHLORIDE, PRESERVATIVE FREE 10 ML: 5 INJECTION INTRAVENOUS at 21:54

## 2022-02-18 RX ADMIN — SODIUM CHLORIDE, PRESERVATIVE FREE 10 ML: 5 INJECTION INTRAVENOUS at 10:26

## 2022-02-18 RX ADMIN — ENOXAPARIN SODIUM 30 MG: 100 INJECTION SUBCUTANEOUS at 10:26

## 2022-02-18 RX ADMIN — PANTOPRAZOLE SODIUM 40 MG: 40 INJECTION, POWDER, FOR SOLUTION INTRAVENOUS at 20:58

## 2022-02-18 RX ADMIN — CARBAMAZEPINE 100 MG: 100 TABLET, EXTENDED RELEASE ORAL at 21:08

## 2022-02-18 RX ADMIN — FAMOTIDINE 10 MG: 20 TABLET, FILM COATED ORAL at 10:25

## 2022-02-18 RX ADMIN — POTASSIUM BICARBONATE 10 MEQ: 782 TABLET, EFFERVESCENT ORAL at 20:58

## 2022-02-18 RX ADMIN — GABAPENTIN 300 MG: 300 CAPSULE ORAL at 10:51

## 2022-02-18 RX ADMIN — SODIUM CHLORIDE, PRESERVATIVE FREE 10 ML: 5 INJECTION INTRAVENOUS at 21:55

## 2022-02-18 RX ADMIN — POTASSIUM BICARBONATE 10 MEQ: 782 TABLET, EFFERVESCENT ORAL at 10:25

## 2022-02-18 RX ADMIN — ACETAMINOPHEN 650 MG: 325 TABLET ORAL at 21:17

## 2022-02-18 ASSESSMENT — PAIN SCALES - GENERAL
PAINLEVEL_OUTOF10: 0
PAINLEVEL_OUTOF10: 2
PAINLEVEL_OUTOF10: 0
PAINLEVEL_OUTOF10: 2
PAINLEVEL_OUTOF10: 0

## 2022-02-18 NOTE — DISCHARGE INSTR - COC
Continuity of Care Form    Patient Name: Roxane Velez   :  1945  MRN:  [id number]    Admit date:  2022  Discharge date: 2022    Code Status Order: Full Code   Advance Directives:      Admitting Physician:  Ester Ridley DO  PCP: Cynthia Díaz MD    Discharging Nurse: Jc Jones RN  [address] Unit/Room#: 3510/9621-H  Discharging Unit Phone Number: [phone]    Emergency Contact:   Extended Emergency Contact Information  Primary Emergency Contact: Alecia Abbott  Address: [address] [place] [address] Phone: [phone]  Relation: Spouse    Past Surgical History:  Past Surgical History:   Procedure Laterality Date    CERVICAL SPINE SURGERY      3 years ago    ENDOSCOPY, COLON, DIAGNOSTIC  2016    mild inflammation at the squamocolumnar junction- biopsied, no hiatal hernia, diffuse gastric erythema- biopsies to rule out chronic gastritis or intestinal metaplasia       Immunization History: There is no immunization history on file for this patient.     Active Problems:  Patient Active Problem List   Diagnosis Code    Acute kidney injury ([facility] [address] N17.9    COVID U07.1    Acute respiratory failure with hypoxia (HCC) T65.33    Toxic metabolic encephalopathy T10.7       Isolation/Infection:   Isolation            Droplet Plus          Patient Infection Status       Infection Onset Added Last Indicated Last Indicated By Review Planned Expiration Resolved Resolved By    COVID-19 22 COVID-19, Rapid 22      Resolved    COVID-19 (Rule Out) 22 COVID-19, Rapid (Ordered)   22 Rule-Out Test Resulted            Nurse Assessment:  Last Vital Signs: /64   Pulse 102   Temp 97.4 °F (36.3 °C) (Tympanic)   Resp 27   Ht 6' (1.829 m)   Wt 161 lb 13.1 oz (73.4 kg)   SpO2 97%   BMI 21.95 kg/m²     Last documented pain score (0-10 scale): Pain Level: 0  Last Weight: Wt Readings from Last 1 Encounters:   02/18/22 161 lb 13.1 oz (73.4 kg)     Mental Status:  alert    IV Access:  - None    Nursing Mobility/ADLs:  Walking   Dependent  Transfer  Dependent  Bathing  Dependent  Dressing  Dependent  Toileting  Dependent  Feeding  Dependent  Med Admin  Dependent  Med Delivery   crushed    Wound Care Documentation and Therapy:        Elimination:  Continence: Bowel: No  Bladder: No  Urinary Catheter: None   Colostomy/Ileostomy/Ileal Conduit: No       Date of Last BM:     No intake or output data in the 24 hours ending 02/18/22 1535  No intake/output data recorded. Safety Concerns:     History of Falls (last 30 days), At Risk for Falls, and Aspiration Risk    Impairments/Disabilities:      None      Patient's personal belongings (please select all that are sent with patient):  None    RN SIGNATURE:  Electronically signed by Chapo Nichols RN on 2/18/22 at 4:32 PM EST      PHYSICIAN SECTION    Nutrition Therapy:  Current Nutrition Therapy:   - Oral Diet:  General    Routes of Feeding: Oral  Liquids: No Restrictions  Daily Fluid Restriction: no  Last Modified Barium Swallow with Video (Video Swallowing Test): not done    Treatments at the Time of Hospital Discharge:   Respiratory Treatments: As needed  Oxygen Therapy:  is not on home oxygen therapy.   Ventilator:    - No ventilator support    Rehab Therapies: Physical Therapy, Occupational Therapy, and Speech/Language Therapy  Weight Bearing Status/Restrictions: No weight bearing restirctions  Other Medical Equipment (for information only, NOT a DME order): None  Other Treatments: None    Prognosis: Fair    Condition at Discharge: Stable    Rehab Potential (if transferring to Rehab): Good    Recommended Labs or Other Treatments After Discharge: CBC and RFP weekly    Physician Certification: I certify the above information and transfer of Ketan Stockton  is necessary for the continuing treatment of the diagnosis listed and that he requires Sergio Brendan for greater 30 days.      Update Admission H&P: No change in H&P    PHYSICIAN SIGNATURE:  Electronically signed by Jennifer Garcia MD, MD on 2/18/22 at 4:20 PM EST

## 2022-02-18 NOTE — PROGRESS NOTES
pulmonary      SUBJECTIVE:  No sob     OBJECTIVE    VITALS:  BP (!) 111/50   Pulse 83   Temp 97.6 °F (36.4 °C) (Oral)   Resp 22   Ht 6' (1.829 m)   Wt 161 lb 13.1 oz (73.4 kg)   SpO2 93%   BMI 21.95 kg/m²   HEAD AND FACE EXAM:  No throat injection, no active exudate,no thrush  NECK EXAM;No JVD, no masses, symmetrical  CHEST EXAM; Expansion equal and symmetrical, no masses  LUNG EXAM; Good breath sounds bilaterally. There are expiratory wheezes both lungs, there are crackles at both lung bases  CARDIOVASCULAR EXAM: Positive S1 and S2, no S3 or S4, no clicks ,no murmurs  RIGHT AND LEFT LOWER EXTRIMITY EXAM: No edema, no swelling, no inflamation            LABS   Lab Results   Component Value Date    WBC 6.8 02/17/2022    HGB 10.9 (L) 02/17/2022    HCT 33.5 (L) 02/17/2022    MCV 94.9 02/17/2022     (L) 02/17/2022     Lab Results   Component Value Date    CREATININE 1.9 (H) 02/17/2022    BUN 43 (H) 02/17/2022     02/17/2022    K 3.8 02/17/2022     02/17/2022    CO2 19 (L) 02/17/2022     Lab Results   Component Value Date    INR 5.10 (HH) 02/01/2022    PROTIME 66.9 (H) 02/01/2022          Lab Results   Component Value Date    PHOS 2.3 02/16/2022    PHOS 2.3 02/15/2022    PHOS 2.2 02/13/2022      No results for input(s): PH, PO2ART, DVJ4KHP, HCO3, BEART, O2SAT in the last 72 hours.       Wt Readings from Last 3 Encounters:   02/18/22 161 lb 13.1 oz (73.4 kg)   03/14/17 200 lb (90.7 kg)   02/19/16 200 lb 6.4 oz (90.9 kg)               ASSESMENT  covid pneumonia  Bact pneumonia        PLAN  1. cpm  2. o2 as needed    2/18/2022  Kira Hernandez MD, M.D.

## 2022-02-18 NOTE — CARE COORDINATION
Pt has been approved to go to [facility]. Doctor is aware. CM will need JAVIER completed by doctor and RN. If pt is discharged after hours, please contact the following, call report to [phone] and fax completed AVS with both JAVIER's and any written Rx's to [phone]. Set up transportation, pts choice. [facility]- [phone], [facility] [phone] or [facility] [phone].

## 2022-02-18 NOTE — PROGRESS NOTES
Hospitalist Progress Note      Name:  Dora Cruz DOB/Age/Sex: 1945  (64 y.o. male)   MRN & CSN:  [id number] & [id number] Admission Date/Time: 2022  1:32 PM   [facility]:  [facility] PCP: Nicole Contreras MD         Hospital Day:     Assessment and Plan:   Patient is 68-year-old male past medical history of chronic pain syndrome, chronic oxycodone use, diabetes, hypertension, hyperlipidemia who was admitted for confusion, generalized weakness. Patient was found to have COVID-19 pneumonia. Patient continues to require oxygen but is weaning; no improvement in mental status so Neurology now following     Acute hypoxic respiratory failure due to viral pneumonia from COVID-19  Viral pneumonia from COVID-19  Bacterial PNA with sepsis  -Completed therapies with dexamethasone and baricitinib azithromycin. Remains on room air. Encephalopathy, likely metabolic  -Much improved. Back to baseline according the patient's wife     Acute kidney injury likely due to ATN likely due to prerenal azotemia  Metabolic acidosis.  -Labs continues to improve     Hypernatremia   - Resolved     Hypophosphatemia/hypokalemia  - Replaced. Both normal.     Sinus tachycardia/junctional tachycardia  - Cardiology following  - Continue metoprolol,bp soft so decrease from 50 mg twice daily to 25 mg bid and monitor. .  Improved. Generalized weakness and physical deconditioning  - PT/OT following    Poor oral intake  -The patient was on PPN. PPN has been stopped as the patient is documented to be eating 75 to 100% of his meals. Strict I's and O's and nutritional support     Hyperglycemia  History of Type II DM  - Endocrinology following.  - On low sliding scale    Hypertension  -Hold blood pressure medication as the patient is slightly hypotensive today    Reason for continued hospitalization: Patient remained medically stable. Awaiting placement    Diet ADULT ORAL NUTRITION SUPPLEMENT; Breakfast, Lunch;  Fortified Pudding Oral Supplement  ADULT DIET; Dysphagia - Minced and Moist; Mildly Thick (Nectar)  ADULT ORAL NUTRITION SUPPLEMENT; Lunch, Dinner, Breakfast; Frozen Oral Supplement     History of Present Illness:     Lying comfortably in the bed with the wife at the bedside. The patient ate 100% of his breakfast.    Objective:     No intake or output data in the 24 hours ending 02/18/22 1033   Vitals:   Vitals:    02/18/22 1013   BP: (!) 102/58   Pulse: 99   Resp: 29   Temp: 97.8 °F (36.6 °C)   SpO2: 96%     Physical Exam:     GEN No acute distress. HEENT moist mucous membranes, no icterus  RESP CTA B  CVS:   Tachycardic  GI/ S/NT/ND BS+   MSK No gross joint deformities. SKIN Normal coloration, warm, dry.   NEURO no focal deficit  PSYCH awake alert and oriented    Medications:   Medications:    sodium chloride  500 mL IntraVENous Once    sodium chloride flush  5-40 mL IntraVENous 2 times per day    sodium chloride flush  5-40 mL IntraVENous 2 times per day    insulin lispro  0-6 Units SubCUTAneous TID WC    insulin lispro  0-3 Units SubCUTAneous 2 times per day    insulin glargine  5 Units SubCUTAneous Nightly    [Held by provider] insulin lispro  5 Units SubCUTAneous TID WC    LORazepam  0.5 mg Oral Nightly    sodium chloride flush  5-40 mL IntraVENous 2 times per day    potassium bicarb-citric acid  10 mEq Oral BID    enoxaparin  30 mg SubCUTAneous Daily    pantoprazole  40 mg IntraVENous BID    gabapentin  300 mg Oral TID    carBAMazepine  100 mg Oral BID    sodium chloride flush  5-40 mL IntraVENous 2 times per day    Vitamin D  2,000 Units Oral Daily    aspirin  81 mg Oral Daily    famotidine  10 mg Oral Q48H      Infusions:    sodium chloride 25 mL (02/14/22 1613)    sodium chloride      sodium chloride      dextrose       PRN Meds: albuterol sulfate HFA, 2 puff, Q4H PRN  ipratropium, 2 puff, Q4H PRN  sodium chloride flush, 5-40 mL, PRN  sodium chloride, 25 mL, PRN  sodium chloride flush, 5-40 mL, PRN  sodium chloride, 25 mL, PRN  sodium chloride flush, 5-40 mL, PRN  sodium chloride, 25 mL, PRN  glucose, 15 g, PRN  glucagon (rDNA), 1 mg, PRN  dextrose, 100 mL/hr, PRN  dextrose bolus (hypoglycemia), 125 mL, PRN   Or  dextrose bolus (hypoglycemia), 250 mL, PRN  sodium chloride flush, 5-40 mL, PRN  ondansetron, 4 mg, Q8H PRN   Or  ondansetron, 4 mg, Q6H PRN  polyethylene glycol, 17 g, Daily PRN  acetaminophen, 650 mg, Q6H PRN   Or  acetaminophen, 650 mg, Q6H PRN          Electronically signed by Tasha Flores MD, MD on 2/18/2022 at 10:33 AM

## 2022-02-18 NOTE — CARE COORDINATION
JAKE spoke to Alana at [facility] and she said they were able to accept pt today as long as PT/OT notes were in. Packet started and PASS complete.

## 2022-02-18 NOTE — DISCHARGE SUMMARY
Discharge Summary  Fred Silva MD, MD     Name:  Genny Mauro /Age/Sex: 1945  (64 y.o. male)   MRN & CSN:  [id number] & [id number] Admission Date/Time: 2022  1:32 PM   Attending:  Fred Silva MD Discharging Physician: Fred Silva MD, MD     Hospital Course:     Patient was 77-year-old male past medical history of chronic pain syndrome, chronic oxycodone use, diabetes, hypertension, hyperlipidemia who was admitted for confusion, generalized weakness.  Patient was found to have COVID-19 pneumonia. Singh Rai continues to require oxygen but is weaning; no improvement in mental status so Neurology now following     Acute hypoxic respiratory failure due to viral pneumonia from COVID-19  Viral pneumonia from COVID-19  Bacterial PNA with sepsis  -Completed therapies with dexamethasone and baricitinib azithromycin. Remains on room air.     Encephalopathy, likely metabolic  -Much improved. Back to baseline according the patient's wife     Acute kidney injury likely due to ATN likely due to prerenal azotemia  Metabolic acidosis.  -Labs continues to improve     Hypernatremia   - Resolved     Hypophosphatemia/hypokalemia  - Replaced. Both normal.     Sinus tachycardia/junctional tachycardia  -Resolved     Generalized weakness and physical deconditioning  -PT OT follow the patient throughout hospital course and upon discharge will discharge to skilled nursing facility     Poor oral intake  -The patient was on PPN. PPN has been stopped as the patient is documented to be eating 75 to 100% of his meals. The biggest problem is that he will need someone to feed him.   Apparently the nursing home has agreed for his wife to come to feed him for breakfast lunch and dinner     Hyperglycemia  History of Type II DM  - Endocrinology following.  - On low sliding scale     Hypertension  -Hold blood pressure medication as the patient is slightly hypotensive today    The patient expressed appropriate understanding of and agreement with the discharge recommendations, medications, and plan. Consults this admission:  IP CONSULT TO NEPHROLOGY  IP CONSULT TO HOSPITALIST  IP CONSULT TO PULMONOLOGY  IP CONSULT TO PHARMACY  IP CONSULT TO IV TEAM  IP CONSULT TO CASE MANAGEMENT  IP CONSULT TO DIETITIAN  IP CONSULT TO INFECTIOUS DISEASES  PHARMACY TO DOSE VANCOMYCIN  IP CONSULT TO NEUROLOGY  IP CONSULT TO IV TEAM  IP CONSULT TO IV TEAM  IP CONSULT TO IV TEAM  IP CONSULT TO NEUROLOGY    Discharge Instruction:     Follow up appointments:     [facility]  [address] [id number] [phone]            Primary care physician:  within 2 weeks    Diet:  regular diet     Activity: activity as tolerated    Disposition: Discharged to:   []Home, []HHC, [x]SNF, []Acute Rehab, []Hospice     Condition on discharge: Stable    Discharge Medications:        Medication List      ASK your doctor about these medications    atorvastatin 80 MG tablet  Commonly known as: LIPITOR     carBAMazepine 100 MG extended release tablet  Commonly known as: TEGRETOL XR     gabapentin 400 MG capsule  Commonly known as: NEURONTIN     glipiZIDE 2.5 MG extended release tablet  Commonly known as: GLUCOTROL XL     lisinopril 10 MG tablet  Commonly known as: PRINIVIL;ZESTRIL     * metFORMIN 500 MG tablet  Commonly known as: GLUCOPHAGE     * metFORMIN 500 MG tablet  Commonly known as: GLUCOPHAGE     oxyCODONE 20 MG extended release tablet  Commonly known as: OXYCONTIN         * This list has 2 medication(s) that are the same as other medications prescribed for you. Read the directions carefully, and ask your doctor or other care provider to review them with you.                  Objective Findings at Discharge:     /64   Pulse 102   Temp 97.4 °F (36.3 °C) (Tympanic)   Resp 27   Ht 6' (1.829 m)   Wt 161 lb 13.1 oz (73.4 kg)   SpO2 97%   BMI 21.95 kg/m²            PHYSICAL EXAM     Seen and examined on the day of the discharge    LABS: Reviewd    IMAGING: Reviwed    39 Minutes spent in preparation of discharging this patient including face to face encounter, discussions with the patient/family, medication reconciliation, and transition of care preparation.      Electronically signed by Geraldine Costa MD, MD on 2/18/2022 at 4:17 PM

## 2022-02-18 NOTE — PROGRESS NOTES
Progress Note( Dr. Kelly Son)    Subjective:   Admit Date: 1/28/2022  PCP: Antonio Arredondo MD    Admitted For : altered mental state with generalized weakness positive for Covid pneumonia    Consulted For: Better control ol of blood glucose    Interval History:r patient mental condition bit better    pt is more sober today  Talked some sense . His wife was with him today   He is eating kareen   All IV running including TPN     Restarted  IVs again    He has TPN going    Denies any chest pains,   Yes SOB . On  oxygen  Denies nausea or vomiting. Again not eating much do not see his wife around last couple of days   no new bowel or bladder symptoms. No intake or output data in the 24 hours ending 02/17/22 2247    DATA    CBC:   Recent Labs     02/17/22  1318   WBC 6.8   HGB 10.9*   *    CMP:  Recent Labs     02/15/22  1653 02/16/22  0511 02/17/22  1318    141 139   K 4.1 3.8 3.8    105 105   CO2 26 25 19*   BUN 32* 31* 43*   CREATININE 1.3 1.4* 1.9*   CALCIUM 8.4 8.1* 8.5     Lipids:   Lab Results   Component Value Date    TRIG 118 02/16/2022     Glucose:  Recent Labs     02/17/22  1145 02/17/22  1600 02/17/22 2036   POCGLU 203* 196* 296*     OlbwkelxmhG4O:  Lab Results   Component Value Date    LABA1C 6.0 01/29/2022     High Sensitivity TSH:   Lab Results   Component Value Date    TSHHS 0.335 02/01/2022     Free T3: No results found for: FT3  Free T4:  Lab Results   Component Value Date    T4FREE 1.28 02/01/2022       CT HEAD WO CONTRAST   Final Result   No acute intracranial abnormality. RECOMMENDATIONS:   Unavailable         XR CHEST PORTABLE   Final Result   Bilateral hazy parenchymal opacities greater on the left compatible with   pneumonia. Slightly diminished inspiratory effort. XR CHEST PORTABLE   Final Result   Left basilar infiltrates concerning for pneumonia.          XR CHEST PORTABLE   Final Result   Mild bibasilar airspace disease, greater on the left, most likely pneumonia. VL DUP LOWER EXTREMITY VENOUS BILATERAL   Final Result   Left anterior tibial vein is not visualized and patient trying to get out of   bed during the exam.      No definite deep venous thrombosis is demonstrated. NM LUNG SCAN PERFUSION ONLY   Final Result   Low Probability for Pulmonary Embolus. CT HEAD WO CONTRAST   Final Result   1. No acute intracranial abnormality. 2. Diffuse parenchymal volume loss with mild-to-moderate chronic white matter   microvascular ischemic changes. 3. Chronic lacunar infarct in the right thalamus. XR CHEST PORTABLE   Final Result   Ill-defined left-greater-than-right bibasilar airspace disease most   consistent with pneumonia.          XR CHEST PORTABLE    (Results Pending)        Scheduled Medicines   Medications:    sodium chloride  500 mL IntraVENous Once    sodium chloride flush  5-40 mL IntraVENous 2 times per day    sodium chloride flush  5-40 mL IntraVENous 2 times per day    insulin lispro  0-6 Units SubCUTAneous TID WC    insulin lispro  0-3 Units SubCUTAneous 2 times per day    [Held by provider] insulin glargine  5 Units SubCUTAneous Nightly    [Held by provider] insulin lispro  5 Units SubCUTAneous TID WC    LORazepam  0.5 mg Oral Nightly    sodium chloride flush  5-40 mL IntraVENous 2 times per day    potassium bicarb-citric acid  10 mEq Oral BID    enoxaparin  30 mg SubCUTAneous Daily    pantoprazole  40 mg IntraVENous BID    gabapentin  300 mg Oral TID    carBAMazepine  100 mg Oral BID    sodium chloride flush  5-40 mL IntraVENous 2 times per day    Vitamin D  2,000 Units Oral Daily    aspirin  81 mg Oral Daily    famotidine  10 mg Oral Q48H      Infusions:    sodium chloride 25 mL (02/14/22 1613)    sodium chloride      sodium chloride      dextrose           Objective:   Vitals: BP (!) 96/58   Pulse 108   Temp 98.6 °F (37 °C) (Oral)   Resp 23   Ht 6' (1.829 m)   Wt 161 lb 9.6 oz (73.3 kg) SpO2 94%   BMI 21.92 kg/m²   General appearance: alert and not cooperative with exam feels confused  Neck: no JVD or bruit  Thyroid : Normal lobes   Lungs: Has Vesicular Breath sounds onBiPAP by  Heart:  regular rate and rhythm  Abdomen: soft, non-tender; bowel sounds normal; no masses,  no organomegaly  Musculoskeletal: Normal  Extremities: extremities normal, , no edema  Neurologic:  Awake, alert, oriented to name, place and time. Appears to be confused cranial nerves II-XII are grossly intact. Motor is  intact. Sensory is intact. ,  and gait is abnormal.  And unstable    Assessment:     Patient Active Problem List:     Acute kidney injury ([facility] [address]     Diabetes mellitus type II mild       Bilateral pneumonia possibly Covid related      Possible encephalopathy metabolic versus drug          Plan:     1. Reviewed POC blood glucose . Labs and X ray results   2. Reviewed Current Medicines   3. On meal plus correction bolus Humalog/ Basal Lantus Insulin regime  4. The patient is supposed to be on eatinmg  p.o. food  5. Also on TPN  6. Monitor Blood glucose frequently   7. Modified  the dose of Insulin/ other medicines as needed   8. Will follow     .      Refugio Montanez MD, MD

## 2022-02-18 NOTE — PROGRESS NOTES
Nephrology Progress Note  2/18/2022 3:09 PM  Subjective: Interval History: Ann Lindsey is a 64 y.o. male more awake with wife and eating p.o. intake  Data:   Scheduled Meds:   sodium chloride  500 mL IntraVENous Once    sodium chloride flush  5-40 mL IntraVENous 2 times per day    sodium chloride flush  5-40 mL IntraVENous 2 times per day    insulin lispro  0-6 Units SubCUTAneous TID WC    insulin lispro  0-3 Units SubCUTAneous 2 times per day    insulin glargine  5 Units SubCUTAneous Nightly    [Held by provider] insulin lispro  5 Units SubCUTAneous TID WC    LORazepam  0.5 mg Oral Nightly    sodium chloride flush  5-40 mL IntraVENous 2 times per day    potassium bicarb-citric acid  10 mEq Oral BID    enoxaparin  30 mg SubCUTAneous Daily    pantoprazole  40 mg IntraVENous BID    gabapentin  300 mg Oral TID    carBAMazepine  100 mg Oral BID    sodium chloride flush  5-40 mL IntraVENous 2 times per day    Vitamin D  2,000 Units Oral Daily    aspirin  81 mg Oral Daily    famotidine  10 mg Oral Q48H     Continuous Infusions:   sodium chloride 25 mL (02/14/22 1613)    sodium chloride      sodium chloride      dextrose           CBC   Recent Labs     02/17/22  1318   WBC 6.8   HGB 10.9*   HCT 33.5*   *      BMP   Recent Labs     02/15/22  1653 02/15/22  1653 02/16/22  0511 02/17/22  1318 02/18/22  1311      < > 141 139 143   K 4.1   < > 3.8 3.8 3.8      < > 105 105 109   CO2 26   < > 25 19* 22   PHOS 2.3*  --  2.3*  --   --    BUN 32*   < > 31* 43* 45*   CREATININE 1.3   < > 1.4* 1.9* 2.0*    < > = values in this interval not displayed. Hepatic:   No results for input(s): AST, ALT, ALB, BILITOT, ALKPHOS in the last 72 hours. Troponin: No results for input(s): TROPONINI in the last 72 hours. BNP: No results for input(s): BNP in the last 72 hours. Lipids: No results for input(s): CHOL, HDL in the last 72 hours.     Invalid input(s): LDLCALCU  ABGs:   Lab Results Component Value Date    PO2ART 121 02/09/2022    VEE0RUT 25.0 02/09/2022     INR:   No results for input(s): INR in the last 72 hours. Renal Labs  Albumin:    Lab Results   Component Value Date    LABALBU 2.7 02/07/2022    LABALBU 100 05/30/2012     Calcium:    Lab Results   Component Value Date    CALCIUM 8.2 02/18/2022     Phosphorus:    Lab Results   Component Value Date    PHOS 2.3 02/16/2022     U/A:    Lab Results   Component Value Date    NITRU NEGATIVE 02/14/2022    COLORU YELLOW 02/14/2022    WBCUA 2 02/14/2022    RBCUA 3 02/14/2022    MUCUS RARE 02/14/2022    BACTERIA RARE 02/14/2022    CLARITYU SLIGHTLY HAZY 02/14/2022    SPECGRAV 1.025 02/14/2022    UROBILINOGEN 0.2 02/14/2022    BILIRUBINUR NEGATIVE 02/14/2022    BLOODU LARGE 02/14/2022    KETUA NEGATIVE 02/14/2022     ABG:    Lab Results   Component Value Date    ANL8ZCZ 25.0 02/09/2022    PO2ART 121 02/09/2022    MLV1CWR 15.1 02/09/2022     HgBA1c:    Lab Results   Component Value Date    LABA1C 6.0 01/29/2022     Microalbumen/Creatinine ratio:  No components found for: RUCREAT  TSH:  No results found for: TSH  IRON:  No results found for: IRON  Iron Saturation:  No components found for: PERCENTFE  TIBC:  No results found for: TIBC  FERRITIN:    Lab Results   Component Value Date    FERRITIN 2,498 01/28/2022     RPR:  No results found for: RPR  JEREMIE:  No results found for: ANATITER, JEREMIE  24 Hour Urine for Creatinine Clearance:  No components found for: CREAT4, UHRS10, UTV10      Objective:   I/O: No intake/output data recorded. No intake/output data recorded. No intake/output data recorded. Vitals: /64   Pulse 102   Temp 97.4 °F (36.3 °C) (Tympanic)   Resp 27   Ht 6' (1.829 m)   Wt 161 lb 13.1 oz (73.4 kg)   SpO2 97%   BMI 21.95 kg/m²  {  General appearance: Weak tired  HEENT: Head: Normal, normocephalic, atraumatic.   Neck: supple, symmetrical, trachea midline  Lungs: diminished breath sounds bilaterally  Heart: S1, S2 normal  Abdomen: abnormal findings:  soft nt  Extremities: edema trace  Neurologic: Mental status: alertness: Arousable        Assessment and Plan:      IMP:  #1 COVID-19 pneumonia  #2 hypernatremia  #3 acute renal failure from ATN on CKD 3 creatinine 1.6  #4 type 2 diabetes  #5 hypertension  #6 weakness with some mild confusion  #7 metabolic acidosis    Plan     #1 maintain therapy in the setting of COVID  #2 sodium holding stable  #3 other creatinine elevated most likely related to volume this will be a chronic issue working on hydration  #4 blood pressure stable  #5 monitor affect better with wife  #6 mild acidosis  #7 work on discharge to nursing home approved for Fredrik Brittle, MD, MD

## 2022-02-19 NOTE — FLOWSHEET NOTE
2300: Pt left unit as per below vs.  HR elevated and NP Maximo aware. Tylenol given for temperature. All PIV's removed. Pt accompanied by Amita Jimenez to [facility]. Report given earlier as per morning RN. Charge Nurse updated accordingly.      02/18/22 1152   Vital Signs   Temp 99.5 °F (37.5 °C)   Temp Source Axillary   Pulse 114   Heart Rate Source Monitor   Resp 29   BP (!) 146/73   BP Location Right upper arm   MAP (mmHg) 95   Patient Position Supine   Level of Consciousness Responds to Voice (1)   MEWS Score 4   Patient Currently in Pain No   Pain Assessment   Pain Assessment 0-10   Pain Level 0   Oxygen Therapy   SpO2 92 %   Pulse Oximeter Device Mode Intermittent   O2 Device None (Room air)

## 2022-02-19 NOTE — PROGRESS NOTES
Progress Note(  Romy Son)    Subjective:   Admit Date: 1/28/2022  PCP: Adele Castorena MD    Admitted For : altered mental state with generalized weakness positive for Covid pneumonia    Consulted For: Better control ol of blood glucose    Interval History:r patient mental condition bit better    pt is more sober today  Talked some sense . His wife was with him today   He is eating better only when fed by his wife  Discontinued TPN     Restarted  IVs again    He has TPN going    Denies any chest pains,   Yes SOB . On  oxygen  Denies nausea or vomiting. Again not eating much do not see his wife around last couple of days   no new bowel or bladder symptoms. No intake or output data in the 24 hours ending 02/18/22 1939    DATA    CBC:   Recent Labs     02/17/22  1318   WBC 6.8   HGB 10.9*   *    CMP:  Recent Labs     02/16/22  0511 02/17/22  1318 02/18/22  1311    139 143   K 3.8 3.8 3.8    105 109   CO2 25 19* 22   BUN 31* 43* 45*   CREATININE 1.4* 1.9* 2.0*   CALCIUM 8.1* 8.5 8.2*     Lipids:   Lab Results   Component Value Date    TRIG 118 02/16/2022     Glucose:  Recent Labs     02/18/22  0635 02/18/22  1058 02/18/22  1617   POCGLU 136* 257* 307*     LzenyxkjgfX4A:  Lab Results   Component Value Date    LABA1C 6.0 01/29/2022     High Sensitivity TSH:   Lab Results   Component Value Date    TSHHS 0.335 02/01/2022     Free T3: No results found for: FT3  Free T4:  Lab Results   Component Value Date    T4FREE 1.28 02/01/2022       CT HEAD WO CONTRAST   Final Result   No acute intracranial abnormality. RECOMMENDATIONS:   Unavailable         XR CHEST PORTABLE   Final Result   Bilateral hazy parenchymal opacities greater on the left compatible with   pneumonia. Slightly diminished inspiratory effort. XR CHEST PORTABLE   Final Result   Left basilar infiltrates concerning for pneumonia.          XR CHEST PORTABLE   Final Result   Mild bibasilar airspace disease, greater on the left, most likely pneumonia. VL DUP LOWER EXTREMITY VENOUS BILATERAL   Final Result   Left anterior tibial vein is not visualized and patient trying to get out of   bed during the exam.      No definite deep venous thrombosis is demonstrated. NM LUNG SCAN PERFUSION ONLY   Final Result   Low Probability for Pulmonary Embolus. CT HEAD WO CONTRAST   Final Result   1. No acute intracranial abnormality. 2. Diffuse parenchymal volume loss with mild-to-moderate chronic white matter   microvascular ischemic changes. 3. Chronic lacunar infarct in the right thalamus. XR CHEST PORTABLE   Final Result   Ill-defined left-greater-than-right bibasilar airspace disease most   consistent with pneumonia.          XR CHEST PORTABLE    (Results Pending)        Scheduled Medicines   Medications:    sodium chloride  500 mL IntraVENous Once    sodium chloride flush  5-40 mL IntraVENous 2 times per day    sodium chloride flush  5-40 mL IntraVENous 2 times per day    insulin lispro  0-6 Units SubCUTAneous TID WC    insulin lispro  0-3 Units SubCUTAneous 2 times per day    insulin glargine  5 Units SubCUTAneous Nightly    [Held by provider] insulin lispro  5 Units SubCUTAneous TID WC    LORazepam  0.5 mg Oral Nightly    sodium chloride flush  5-40 mL IntraVENous 2 times per day    potassium bicarb-citric acid  10 mEq Oral BID    enoxaparin  30 mg SubCUTAneous Daily    pantoprazole  40 mg IntraVENous BID    gabapentin  300 mg Oral TID    carBAMazepine  100 mg Oral BID    sodium chloride flush  5-40 mL IntraVENous 2 times per day    Vitamin D  2,000 Units Oral Daily    aspirin  81 mg Oral Daily    famotidine  10 mg Oral Q48H      Infusions:    sodium chloride 25 mL (02/14/22 1613)    sodium chloride      sodium chloride      dextrose           Objective:   Vitals: /64   Pulse 102   Temp 97.4 °F (36.3 °C) (Tympanic)   Resp 27   Ht 6' (1.829 m)   Wt 161 lb 13.1 oz (73.4 kg) SpO2 97%   BMI 21.95 kg/m²   General appearance: alert and not cooperative with exam feels confused  Neck: no JVD or bruit  Thyroid : Normal lobes   Lungs: Has Vesicular Breath sounds onBiPAP by  Heart:  regular rate and rhythm  Abdomen: soft, non-tender; bowel sounds normal; no masses,  no organomegaly  Musculoskeletal: Normal  Extremities: extremities normal, , no edema  Neurologic:  Awake, alert, oriented to name, place and time. Appears to be confused cranial nerves II-XII are grossly intact. Motor is  intact. Sensory is intact. ,  and gait is abnormal.  And unstable    Assessment:     Patient Active Problem List:     Acute kidney injury ([facility] [address]     Diabetes mellitus type II mild       Bilateral pneumonia possibly Covid related      Possible encephalopathy metabolic versus drug          Plan:     1. Reviewed POC blood glucose . Labs and X ray results   2. Reviewed Current Medicines   3. On meal plus correction bolus Humalog/ Basal Lantus Insulin regime  4. The patient is supposed to be on eatinmg  p.o. food  5. Continued TPN  6. Monitor Blood glucose frequently   7. Modified  the dose of Insulin/ other medicines as needed   8. Plan is to discharge patient to [facility] when insurance approves   9. Will follow     .      Bertha Anna MD, MD

## 2022-02-21 ENCOUNTER — HOSPITAL ENCOUNTER (OUTPATIENT)
Age: 77
Setting detail: SPECIMEN
Discharge: HOME | End: 2022-02-21

## 2022-02-21 PROBLEM — E55.9 VITAMIN D DEFICIENCY: Status: ACTIVE | Noted: 2022-02-21

## 2022-02-21 LAB
ALBUMIN SERPL-MCNC: 2.6 GM/DL (ref 3.4–5)
ALP BLD-CCNC: 107 IU/L (ref 40–128)
ALT SERPL-CCNC: 62 U/L (ref 10–40)
ANION GAP SERPL CALCULATED.3IONS-SCNC: 13 MMOL/L (ref 4–16)
AST SERPL-CCNC: 27 IU/L (ref 15–37)
BASOPHILS ABSOLUTE: 0 K/CU MM
BASOPHILS RELATIVE PERCENT: 0.2 % (ref 0–1)
BILIRUB SERPL-MCNC: 0.5 MG/DL (ref 0–1)
BUN BLDV-MCNC: 33 MG/DL (ref 6–23)
CALCIUM SERPL-MCNC: 8.5 MG/DL (ref 8.3–10.6)
CHLORIDE BLD-SCNC: 117 MMOL/L (ref 99–110)
CO2: 20 MMOL/L (ref 21–32)
CREAT SERPL-MCNC: 1.7 MG/DL (ref 0.9–1.3)
DIFFERENTIAL TYPE: ABNORMAL
EOSINOPHILS ABSOLUTE: 0.1 K/CU MM
EOSINOPHILS RELATIVE PERCENT: 1.7 % (ref 0–3)
GFR AFRICAN AMERICAN: 47 ML/MIN/1.73M2
GFR NON-AFRICAN AMERICAN: 39 ML/MIN/1.73M2
GLUCOSE BLD-MCNC: 130 MG/DL (ref 70–99)
HCT VFR BLD CALC: 29.2 % (ref 42–52)
HEMOGLOBIN: 9.2 GM/DL (ref 13.5–18)
IMMATURE NEUTROPHIL %: 1.5 % (ref 0–0.43)
LYMPHOCYTES ABSOLUTE: 1.1 K/CU MM
LYMPHOCYTES RELATIVE PERCENT: 19.9 % (ref 24–44)
MCH RBC QN AUTO: 31.1 PG (ref 27–31)
MCHC RBC AUTO-ENTMCNC: 31.5 % (ref 32–36)
MCV RBC AUTO: 98.6 FL (ref 78–100)
MONOCYTES ABSOLUTE: 0.4 K/CU MM
MONOCYTES RELATIVE PERCENT: 7 % (ref 0–4)
NUCLEATED RBC %: 0 %
PDW BLD-RTO: 15.9 % (ref 11.7–14.9)
PLATELET # BLD: 125 K/CU MM (ref 140–440)
PMV BLD AUTO: 10.1 FL (ref 7.5–11.1)
POTASSIUM SERPL-SCNC: 3.8 MMOL/L (ref 3.5–5.1)
RBC # BLD: 2.96 M/CU MM (ref 4.6–6.2)
SEGMENTED NEUTROPHILS ABSOLUTE COUNT: 3.8 K/CU MM
SEGMENTED NEUTROPHILS RELATIVE PERCENT: 69.7 % (ref 36–66)
SODIUM BLD-SCNC: 150 MMOL/L (ref 135–145)
TOTAL IMMATURE NEUTOROPHIL: 0.08 K/CU MM
TOTAL NUCLEATED RBC: 0 K/CU MM
TOTAL PROTEIN: 5.5 GM/DL (ref 6.4–8.2)
WBC # BLD: 5.4 K/CU MM (ref 4–10.5)

## 2022-02-21 PROCEDURE — 80053 COMPREHEN METABOLIC PANEL: CPT

## 2022-02-21 PROCEDURE — 85025 COMPLETE CBC W/AUTO DIFF WBC: CPT

## 2022-02-21 PROCEDURE — 36415 COLL VENOUS BLD VENIPUNCTURE: CPT

## 2022-02-21 RX ORDER — ERGOCALCIFEROL 1.25 MG/1
50000 CAPSULE ORAL WEEKLY
Qty: 12 CAPSULE | Refills: 0 | Status: SHIPPED | OUTPATIENT
Start: 2022-02-21

## 2022-02-22 ENCOUNTER — HOSPITAL ENCOUNTER (OUTPATIENT)
Age: 77
Setting detail: SPECIMEN
Discharge: HOME | End: 2022-02-22

## 2022-02-22 LAB
ALBUMIN SERPL-MCNC: 2.7 GM/DL (ref 3.4–5)
ALP BLD-CCNC: 123 IU/L (ref 40–128)
ALT SERPL-CCNC: 107 U/L (ref 10–40)
ANION GAP SERPL CALCULATED.3IONS-SCNC: 15 MMOL/L (ref 4–16)
AST SERPL-CCNC: 64 IU/L (ref 15–37)
BILIRUB SERPL-MCNC: 0.6 MG/DL (ref 0–1)
BUN BLDV-MCNC: 36 MG/DL (ref 6–23)
CALCIUM SERPL-MCNC: 8.5 MG/DL (ref 8.3–10.6)
CHLORIDE BLD-SCNC: 113 MMOL/L (ref 99–110)
CO2: 19 MMOL/L (ref 21–32)
CREAT SERPL-MCNC: 1.7 MG/DL (ref 0.9–1.3)
GFR AFRICAN AMERICAN: 47 ML/MIN/1.73M2
GFR NON-AFRICAN AMERICAN: 39 ML/MIN/1.73M2
GLUCOSE BLD-MCNC: 125 MG/DL (ref 70–99)
HCT VFR BLD CALC: 30.7 % (ref 42–52)
HEMOGLOBIN: 9.7 GM/DL (ref 13.5–18)
MCH RBC QN AUTO: 31 PG (ref 27–31)
MCHC RBC AUTO-ENTMCNC: 31.6 % (ref 32–36)
MCV RBC AUTO: 98.1 FL (ref 78–100)
PDW BLD-RTO: 16 % (ref 11.7–14.9)
PLATELET # BLD: 125 K/CU MM (ref 140–440)
PMV BLD AUTO: 9.8 FL (ref 7.5–11.1)
POTASSIUM SERPL-SCNC: 4 MMOL/L (ref 3.5–5.1)
RBC # BLD: 3.13 M/CU MM (ref 4.6–6.2)
SODIUM BLD-SCNC: 147 MMOL/L (ref 135–145)
TOTAL PROTEIN: 5.8 GM/DL (ref 6.4–8.2)
WBC # BLD: 6 K/CU MM (ref 4–10.5)

## 2022-02-22 PROCEDURE — 80053 COMPREHEN METABOLIC PANEL: CPT

## 2022-02-22 PROCEDURE — 36415 COLL VENOUS BLD VENIPUNCTURE: CPT

## 2022-02-22 PROCEDURE — 85027 COMPLETE CBC AUTOMATED: CPT

## 2023-12-19 NOTE — PROGRESS NOTES
Anesthesiologist present for case.  See Anesthesia Record for details regarding sedation/anesthesia, medications, and vital signs. 114 (H) 99 - 110 mMol/L    CO2 9 (L) 21 - 32 MMOL/L    Anion Gap 15 4 - 16    BUN 54 (H) 6 - 23 MG/DL    CREATININE 1.7 (H) 0.9 - 1.3 MG/DL    Glucose 110 (H) 70 - 99 MG/DL    Calcium 7.8 (L) 8.3 - 10.6 MG/DL    GFR Non- 39 (L) >60 mL/min/1.73m2    GFR  47 (L) >60 mL/min/1.73m2   Vancomycin, random    Collection Time: 02/10/22  8:27 AM   Result Value Ref Range    Vancomycin Rm 10.4 UG/ML    DOSE AMOUNT DOSE AMT. GIVEN - NONE     DOSE TIME DOSE TIME GIVEN - NONE    POCT Glucose    Collection Time: 02/10/22  8:42 AM   Result Value Ref Range    POC Glucose 136 (H) 70 - 99 MG/DL   POCT Glucose    Collection Time: 02/10/22 11:41 AM   Result Value Ref Range    POC Glucose 157 (H) 70 - 99 MG/DL   C-Reactive Protein    Collection Time: 02/10/22 12:13 PM   Result Value Ref Range    CRP, High Sensitivity 9.5 mg/L   Magnesium    Collection Time: 02/10/22 12:13 PM   Result Value Ref Range    Magnesium 1.8 1.8 - 2.4 mg/dl   Phosphorus    Collection Time: 02/10/22 12:13 PM   Result Value Ref Range    Phosphorus 1.6 (L) 2.5 - 4.9 MG/DL     CULTURE results: Invalid input(s): BLOOD CULTURE,  URINE CULTURE, SURGICAL CULTURE    Diagnosis:  Patient Active Problem List   Diagnosis    Acute kidney injury ([facility] [address]    Pneumonia due to COVID-19 virus    Acute respiratory failure with hypoxia ([facility] [address]       Active Problems  Principal Problem:    Acute kidney injury ([facility] [address]  Active Problems:    Pneumonia due to COVID-19 virus    Acute respiratory failure with hypoxia ([facility] [address]  Resolved Problems:    * No resolved hospital problems. *      Impression and plan   Summary and rationale: Patient is a 64 y.o.  male with a medical history of diabetes mellitus, hypertension, hyperlipidemia, not vaccinated against COVID-19 who was admitted on 01/20/2022 for 2-week history of cough, poor appetite and diarrhea. Diagnosed with severe COVID-19 pneumonia and acute hypoxic respiratory failure.   Consulted on 02/09/2022 for worsening status. Elevated procalcitonin and CRP suggestive of cysts secondary bacterial pneumonia.  COVID-19 symptom onset: 01/21/2022   COVID-19 test date: 01/28/2022   Expected discontinuation of isolation precautions: 02/10/2022   Clinical status: Some improvement in oxygen needs, however remains confused.    Therapeutic:  o Ongoing antibiotics: Vancomycin and cefepime (2/9-)  o Immunomodulators:  - Dexamethasone: Yes  - Baricitinib: Yes  o Completed antibiotics: Azithromycin  o Other agents:    Diagnostic: Trend CRP and procalcitonin   F/u: MRSA nares   Other:      Electronically signed by: Electronically signed by Jenn Acharya MD on 2/10/2022 at 2:42 PM

## 2024-06-17 NOTE — PROGRESS NOTES
Call [phone] or [phone] with any questions or concerns.   Physician Progress Note      Fabian Duong  CSN #:                  [id number]  :                       1945  ADMIT DATE:       2022 1:32 PM  [address] [place] DATE:  RESPONDING  PROVIDER #:        James Pack MD          QUERY TEXT:    Pt admitted with Covid-19 pneumonia, MARCO. Pt noted to have confusion on   admission, Per H&P, \"A&O x2.\" If possible, please document in the progress   notes and discharge summary if you are evaluating and / or treating any of the   following: The medical record reflects the following:  Risk Factors: Covid-19, pneumonia, MARCO, hypernatremia  Clinical Indicators: Per nursing assessment, \"impulsive, poor judgement, poor   safey awareness. \"  Treatment: bed alarm, fall precautions    CARRIE De La GarzaN, RN  Clinical   [phone]  Options provided:  -- Metabolic encephalopathy  -- Other - I will add my own diagnosis  -- Disagree - Not applicable / Not valid  -- Disagree - Clinically unable to determine / Unknown  -- Refer to Clinical Documentation Reviewer    PROVIDER RESPONSE TEXT:    This patient has metabolic encephalopathy.     Query created by: Keyshawn Staton on 2022 7:45 AM      Electronically signed by:  James Pack MD 2022 9:57 AM